# Patient Record
Sex: MALE | Race: WHITE | Employment: FULL TIME | URBAN - METROPOLITAN AREA
[De-identification: names, ages, dates, MRNs, and addresses within clinical notes are randomized per-mention and may not be internally consistent; named-entity substitution may affect disease eponyms.]

---

## 2018-09-19 ENCOUNTER — APPOINTMENT (OUTPATIENT)
Dept: CT IMAGING | Age: 53
End: 2018-09-19
Payer: COMMERCIAL

## 2018-09-19 ENCOUNTER — HOSPITAL ENCOUNTER (EMERGENCY)
Age: 53
Discharge: HOME | End: 2018-09-19
Attending: EMERGENCY MEDICINE
Payer: COMMERCIAL

## 2018-09-19 VITALS
HEIGHT: 73 IN | SYSTOLIC BLOOD PRESSURE: 119 MMHG | HEART RATE: 72 BPM | BODY MASS INDEX: 25.71 KG/M2 | RESPIRATION RATE: 16 BRPM | TEMPERATURE: 98.1 F | OXYGEN SATURATION: 96 % | DIASTOLIC BLOOD PRESSURE: 76 MMHG | WEIGHT: 194 LBS

## 2018-09-19 DIAGNOSIS — Z72.0 TOBACCO USE: ICD-10-CM

## 2018-09-19 DIAGNOSIS — J32.0 CHRONIC MAXILLARY SINUSITIS: ICD-10-CM

## 2018-09-19 DIAGNOSIS — G43.019 INTRACTABLE MIGRAINE WITHOUT AURA AND WITHOUT STATUS MIGRAINOSUS: Primary | ICD-10-CM

## 2018-09-19 DIAGNOSIS — Z86.79 HISTORY OF CAROTID ARTERY DISEASE: ICD-10-CM

## 2018-09-19 LAB
ANION GAP SERPL CALCULATED.3IONS-SCNC: 11 MMOL/L (ref 7–16)
BUN BLDV-MCNC: 11 MG/DL (ref 6–20)
CALCIUM SERPL-MCNC: 9.5 MG/DL (ref 8.6–10.2)
CHLORIDE BLD-SCNC: 103 MMOL/L (ref 98–107)
CO2: 25 MMOL/L (ref 22–29)
CREAT SERPL-MCNC: 0.9 MG/DL (ref 0.7–1.2)
EKG ATRIAL RATE: 76 BPM
EKG P AXIS: 61 DEGREES
EKG P-R INTERVAL: 142 MS
EKG Q-T INTERVAL: 392 MS
EKG QRS DURATION: 92 MS
EKG QTC CALCULATION (BAZETT): 441 MS
EKG R AXIS: 28 DEGREES
EKG T AXIS: 44 DEGREES
EKG VENTRICULAR RATE: 76 BPM
GFR AFRICAN AMERICAN: >60
GFR NON-AFRICAN AMERICAN: >60 ML/MIN/1.73
GLUCOSE BLD-MCNC: 93 MG/DL (ref 74–109)
HCT VFR BLD CALC: 43.6 % (ref 37–54)
HEMOGLOBIN: 15.2 G/DL (ref 12.5–16.5)
MCH RBC QN AUTO: 32.4 PG (ref 26–35)
MCHC RBC AUTO-ENTMCNC: 34.9 % (ref 32–34.5)
MCV RBC AUTO: 93 FL (ref 80–99.9)
PDW BLD-RTO: 13.2 FL (ref 11.5–15)
PLATELET # BLD: 268 E9/L (ref 130–450)
PMV BLD AUTO: 11.6 FL (ref 7–12)
POTASSIUM SERPL-SCNC: 3.9 MMOL/L (ref 3.5–5)
RBC # BLD: 4.69 E12/L (ref 3.8–5.8)
SODIUM BLD-SCNC: 139 MMOL/L (ref 132–146)
TROPONIN: <0.01 NG/ML (ref 0–0.03)
WBC # BLD: 10.3 E9/L (ref 4.5–11.5)

## 2018-09-19 PROCEDURE — 80048 BASIC METABOLIC PNL TOTAL CA: CPT

## 2018-09-19 PROCEDURE — 85027 COMPLETE CBC AUTOMATED: CPT

## 2018-09-19 PROCEDURE — 70450 CT HEAD/BRAIN W/O DYE: CPT

## 2018-09-19 PROCEDURE — 84484 ASSAY OF TROPONIN QUANT: CPT

## 2018-09-19 PROCEDURE — 99284 EMERGENCY DEPT VISIT MOD MDM: CPT

## 2018-09-19 PROCEDURE — 2580000003 HC RX 258: Performed by: EMERGENCY MEDICINE

## 2018-09-19 PROCEDURE — 6360000002 HC RX W HCPCS: Performed by: EMERGENCY MEDICINE

## 2018-09-19 PROCEDURE — 93005 ELECTROCARDIOGRAM TRACING: CPT | Performed by: PHYSICIAN ASSISTANT

## 2018-09-19 PROCEDURE — 96374 THER/PROPH/DIAG INJ IV PUSH: CPT

## 2018-09-19 PROCEDURE — 96375 TX/PRO/DX INJ NEW DRUG ADDON: CPT

## 2018-09-19 RX ORDER — METOCLOPRAMIDE HYDROCHLORIDE 5 MG/ML
10 INJECTION INTRAMUSCULAR; INTRAVENOUS ONCE
Status: COMPLETED | OUTPATIENT
Start: 2018-09-19 | End: 2018-09-19

## 2018-09-19 RX ORDER — AMOXICILLIN AND CLAVULANATE POTASSIUM 875; 125 MG/1; MG/1
1 TABLET, FILM COATED ORAL 2 TIMES DAILY
Qty: 14 TABLET | Refills: 0 | Status: SHIPPED | OUTPATIENT
Start: 2018-09-19 | End: 2018-09-26

## 2018-09-19 RX ORDER — BUTALBITAL, ACETAMINOPHEN AND CAFFEINE 300; 40; 50 MG/1; MG/1; MG/1
1 CAPSULE ORAL EVERY 4 HOURS PRN
Qty: 12 CAPSULE | Refills: 1 | Status: SHIPPED | OUTPATIENT
Start: 2018-09-19 | End: 2019-08-29

## 2018-09-19 RX ORDER — LORATADINE AND PSEUDOEPHEDRINE SULFATE 5; 120 MG/1; MG/1
1 TABLET, EXTENDED RELEASE ORAL 2 TIMES DAILY
Qty: 20 TABLET | Refills: 0 | Status: SHIPPED | OUTPATIENT
Start: 2018-09-19 | End: 2019-08-29

## 2018-09-19 RX ORDER — AMLODIPINE BESYLATE 5 MG/1
5 TABLET ORAL DAILY
COMMUNITY
End: 2019-05-29 | Stop reason: SDUPTHER

## 2018-09-19 RX ORDER — DIPHENHYDRAMINE HYDROCHLORIDE 50 MG/ML
50 INJECTION INTRAMUSCULAR; INTRAVENOUS ONCE
Status: COMPLETED | OUTPATIENT
Start: 2018-09-19 | End: 2018-09-19

## 2018-09-19 RX ORDER — 0.9 % SODIUM CHLORIDE 0.9 %
1000 INTRAVENOUS SOLUTION INTRAVENOUS ONCE
Status: COMPLETED | OUTPATIENT
Start: 2018-09-19 | End: 2018-09-19

## 2018-09-19 RX ADMIN — SODIUM CHLORIDE 1000 ML: 9 INJECTION, SOLUTION INTRAVENOUS at 21:14

## 2018-09-19 RX ADMIN — METOCLOPRAMIDE 10 MG: 5 INJECTION, SOLUTION INTRAMUSCULAR; INTRAVENOUS at 21:15

## 2018-09-19 RX ADMIN — DIPHENHYDRAMINE HYDROCHLORIDE 50 MG: 50 INJECTION INTRAMUSCULAR; INTRAVENOUS at 21:15

## 2018-09-19 ASSESSMENT — PAIN DESCRIPTION - LOCATION: LOCATION: HEAD

## 2018-09-19 ASSESSMENT — PAIN DESCRIPTION - ONSET: ONSET: SUDDEN

## 2018-09-19 ASSESSMENT — PAIN DESCRIPTION - PAIN TYPE: TYPE: ACUTE PAIN

## 2018-09-19 ASSESSMENT — PAIN DESCRIPTION - DESCRIPTORS: DESCRIPTORS: PATIENT UNABLE TO DESCRIBE

## 2018-09-19 ASSESSMENT — PAIN SCALES - GENERAL: PAINLEVEL_OUTOF10: 7

## 2018-09-19 ASSESSMENT — PAIN DESCRIPTION - FREQUENCY: FREQUENCY: INTERMITTENT

## 2018-09-20 NOTE — ED PROVIDER NOTES
migraine without aura and without status migrainosus    2. Chronic maxillary sinusitis    3. Tobacco use    4. History of carotid artery disease        DISPOSITION  Disposition: Discharge to home  Patient condition is stable    9/19/18, 9:02 PM.    This note is prepared by Nathaniel Logan, acting as Scribe for Alysia Callejas MD.    Alysia Callejas MD:  The scribe's documentation has been prepared under my direction and personally reviewed by me in its entirety. I confirm that the note above accurately reflects all work, treatment, procedures, and medical decision making performed by me.              Alysia Callejas MD  09/19/18 4031

## 2018-09-20 NOTE — ED NOTES
Pt in Room 13 w/ significant other; c/o HA and blurred vision in his R eye; states that the HA began at the beginning of the summer and the blurred vision has been for the past week; pt also c/o R FA numbness for several years, CP for the past week, and lightheadedness for the past 2 months     Hillary Lay RN  09/19/18 2013

## 2019-04-15 LAB
CHOLESTEROL, TOTAL: 222 MG/DL
CHOLESTEROL/HDL RATIO: 5
CREATININE: 0.9 MG/DL
HDLC SERPL-MCNC: 44 MG/DL (ref 35–70)
LDL CHOLESTEROL CALCULATED: 153 MG/DL (ref 0–160)
POTASSIUM (K+): 4.4
TRIGL SERPL-MCNC: 123 MG/DL
VLDLC SERPL CALC-MCNC: NORMAL MG/DL

## 2019-04-23 VITALS
HEIGHT: 73 IN | SYSTOLIC BLOOD PRESSURE: 140 MMHG | OXYGEN SATURATION: 97 % | BODY MASS INDEX: 30.62 KG/M2 | WEIGHT: 231 LBS | DIASTOLIC BLOOD PRESSURE: 90 MMHG | TEMPERATURE: 97.9 F | HEART RATE: 85 BPM

## 2019-04-23 RX ORDER — ATORVASTATIN CALCIUM 40 MG/1
40 TABLET, FILM COATED ORAL DAILY
COMMUNITY
End: 2019-05-29 | Stop reason: SDUPTHER

## 2019-04-23 RX ORDER — LISINOPRIL AND HYDROCHLOROTHIAZIDE 20; 12.5 MG/1; MG/1
1 TABLET ORAL DAILY
COMMUNITY
End: 2019-05-29 | Stop reason: SDUPTHER

## 2019-04-23 RX ORDER — OMEPRAZOLE 20 MG/1
20 CAPSULE, DELAYED RELEASE ORAL DAILY
COMMUNITY
End: 2019-05-29 | Stop reason: SDUPTHER

## 2019-04-23 RX ORDER — MIRTAZAPINE 30 MG/1
45 TABLET, FILM COATED ORAL NIGHTLY
COMMUNITY
End: 2019-05-29 | Stop reason: SDUPTHER

## 2019-05-29 ENCOUNTER — HOSPITAL ENCOUNTER (OUTPATIENT)
Age: 54
Discharge: HOME | End: 2019-05-31
Payer: COMMERCIAL

## 2019-05-29 ENCOUNTER — OFFICE VISIT (OUTPATIENT)
Dept: FAMILY MEDICINE CLINIC | Age: 54
End: 2019-05-29
Payer: COMMERCIAL

## 2019-05-29 VITALS
OXYGEN SATURATION: 92 % | HEART RATE: 81 BPM | WEIGHT: 232 LBS | BODY MASS INDEX: 30.61 KG/M2 | RESPIRATION RATE: 18 BRPM | DIASTOLIC BLOOD PRESSURE: 78 MMHG | SYSTOLIC BLOOD PRESSURE: 118 MMHG

## 2019-05-29 DIAGNOSIS — I10 ESSENTIAL HYPERTENSION: ICD-10-CM

## 2019-05-29 DIAGNOSIS — J41.0 SIMPLE CHRONIC BRONCHITIS (HCC): ICD-10-CM

## 2019-05-29 DIAGNOSIS — E78.49 OTHER HYPERLIPIDEMIA: ICD-10-CM

## 2019-05-29 DIAGNOSIS — K21.9 GASTROESOPHAGEAL REFLUX DISEASE WITHOUT ESOPHAGITIS: ICD-10-CM

## 2019-05-29 DIAGNOSIS — I65.23 BILATERAL CAROTID ARTERY STENOSIS: ICD-10-CM

## 2019-05-29 DIAGNOSIS — F32.A DEPRESSION, UNSPECIFIED DEPRESSION TYPE: Primary | ICD-10-CM

## 2019-05-29 LAB
ANION GAP SERPL CALCULATED.3IONS-SCNC: 15 MMOL/L (ref 7–16)
BUN BLDV-MCNC: 10 MG/DL (ref 6–20)
CALCIUM SERPL-MCNC: 10.1 MG/DL (ref 8.6–10.2)
CHLORIDE BLD-SCNC: 106 MMOL/L (ref 98–107)
CHOLESTEROL, TOTAL: 126 MG/DL (ref 0–199)
CO2: 22 MMOL/L (ref 22–29)
CREAT SERPL-MCNC: 0.9 MG/DL (ref 0.7–1.2)
GFR AFRICAN AMERICAN: >60
GFR NON-AFRICAN AMERICAN: >60 ML/MIN/1.73
GLUCOSE BLD-MCNC: 113 MG/DL (ref 74–99)
HDLC SERPL-MCNC: 45 MG/DL
LDL CHOLESTEROL CALCULATED: 66 MG/DL (ref 0–99)
POTASSIUM SERPL-SCNC: 4.7 MMOL/L (ref 3.5–5)
SODIUM BLD-SCNC: 143 MMOL/L (ref 132–146)
TRIGL SERPL-MCNC: 73 MG/DL (ref 0–149)
VLDLC SERPL CALC-MCNC: 15 MG/DL

## 2019-05-29 PROCEDURE — 80061 LIPID PANEL: CPT

## 2019-05-29 PROCEDURE — 99214 OFFICE O/P EST MOD 30 MIN: CPT | Performed by: INTERNAL MEDICINE

## 2019-05-29 PROCEDURE — 80048 BASIC METABOLIC PNL TOTAL CA: CPT

## 2019-05-29 PROCEDURE — 36415 COLL VENOUS BLD VENIPUNCTURE: CPT

## 2019-05-29 RX ORDER — MIRTAZAPINE 30 MG/1
45 TABLET, FILM COATED ORAL NIGHTLY
Qty: 45 TABLET | Refills: 5 | Status: SHIPPED | OUTPATIENT
Start: 2019-05-29 | End: 2019-08-29 | Stop reason: SINTOL

## 2019-05-29 RX ORDER — LISINOPRIL AND HYDROCHLOROTHIAZIDE 20; 12.5 MG/1; MG/1
1 TABLET ORAL DAILY
Qty: 30 TABLET | Refills: 5 | Status: SHIPPED | OUTPATIENT
Start: 2019-05-29 | End: 2019-08-29 | Stop reason: SDUPTHER

## 2019-05-29 RX ORDER — OMEPRAZOLE 20 MG/1
20 CAPSULE, DELAYED RELEASE ORAL DAILY
Qty: 30 CAPSULE | Refills: 5 | Status: SHIPPED | OUTPATIENT
Start: 2019-05-29 | End: 2019-10-09

## 2019-05-29 RX ORDER — LISINOPRIL 20 MG/1
TABLET ORAL
Refills: 1 | COMMUNITY
Start: 2019-03-25 | End: 2019-05-29 | Stop reason: ALTCHOICE

## 2019-05-29 RX ORDER — AMLODIPINE BESYLATE 5 MG/1
5 TABLET ORAL DAILY
Qty: 30 TABLET | Refills: 5 | Status: SHIPPED | OUTPATIENT
Start: 2019-05-29 | End: 2019-08-29

## 2019-05-29 RX ORDER — ATORVASTATIN CALCIUM 40 MG/1
40 TABLET, FILM COATED ORAL DAILY
Qty: 30 TABLET | Refills: 5 | Status: SHIPPED | OUTPATIENT
Start: 2019-05-29 | End: 2019-08-29 | Stop reason: SDUPTHER

## 2019-05-29 ASSESSMENT — PATIENT HEALTH QUESTIONNAIRE - PHQ9
SUM OF ALL RESPONSES TO PHQ QUESTIONS 1-9: 0
1. LITTLE INTEREST OR PLEASURE IN DOING THINGS: 0
SUM OF ALL RESPONSES TO PHQ QUESTIONS 1-9: 0
SUM OF ALL RESPONSES TO PHQ9 QUESTIONS 1 & 2: 0
2. FEELING DOWN, DEPRESSED OR HOPELESS: 0

## 2019-05-29 ASSESSMENT — ENCOUNTER SYMPTOMS
COUGH: 0
ABDOMINAL PAIN: 0
SINUS PAIN: 0
SHORTNESS OF BREATH: 0
WHEEZING: 0
CONSTIPATION: 0
BLOOD IN STOOL: 0
VOMITING: 0
NAUSEA: 0
DIARRHEA: 0
RHINORRHEA: 0
BACK PAIN: 0

## 2019-05-30 ENCOUNTER — TELEPHONE (OUTPATIENT)
Dept: FAMILY MEDICINE CLINIC | Age: 54
End: 2019-05-30

## 2019-05-30 NOTE — TELEPHONE ENCOUNTER
----- Message from Lucio Galindo MD sent at 5/30/2019 12:07 AM EDT -----  Lipids excellent on statin.  sofia henriquez marginal. Will follow

## 2019-05-30 NOTE — PROGRESS NOTES
[facility]     19  Darlynta Prophet : 1965 Sex: male  Age: 53 y.o. Chief Complaint   Patient presents with    Hypertension    Migraine   Multiple medical problem follow-up    HPI: Patient presents today for follow-up visit on multiple medical problems. I reviewed last note. We did change him from lisinopril to lisinopril/hydrochlorothiazide. This did make a difference in his blood pressure. It is better controlled from the numbers he is showing me. We also increased his mirtazapine from 30-45 mg daily he states he has noticed a difference in his mood as well. He is up-to-date with vascular and has had a carotid ultrasound in the interim which looked okay. We also started atorvastatin since last visit. Going to repeat numbers today. He has tolerated the medicine well. Has cut back on his alcohol consumption. Unfortunately went back to smoking again. Review of Systems   Constitutional: Negative for activity change, appetite change, chills, diaphoresis, fatigue, fever and unexpected weight change. HENT: Negative for congestion, ear pain, hearing loss, postnasal drip, rhinorrhea and sinus pain. Respiratory: Negative for cough, shortness of breath and wheezing. Cardiovascular: Negative for chest pain, palpitations and leg swelling. Gastrointestinal: Negative for abdominal pain, blood in stool, constipation, diarrhea, nausea and vomiting. GERD improved   Endocrine: Negative. Genitourinary: Negative for difficulty urinating, dysuria, frequency, hematuria and urgency. Musculoskeletal: Positive for arthralgias. Negative for back pain, gait problem and myalgias. Skin: Negative. Allergic/Immunologic: Negative for environmental allergies and immunocompromised state. Neurological: Positive for dizziness and headaches. Negative for weakness, light-headedness and numbness. Dizziness and headache resolved   Hematological: Negative.     Psychiatric/Behavioral: Positive MG per tablet; Take 1 tablet by mouth daily  -     BASIC METABOLIC PANEL; Future    Bilateral carotid artery stenosis    Simple chronic bronchitis (HCC)    Gastroesophageal reflux disease without esophagitis  -     omeprazole (PRILOSEC) 20 MG delayed release capsule; Take 1 capsule by mouth daily    Plan: See him back in 3 months and when necessary. Continue same medication. Prescription management performed. We'll check lipids and BMP today to monitor disease progression and medication use. Continue vascular follow-up. Smoking cessation counseling given again. Alcohol counseling given again. Notify us with problems in the interim. Return in about 3 months (around 8/29/2019). Seen By:  Louis Slater MD      *Document was created using voice recognition software. Note was reviewed however may contain grammatical errors.

## 2019-08-29 ENCOUNTER — OFFICE VISIT (OUTPATIENT)
Dept: FAMILY MEDICINE CLINIC | Age: 54
End: 2019-08-29
Payer: COMMERCIAL

## 2019-08-29 VITALS
HEART RATE: 76 BPM | OXYGEN SATURATION: 98 % | DIASTOLIC BLOOD PRESSURE: 78 MMHG | BODY MASS INDEX: 28.36 KG/M2 | SYSTOLIC BLOOD PRESSURE: 134 MMHG | WEIGHT: 214 LBS | HEIGHT: 73 IN

## 2019-08-29 DIAGNOSIS — E78.49 OTHER HYPERLIPIDEMIA: ICD-10-CM

## 2019-08-29 DIAGNOSIS — F32.A DEPRESSION, UNSPECIFIED DEPRESSION TYPE: ICD-10-CM

## 2019-08-29 DIAGNOSIS — I65.23 BILATERAL CAROTID ARTERY STENOSIS: Primary | ICD-10-CM

## 2019-08-29 DIAGNOSIS — E53.8 VITAMIN B 12 DEFICIENCY: ICD-10-CM

## 2019-08-29 DIAGNOSIS — I10 ESSENTIAL HYPERTENSION: ICD-10-CM

## 2019-08-29 DIAGNOSIS — K21.9 GASTROESOPHAGEAL REFLUX DISEASE WITHOUT ESOPHAGITIS: ICD-10-CM

## 2019-08-29 DIAGNOSIS — F41.9 ANXIETY: ICD-10-CM

## 2019-08-29 PROCEDURE — 99214 OFFICE O/P EST MOD 30 MIN: CPT | Performed by: INTERNAL MEDICINE

## 2019-08-29 RX ORDER — LISINOPRIL AND HYDROCHLOROTHIAZIDE 20; 12.5 MG/1; MG/1
1 TABLET ORAL DAILY
Qty: 30 TABLET | Refills: 5 | Status: SHIPPED | OUTPATIENT
Start: 2019-08-29 | End: 2019-10-09 | Stop reason: SDUPTHER

## 2019-08-29 RX ORDER — ATORVASTATIN CALCIUM 40 MG/1
40 TABLET, FILM COATED ORAL DAILY
Qty: 30 TABLET | Refills: 5 | Status: SHIPPED | OUTPATIENT
Start: 2019-08-29 | End: 2019-10-09 | Stop reason: SDUPTHER

## 2019-09-01 NOTE — PROGRESS NOTES
[address] [facility] [address] [facility]     19  Anita Gurrola : 1965 Sex: male  Age: 53 y.o. Chief Complaint   Patient presents with    Depression    Hypertension       HPI  Patient presents today for 3-month follow-up visit on multiple medical problems. Pressure she is been checking at home have been good. Compliant with his medication. He did end up stopping mirtazapine felt it made him feel \"funny\". States he thought it worked initially but later started causing too many problems. He does admit to being very anxious to the point of gagging in the mornings. Somewhat depressed. Denies any suicidal ideation. Is having a lot of family issues apparently also. He does continue to smoke despite numerous recommendations. Given his vascular disease I told him it is imperative he does so. He is up-to-date with vascular. Review of Systems   Constitutional: Negative for activity change, appetite change, chills, diaphoresis, fatigue, fever and unexpected weight change. HENT: Negative for congestion, ear pain, hearing loss, postnasal drip, rhinorrhea and sinus pain. Respiratory: Negative for cough, shortness of breath and wheezing. Cardiovascular: Negative for chest pain, palpitations and leg swelling. Gastrointestinal: Negative for abdominal pain, blood in stool, constipation, diarrhea, nausea and vomiting. GERD improved   Endocrine: Negative. Genitourinary: Negative for difficulty urinating, dysuria, frequency, hematuria and urgency. Musculoskeletal: Positive for arthralgias. Negative for back pain, gait problem and myalgias. Skin: Negative. Allergic/Immunologic: Negative for environmental allergies and immunocompromised state. Neurological: Positive for dizziness and headaches. Negative for weakness, light-headedness and numbness. Dizziness and headache resolved   Hematological: Negative. Psychiatric/Behavioral: Positive for sleep disturbance.  The patient is hepatosplenomegaly. Musculo: Walks with a normal gait. Upper Extremities: Full ROM bilaterally. Lower Extremities:  Full ROM bilaterally. Skin: Dry and warm with no rash. Neuro: Alert and oriented x3. Esequiel Fournier Speech is articulate and fluent. Upper Extremities: motor strength is intact. Normal muscle tone bilaterally. Lower Extremities: motor  strength is intact. Normal muscle tone bilaterally. Sensation intact to light touch. Reflexes: DTR's are  symmetric, intact and 2+ bilaterally. Cranial Nerves: Cranial nerves II-XII intact. Psych: Patient's attitude is cooperative. Patient's affect is anxious/depressed. Judgement is realistic. Insight is  Appropriate. Assessment and Plan:  Reuben Yadav was seen today for depression and hypertension. Diagnoses and all orders for this visit:    Bilateral carotid artery stenosis    Essential hypertension  -     lisinopril-hydrochlorothiazide (PRINZIDE;ZESTORETIC) 20-12.5 MG per tablet; Take 1 tablet by mouth daily  -     CBC Auto Differential; Future  -     Comprehensive Metabolic Panel; Future    Other hyperlipidemia  -     atorvastatin (LIPITOR) 40 MG tablet; Take 1 tablet by mouth daily    Depression, unspecified depression type  -     TSH without Reflex; Future  -     sertraline (ZOLOFT) 50 MG tablet; 1/2 pill po daily x 1 week then 1 pill daily thereafter. Anxiety  -     TSH without Reflex; Future  -     sertraline (ZOLOFT) 50 MG tablet; 1/2 pill po daily x 1 week then 1 pill daily thereafter. Gastroesophageal reflux disease without esophagitis    Vitamin B 12 deficiency  -     VITAMIN B12 & FOLATE; Future    Plan: Blood work today to monitor disease progression and medication use. Prescription management performed. Started Zoloft. Warned of potential side effects. Continue to monitor blood pressure closely. Smoking cessation counseling given again. Follow-up with vascular per the request.  We will see back in 6 weeks to reassess on the new medication.   Notify us with problems in the interim. Return in about 6 weeks (around 10/10/2019). Seen By:  France Peters MD      *Document was created using voice recognition software. Note was reviewed however may contain grammatical errors.

## 2019-09-04 ENCOUNTER — HOSPITAL ENCOUNTER (OUTPATIENT)
Age: 54
Discharge: HOME | End: 2019-09-06
Payer: COMMERCIAL

## 2019-09-04 DIAGNOSIS — I10 ESSENTIAL HYPERTENSION: ICD-10-CM

## 2019-09-04 DIAGNOSIS — E53.8 VITAMIN B 12 DEFICIENCY: ICD-10-CM

## 2019-09-04 DIAGNOSIS — F32.A DEPRESSION, UNSPECIFIED DEPRESSION TYPE: ICD-10-CM

## 2019-09-04 DIAGNOSIS — F41.9 ANXIETY: ICD-10-CM

## 2019-09-04 LAB
ALBUMIN SERPL-MCNC: 4.8 G/DL (ref 3.5–5.2)
ALP BLD-CCNC: 74 U/L (ref 40–129)
ALT SERPL-CCNC: 23 U/L (ref 0–40)
ANION GAP SERPL CALCULATED.3IONS-SCNC: 19 MMOL/L (ref 7–16)
AST SERPL-CCNC: 22 U/L (ref 0–39)
BASOPHILS ABSOLUTE: 0.06 E9/L (ref 0–0.2)
BASOPHILS RELATIVE PERCENT: 0.7 % (ref 0–2)
BILIRUB SERPL-MCNC: 0.4 MG/DL (ref 0–1.2)
BUN BLDV-MCNC: 9 MG/DL (ref 6–20)
CALCIUM SERPL-MCNC: 9.8 MG/DL (ref 8.6–10.2)
CHLORIDE BLD-SCNC: 103 MMOL/L (ref 98–107)
CO2: 19 MMOL/L (ref 22–29)
CREAT SERPL-MCNC: 0.8 MG/DL (ref 0.7–1.2)
EOSINOPHILS ABSOLUTE: 0.09 E9/L (ref 0.05–0.5)
EOSINOPHILS RELATIVE PERCENT: 1 % (ref 0–6)
FOLATE: 10 NG/ML (ref 4.8–24.2)
GFR AFRICAN AMERICAN: >60
GFR NON-AFRICAN AMERICAN: >60 ML/MIN/1.73
GLUCOSE BLD-MCNC: 119 MG/DL (ref 74–99)
HCT VFR BLD CALC: 43 % (ref 37–54)
HEMOGLOBIN: 14.3 G/DL (ref 12.5–16.5)
IMMATURE GRANULOCYTES #: 0.02 E9/L
IMMATURE GRANULOCYTES %: 0.2 % (ref 0–5)
LYMPHOCYTES ABSOLUTE: 2.17 E9/L (ref 1.5–4)
LYMPHOCYTES RELATIVE PERCENT: 24.4 % (ref 20–42)
MCH RBC QN AUTO: 31 PG (ref 26–35)
MCHC RBC AUTO-ENTMCNC: 33.3 % (ref 32–34.5)
MCV RBC AUTO: 93.1 FL (ref 80–99.9)
MONOCYTES ABSOLUTE: 0.69 E9/L (ref 0.1–0.95)
MONOCYTES RELATIVE PERCENT: 7.8 % (ref 2–12)
NEUTROPHILS ABSOLUTE: 5.86 E9/L (ref 1.8–7.3)
NEUTROPHILS RELATIVE PERCENT: 65.9 % (ref 43–80)
PDW BLD-RTO: 13.2 FL (ref 11.5–15)
PLATELET # BLD: 250 E9/L (ref 130–450)
PMV BLD AUTO: 13.1 FL (ref 7–12)
POTASSIUM SERPL-SCNC: 4.6 MMOL/L (ref 3.5–5)
RBC # BLD: 4.62 E12/L (ref 3.8–5.8)
SODIUM BLD-SCNC: 141 MMOL/L (ref 132–146)
TOTAL PROTEIN: 7.5 G/DL (ref 6.4–8.3)
TSH SERPL DL<=0.05 MIU/L-ACNC: 1.08 UIU/ML (ref 0.27–4.2)
VITAMIN B-12: 357 PG/ML (ref 211–946)
WBC # BLD: 8.9 E9/L (ref 4.5–11.5)

## 2019-09-04 PROCEDURE — 82607 VITAMIN B-12: CPT

## 2019-09-04 PROCEDURE — 85025 COMPLETE CBC W/AUTO DIFF WBC: CPT

## 2019-09-04 PROCEDURE — 80053 COMPREHEN METABOLIC PANEL: CPT

## 2019-09-04 PROCEDURE — 36415 COLL VENOUS BLD VENIPUNCTURE: CPT

## 2019-09-04 PROCEDURE — 82746 ASSAY OF FOLIC ACID SERUM: CPT

## 2019-09-04 PROCEDURE — 84443 ASSAY THYROID STIM HORMONE: CPT

## 2019-09-05 ENCOUNTER — TELEPHONE (OUTPATIENT)
Dept: FAMILY MEDICINE CLINIC | Age: 54
End: 2019-09-05

## 2019-10-09 ENCOUNTER — OFFICE VISIT (OUTPATIENT)
Dept: FAMILY MEDICINE CLINIC | Age: 54
End: 2019-10-09
Payer: COMMERCIAL

## 2019-10-09 VITALS
HEART RATE: 74 BPM | BODY MASS INDEX: 28.1 KG/M2 | OXYGEN SATURATION: 95 % | WEIGHT: 213 LBS | SYSTOLIC BLOOD PRESSURE: 122 MMHG | DIASTOLIC BLOOD PRESSURE: 74 MMHG

## 2019-10-09 DIAGNOSIS — I65.29 STENOSIS OF CAROTID ARTERY, UNSPECIFIED LATERALITY: ICD-10-CM

## 2019-10-09 DIAGNOSIS — F41.9 ANXIETY: ICD-10-CM

## 2019-10-09 DIAGNOSIS — R73.9 HYPERGLYCEMIA: ICD-10-CM

## 2019-10-09 DIAGNOSIS — K21.9 GASTROESOPHAGEAL REFLUX DISEASE WITHOUT ESOPHAGITIS: ICD-10-CM

## 2019-10-09 DIAGNOSIS — I10 ESSENTIAL HYPERTENSION: Primary | ICD-10-CM

## 2019-10-09 DIAGNOSIS — F32.A DEPRESSION, UNSPECIFIED DEPRESSION TYPE: ICD-10-CM

## 2019-10-09 DIAGNOSIS — E78.49 OTHER HYPERLIPIDEMIA: ICD-10-CM

## 2019-10-09 DIAGNOSIS — Z12.5 SCREENING FOR MALIGNANT NEOPLASM OF PROSTATE: ICD-10-CM

## 2019-10-09 PROCEDURE — 99214 OFFICE O/P EST MOD 30 MIN: CPT | Performed by: INTERNAL MEDICINE

## 2019-10-09 RX ORDER — LISINOPRIL AND HYDROCHLOROTHIAZIDE 20; 12.5 MG/1; MG/1
1 TABLET ORAL DAILY
Qty: 90 TABLET | Refills: 1 | Status: SHIPPED
Start: 2019-10-09 | End: 2020-05-21 | Stop reason: ALTCHOICE

## 2019-10-09 RX ORDER — ATORVASTATIN CALCIUM 40 MG/1
40 TABLET, FILM COATED ORAL DAILY
Qty: 90 TABLET | Refills: 1 | Status: SHIPPED | OUTPATIENT
Start: 2019-10-09 | End: 2020-01-15

## 2019-10-14 ENCOUNTER — TELEPHONE (OUTPATIENT)
Dept: FAMILY MEDICINE CLINIC | Age: 54
End: 2019-10-14

## 2019-10-14 ENCOUNTER — HOSPITAL ENCOUNTER (OUTPATIENT)
Age: 54
Discharge: HOME | End: 2019-10-16
Payer: COMMERCIAL

## 2019-10-14 DIAGNOSIS — I10 ESSENTIAL HYPERTENSION: ICD-10-CM

## 2019-10-14 DIAGNOSIS — E78.49 OTHER HYPERLIPIDEMIA: ICD-10-CM

## 2019-10-14 DIAGNOSIS — R73.9 HYPERGLYCEMIA: ICD-10-CM

## 2019-10-14 DIAGNOSIS — Z12.5 SCREENING FOR MALIGNANT NEOPLASM OF PROSTATE: ICD-10-CM

## 2019-10-14 LAB
ANION GAP SERPL CALCULATED.3IONS-SCNC: 15 MMOL/L (ref 7–16)
BUN BLDV-MCNC: 13 MG/DL (ref 6–20)
CALCIUM SERPL-MCNC: 9.5 MG/DL (ref 8.6–10.2)
CHLORIDE BLD-SCNC: 105 MMOL/L (ref 98–107)
CHOLESTEROL, TOTAL: 141 MG/DL (ref 0–199)
CO2: 20 MMOL/L (ref 22–29)
CREAT SERPL-MCNC: 0.9 MG/DL (ref 0.7–1.2)
GFR AFRICAN AMERICAN: >60
GFR NON-AFRICAN AMERICAN: >60 ML/MIN/1.73
GLUCOSE BLD-MCNC: 116 MG/DL (ref 74–99)
HBA1C MFR BLD: 6.1 % (ref 4–5.6)
HDLC SERPL-MCNC: 51 MG/DL
LDL CHOLESTEROL CALCULATED: 75 MG/DL (ref 0–99)
POTASSIUM SERPL-SCNC: 4.3 MMOL/L (ref 3.5–5)
PROSTATE SPECIFIC ANTIGEN: 0.85 NG/ML (ref 0–4)
SODIUM BLD-SCNC: 140 MMOL/L (ref 132–146)
TRIGL SERPL-MCNC: 74 MG/DL (ref 0–149)
VLDLC SERPL CALC-MCNC: 15 MG/DL

## 2019-10-14 PROCEDURE — 80048 BASIC METABOLIC PNL TOTAL CA: CPT

## 2019-10-14 PROCEDURE — 83036 HEMOGLOBIN GLYCOSYLATED A1C: CPT

## 2019-10-14 PROCEDURE — 80061 LIPID PANEL: CPT

## 2019-10-14 PROCEDURE — 36415 COLL VENOUS BLD VENIPUNCTURE: CPT

## 2019-10-14 PROCEDURE — G0103 PSA SCREENING: HCPCS

## 2020-01-15 ENCOUNTER — OFFICE VISIT (OUTPATIENT)
Dept: FAMILY MEDICINE CLINIC | Age: 55
End: 2020-01-15
Payer: COMMERCIAL

## 2020-01-15 ENCOUNTER — TELEPHONE (OUTPATIENT)
Dept: FAMILY MEDICINE CLINIC | Age: 55
End: 2020-01-15

## 2020-01-15 ENCOUNTER — HOSPITAL ENCOUNTER (OUTPATIENT)
Age: 55
Discharge: HOME | End: 2020-01-17
Payer: COMMERCIAL

## 2020-01-15 VITALS
OXYGEN SATURATION: 97 % | BODY MASS INDEX: 27.17 KG/M2 | TEMPERATURE: 97.7 F | DIASTOLIC BLOOD PRESSURE: 78 MMHG | WEIGHT: 205 LBS | HEART RATE: 77 BPM | HEIGHT: 73 IN | SYSTOLIC BLOOD PRESSURE: 118 MMHG | RESPIRATION RATE: 18 BRPM

## 2020-01-15 LAB
CHOLESTEROL, TOTAL: 199 MG/DL (ref 0–199)
HBA1C MFR BLD: 5.5 %
HDLC SERPL-MCNC: 49 MG/DL
LDL CHOLESTEROL CALCULATED: 132 MG/DL (ref 0–99)
TRIGL SERPL-MCNC: 90 MG/DL (ref 0–149)
VLDLC SERPL CALC-MCNC: 18 MG/DL

## 2020-01-15 PROCEDURE — 36415 COLL VENOUS BLD VENIPUNCTURE: CPT

## 2020-01-15 PROCEDURE — 80061 LIPID PANEL: CPT

## 2020-01-15 PROCEDURE — 99214 OFFICE O/P EST MOD 30 MIN: CPT | Performed by: INTERNAL MEDICINE

## 2020-01-15 PROCEDURE — 83036 HEMOGLOBIN GLYCOSYLATED A1C: CPT | Performed by: INTERNAL MEDICINE

## 2020-01-15 RX ORDER — DOXYCYCLINE HYCLATE 100 MG
100 TABLET ORAL 2 TIMES DAILY
Qty: 20 TABLET | Refills: 0 | Status: SHIPPED | OUTPATIENT
Start: 2020-01-15 | End: 2020-01-25

## 2020-01-15 RX ORDER — OMEPRAZOLE 20 MG/1
CAPSULE, DELAYED RELEASE ORAL
COMMUNITY
Start: 2019-10-22 | End: 2020-05-21 | Stop reason: ALTCHOICE

## 2020-01-15 ASSESSMENT — PATIENT HEALTH QUESTIONNAIRE - PHQ9
1. LITTLE INTEREST OR PLEASURE IN DOING THINGS: 0
2. FEELING DOWN, DEPRESSED OR HOPELESS: 0
SUM OF ALL RESPONSES TO PHQ QUESTIONS 1-9: 0
SUM OF ALL RESPONSES TO PHQ9 QUESTIONS 1 & 2: 0
SUM OF ALL RESPONSES TO PHQ QUESTIONS 1-9: 0

## 2020-01-16 RX ORDER — ROSUVASTATIN CALCIUM 5 MG/1
5 TABLET, COATED ORAL DAILY
Qty: 90 TABLET | Refills: 0 | Status: SHIPPED
Start: 2020-01-16 | End: 2020-05-21 | Stop reason: ALTCHOICE

## 2020-01-16 NOTE — TELEPHONE ENCOUNTER
Patient's LDL is too high for his underlying vascular disease. I discussed with him about trying another statin medication since he could not tolerate atorvastatin. See if he is willing to try another.   If so Que rosuvastatin 5 mg daily    Sugar numbers were okay    Chest x-ray normal

## 2020-01-16 NOTE — TELEPHONE ENCOUNTER
Patient is willing to try Rosuvastatin and would like it sent to [facility] in [place]. Patient also asked for his EKG results. Patient stated he had it done at [facility] yesterday. I called them and requested the results for you to review. I will place in your mailbox once received.   Thanks

## 2020-01-18 NOTE — PROGRESS NOTES
seen today for 3 month follow-up. Diagnoses and all orders for this visit:    Acute sinusitis, recurrence not specified, unspecified location  -     doxycycline hyclate (VIBRA-TABS) 100 MG tablet; Take 1 tablet by mouth 2 times daily for 10 days    Essential hypertension    Other hyperlipidemia  -     Lipid Panel; Future    Stenosis of carotid artery, unspecified laterality    Anxiety    Gastroesophageal reflux disease without esophagitis    Hyperglycemia  -     Cancel: Hemoglobin A1C; Future  -     POCT glycosylated hemoglobin (Hb A1C)    Chest pain, unspecified type  -     XR CHEST STANDARD (2 VW); Future  -     EKG 12 Lead; Future    Plan: See above body report. Will need to get back on statin medication in the near future. Declining any antidepression/antianxiety medications currently. Patient does not appear to be suicidal.  Axis cycling for sinusitis. Warned of potential side effects. Probiotic. Push fluids. Follow-up with vascular. Attempt to get their last notes. Viewed last notes. X-ray today which I visualized independently finding no acute process. ECG will be done at the hospital as our machine is not currently functioning. Notify me if this chest discomfort continues and will have cardiology see him. Monitor blood pressure and write numbers down. Bring his machine in next visit. Notify us with problems in the interim. Return in about 1 month (around 2/15/2020). Seen By:  Dolores Villafana MD      *Document was created using voice recognition software. Note was reviewed however may contain grammatical errors.

## 2020-02-26 ENCOUNTER — OFFICE VISIT (OUTPATIENT)
Dept: FAMILY MEDICINE CLINIC | Age: 55
End: 2020-02-26
Payer: COMMERCIAL

## 2020-02-26 VITALS
WEIGHT: 205 LBS | SYSTOLIC BLOOD PRESSURE: 132 MMHG | HEIGHT: 73 IN | BODY MASS INDEX: 27.17 KG/M2 | HEART RATE: 75 BPM | OXYGEN SATURATION: 95 % | DIASTOLIC BLOOD PRESSURE: 80 MMHG

## 2020-02-26 PROCEDURE — 99214 OFFICE O/P EST MOD 30 MIN: CPT | Performed by: INTERNAL MEDICINE

## 2020-02-26 SDOH — HEALTH STABILITY: MENTAL HEALTH: HOW MANY STANDARD DRINKS CONTAINING ALCOHOL DO YOU HAVE ON A TYPICAL DAY?: 3 OR 4

## 2020-02-26 SDOH — HEALTH STABILITY: MENTAL HEALTH: HOW OFTEN DO YOU HAVE A DRINK CONTAINING ALCOHOL?: 2-3 TIMES A WEEK

## 2020-02-26 NOTE — PROGRESS NOTES
[address] [facility] [address]     20  Sho Ripa : 1965 Sex: male  Age: 63 y.o. No chief complaint on file. HPI           Review of Systems     Constitutional: Negative for activity change, appetite change, chills, diaphoresis, fatigue, fever and unexpected weight change. HENT: Negative for ear pain, hearing loss,  positive for postnasal drip, rhinorrhea and sinus pain.    Respiratory: Negative for  shortness of breath and wheezing.    Positive for cough  Cardiovascular: Negative for, palpitations and leg swelling. Positive for chest pain  Gastrointestinal: Negative for abdominal pain, blood in stool, constipation, diarrhea, nausea and vomiting.        GERD improved   Endocrine: Negative.    Genitourinary: Negative for difficulty urinating, dysuria, frequency, hematuria and urgency. Musculoskeletal: Positive for arthralgias. Negative for back pain, gait problem and myalgias. Skin: Negative.    Allergic/Immunologic: Negative for environmental allergies and immunocompromised state. Neurological: Positive for dizziness and headaches. Negative for weakness, light-headedness and numbness.        Dizziness and headache resolved   Hematological: Negative.    Psychiatric/Behavioral: Positive for sleep disturbance. The patient is nervous/anxious.   Significant stress. See above.   Patient stopped his mirtazapine and is no longer on that.         REST OF PERTINENT ROS GONE OVER AND WAS NEGATIVE. PMH:  Shot Record:  -TUAZVZM Per 10 MG-NDC#[id number] Injection 08  -Wkjj Medrol up to 40mg Injection 06/11/15 12/05/14  -Lincomycin Hci To 300 MG (Lincocin) Injection 08  -Ajkh Medrol 40 MG Injection 10/28/17 06/22/09.   Health Maintenance:  Colonoscopy Screening - (2016)  diverticulosis  EKG - (3/2007) 3/07,,, 10/14, ,   Stress Test - (2009) -negative,-neg  EGD - () GERD  esophageal erosions  Influenza Vaccination - (10/18/2018) Patient was Counseled, but refused. Heart catheterization - -mild CAD  Colonoscopy - -due 25  Carotid Artery Study -   Medical Problems:  Hypertension - renal artery stenosis on the right-stent  Chronic Obstructive Pulmonary Disease (COPD), Gastroesophageal Reflux Disease (GERD)  esophageal erosions - EGD   Hyperlipidemia, Vitamin B12 and folate deficiency, Attention Deficit Hyperactivity Disorder  CAD - Mild per  heart cath  HSV, Fatty Liver, Left fibula fracture, Prostatitis, Erectile Dysfunction, carotid vascular disease-sees vascular  Surgical Hx:  Left Knee Arthroscopy - Dr Jo Ann Sauer  Cardiac Cath with a stent in renal artery - ()  carotid endarterectomy-right - (10/2018)  Reviewed, no changes. FH:  Father:  Alcoholism -  age 79. Mother:  Cerebrovascular Accident (CVA), Non Insulin Dependent Diabetes,  age 76. Reviewed, no changes. SH: Patient is . -[employer] work  Personal Habits: Current cigarette smoker, has smoked 1 pack daily; quit 2015, restarted shortly  after this.   Drinks 2 glasses of wine a day or more, advised to quit  when GERD got worse in 10/2018           Current Outpatient Medications:     rosuvastatin (CRESTOR) 5 MG tablet, Take 1 tablet by mouth daily, Disp: 90 tablet, Rfl: 0    omeprazole (PRILOSEC) 20 MG delayed release capsule, TAKE 1 CAPSULE BY MOUTH ONCE DAILY, Disp: , Rfl:     lisinopril-hydrochlorothiazide (PRINZIDE;ZESTORETIC) 20-12.5 MG per tablet, Take 1 tablet by mouth daily, Disp: 90 tablet, Rfl: 1  No Known Allergies    Past Medical History:   Diagnosis Date    ADHD (attention deficit hyperactivity disorder)     Bilateral carotid artery stenosis 2019    CAD (coronary artery disease)     Carotid artery stenosis     COPD [facility]chronic obstructive pulmonary disease) ([facility])     Erectile dysfunction     Esophageal erosions     Essential hypertension 2019    Folate deficiency     Gastroesophageal

## 2020-03-06 ENCOUNTER — HOSPITAL ENCOUNTER (OUTPATIENT)
Age: 55
Discharge: HOME | End: 2020-03-08
Payer: COMMERCIAL

## 2020-03-06 LAB
ALBUMIN SERPL-MCNC: 4.6 G/DL (ref 3.5–5.2)
ALP BLD-CCNC: 68 U/L (ref 40–129)
ALT SERPL-CCNC: 26 U/L (ref 0–40)
ANION GAP SERPL CALCULATED.3IONS-SCNC: 13 MMOL/L (ref 7–16)
AST SERPL-CCNC: 19 U/L (ref 0–39)
BASOPHILS ABSOLUTE: 0.06 E9/L (ref 0–0.2)
BASOPHILS RELATIVE PERCENT: 0.8 % (ref 0–2)
BILIRUB SERPL-MCNC: 0.4 MG/DL (ref 0–1.2)
BUN BLDV-MCNC: 12 MG/DL (ref 6–20)
CALCIUM SERPL-MCNC: 10 MG/DL (ref 8.6–10.2)
CHLORIDE BLD-SCNC: 104 MMOL/L (ref 98–107)
CHOLESTEROL, TOTAL: 140 MG/DL (ref 0–199)
CO2: 24 MMOL/L (ref 22–29)
CREAT SERPL-MCNC: 0.9 MG/DL (ref 0.7–1.2)
EOSINOPHILS ABSOLUTE: 0.17 E9/L (ref 0.05–0.5)
EOSINOPHILS RELATIVE PERCENT: 2.1 % (ref 0–6)
GFR AFRICAN AMERICAN: >60
GFR NON-AFRICAN AMERICAN: >60 ML/MIN/1.73
GLUCOSE BLD-MCNC: 96 MG/DL (ref 74–99)
HBA1C MFR BLD: 6 % (ref 4–5.6)
HCT VFR BLD CALC: 46 % (ref 37–54)
HDLC SERPL-MCNC: 57 MG/DL
HEMOGLOBIN: 14.7 G/DL (ref 12.5–16.5)
IMMATURE GRANULOCYTES #: 0.03 E9/L
IMMATURE GRANULOCYTES %: 0.4 % (ref 0–5)
LDL CHOLESTEROL CALCULATED: 71 MG/DL (ref 0–99)
LYMPHOCYTES ABSOLUTE: 2.26 E9/L (ref 1.5–4)
LYMPHOCYTES RELATIVE PERCENT: 28.3 % (ref 20–42)
MCH RBC QN AUTO: 30.1 PG (ref 26–35)
MCHC RBC AUTO-ENTMCNC: 32 % (ref 32–34.5)
MCV RBC AUTO: 94.3 FL (ref 80–99.9)
MONOCYTES ABSOLUTE: 0.75 E9/L (ref 0.1–0.95)
MONOCYTES RELATIVE PERCENT: 9.4 % (ref 2–12)
NEUTROPHILS ABSOLUTE: 4.73 E9/L (ref 1.8–7.3)
NEUTROPHILS RELATIVE PERCENT: 59 % (ref 43–80)
PDW BLD-RTO: 12.8 FL (ref 11.5–15)
PLATELET # BLD: 289 E9/L (ref 130–450)
PMV BLD AUTO: 12.6 FL (ref 7–12)
POTASSIUM SERPL-SCNC: 4.6 MMOL/L (ref 3.5–5)
RBC # BLD: 4.88 E12/L (ref 3.8–5.8)
SODIUM BLD-SCNC: 141 MMOL/L (ref 132–146)
TOTAL PROTEIN: 7.4 G/DL (ref 6.4–8.3)
TRIGL SERPL-MCNC: 59 MG/DL (ref 0–149)
VLDLC SERPL CALC-MCNC: 12 MG/DL
WBC # BLD: 8 E9/L (ref 4.5–11.5)

## 2020-03-06 PROCEDURE — 36415 COLL VENOUS BLD VENIPUNCTURE: CPT

## 2020-03-06 PROCEDURE — 83036 HEMOGLOBIN GLYCOSYLATED A1C: CPT

## 2020-03-06 PROCEDURE — 80061 LIPID PANEL: CPT

## 2020-03-06 PROCEDURE — 85025 COMPLETE CBC W/AUTO DIFF WBC: CPT

## 2020-03-06 PROCEDURE — 80053 COMPREHEN METABOLIC PANEL: CPT

## 2020-03-07 ENCOUNTER — TELEPHONE (OUTPATIENT)
Dept: FAMILY MEDICINE CLINIC | Age: 55
End: 2020-03-07

## 2020-05-21 ENCOUNTER — HOSPITAL ENCOUNTER (OUTPATIENT)
Age: 55
Discharge: HOME | End: 2020-05-23
Payer: COMMERCIAL

## 2020-05-21 ENCOUNTER — OFFICE VISIT (OUTPATIENT)
Dept: FAMILY MEDICINE CLINIC | Age: 55
End: 2020-05-21
Payer: COMMERCIAL

## 2020-05-21 ENCOUNTER — TELEPHONE (OUTPATIENT)
Dept: FAMILY MEDICINE CLINIC | Age: 55
End: 2020-05-21

## 2020-05-21 VITALS
DIASTOLIC BLOOD PRESSURE: 84 MMHG | OXYGEN SATURATION: 97 % | HEIGHT: 73 IN | SYSTOLIC BLOOD PRESSURE: 132 MMHG | BODY MASS INDEX: 27.57 KG/M2 | WEIGHT: 208 LBS | TEMPERATURE: 97.4 F | HEART RATE: 66 BPM

## 2020-05-21 LAB
ALBUMIN SERPL-MCNC: 4.7 G/DL (ref 3.5–5.2)
ALP BLD-CCNC: 65 U/L (ref 40–129)
ALT SERPL-CCNC: 18 U/L (ref 0–40)
ANION GAP SERPL CALCULATED.3IONS-SCNC: 19 MMOL/L (ref 7–16)
AST SERPL-CCNC: 22 U/L (ref 0–39)
BASOPHILS ABSOLUTE: 0.05 E9/L (ref 0–0.2)
BASOPHILS RELATIVE PERCENT: 0.7 % (ref 0–2)
BILIRUB SERPL-MCNC: 0.3 MG/DL (ref 0–1.2)
BUN BLDV-MCNC: 11 MG/DL (ref 6–20)
CALCIUM SERPL-MCNC: 9.7 MG/DL (ref 8.6–10.2)
CHLORIDE BLD-SCNC: 103 MMOL/L (ref 98–107)
CHOLESTEROL, TOTAL: 218 MG/DL (ref 0–199)
CO2: 18 MMOL/L (ref 22–29)
CREAT SERPL-MCNC: 0.9 MG/DL (ref 0.7–1.2)
EOSINOPHILS ABSOLUTE: 0.15 E9/L (ref 0.05–0.5)
EOSINOPHILS RELATIVE PERCENT: 2 % (ref 0–6)
GFR AFRICAN AMERICAN: >60
GFR NON-AFRICAN AMERICAN: >60 ML/MIN/1.73
GLUCOSE BLD-MCNC: 118 MG/DL (ref 74–99)
HCT VFR BLD CALC: 44.4 % (ref 37–54)
HDLC SERPL-MCNC: 56 MG/DL
HEMOGLOBIN: 14.5 G/DL (ref 12.5–16.5)
IMMATURE GRANULOCYTES #: 0.02 E9/L
IMMATURE GRANULOCYTES %: 0.3 % (ref 0–5)
LDL CHOLESTEROL CALCULATED: 149 MG/DL (ref 0–99)
LYMPHOCYTES ABSOLUTE: 2.52 E9/L (ref 1.5–4)
LYMPHOCYTES RELATIVE PERCENT: 33.6 % (ref 20–42)
MCH RBC QN AUTO: 30.9 PG (ref 26–35)
MCHC RBC AUTO-ENTMCNC: 32.7 % (ref 32–34.5)
MCV RBC AUTO: 94.5 FL (ref 80–99.9)
MONOCYTES ABSOLUTE: 0.67 E9/L (ref 0.1–0.95)
MONOCYTES RELATIVE PERCENT: 8.9 % (ref 2–12)
NEUTROPHILS ABSOLUTE: 4.08 E9/L (ref 1.8–7.3)
NEUTROPHILS RELATIVE PERCENT: 54.5 % (ref 43–80)
PDW BLD-RTO: 13.2 FL (ref 11.5–15)
PLATELET # BLD: 274 E9/L (ref 130–450)
PMV BLD AUTO: 12.3 FL (ref 7–12)
POTASSIUM SERPL-SCNC: 4.5 MMOL/L (ref 3.5–5)
RBC # BLD: 4.7 E12/L (ref 3.8–5.8)
SODIUM BLD-SCNC: 140 MMOL/L (ref 132–146)
TOTAL PROTEIN: 7.8 G/DL (ref 6.4–8.3)
TRIGL SERPL-MCNC: 63 MG/DL (ref 0–149)
VLDLC SERPL CALC-MCNC: 13 MG/DL
WBC # BLD: 7.5 E9/L (ref 4.5–11.5)

## 2020-05-21 PROCEDURE — 80061 LIPID PANEL: CPT

## 2020-05-21 PROCEDURE — 85025 COMPLETE CBC W/AUTO DIFF WBC: CPT

## 2020-05-21 PROCEDURE — 93000 ELECTROCARDIOGRAM COMPLETE: CPT | Performed by: INTERNAL MEDICINE

## 2020-05-21 PROCEDURE — 99214 OFFICE O/P EST MOD 30 MIN: CPT | Performed by: INTERNAL MEDICINE

## 2020-05-21 PROCEDURE — 36415 COLL VENOUS BLD VENIPUNCTURE: CPT

## 2020-05-21 PROCEDURE — 80053 COMPREHEN METABOLIC PANEL: CPT

## 2020-05-21 RX ORDER — PREDNISONE 10 MG/1
TABLET ORAL
Qty: 18 TABLET | Refills: 0 | Status: SHIPPED
Start: 2020-05-21 | End: 2020-05-26

## 2020-05-21 NOTE — PROGRESS NOTES
-   Medical Problems:  Hypertension - renal artery stenosis on the right-stent  Chronic Obstructive Pulmonary Disease (COPD), Gastroesophageal Reflux Disease (GERD)  esophageal erosions - EGD   Hyperlipidemia, Vitamin B12 and folate deficiency, Attention Deficit Hyperactivity Disorder  CAD - Mild per  heart cath  HSV, Fatty Liver, Left fibula fracture, Prostatitis, Erectile Dysfunction, carotid vascular disease-sees vascular  Surgical Hx:  Left Knee Arthroscopy - Dr Regan Tran with a stent in renal artery - ()  carotid endarterectomy-right - (10/2018)  Reviewed, no changes. FH:  Father:  Alcoholism -  age 79. Mother:  Cerebrovascular Accident (CVA), Non Insulin Dependent Diabetes,  age 76. Reviewed, no changes. SH: Patient is . -[employer] work  Personal Habits: Current cigarette smoker, has smoked 1 pack daily; quit 2015, restarted shortly  after this. Drinks 2 glasses of wine a day or more, advised to quit  when GERD got worse in 10/2018           Current Outpatient Medications:     predniSONE (DELTASONE) 10 MG tablet, Take 3 tablets by mouth daily for 3 days, THEN 2 tablets daily for 3 days, THEN 1 tablet daily for 3 days. , Disp: 18 tablet, Rfl: 0  No Known Allergies    Past Medical History:   Diagnosis Date    ADHD (attention deficit hyperactivity disorder)     Bilateral carotid artery stenosis 2019    CAD (coronary artery disease)     Carotid artery stenosis     COPD (chronic obstructive pulmonary disease) (HCC)     Erectile dysfunction     Esophageal erosions     Essential hypertension 2019    Folate deficiency     Gastroesophageal reflux disease without esophagitis 2019    HSV infection     Hyperlipidemia     Hypertension     Liver disease     fatty liver    Other hyperlipidemia 2019    Prostatitis     BRYCE (renal artery stenosis) ([address]     right-stent    Simple chronic bronchitis ([address] 2019    Vitamin B 12 deficiency      Past Surgical History:   Procedure Laterality Date    BACK SURGERY      ruptured disc    CARDIAC CATHETERIZATION      CAROTID ENDARTERECTOMY  10/2018    KNEE ARTHROPLASTY      KNEE ARTHROSCOPY Left      Family History   Problem Relation Age of Onset    Diabetes Mother     Stroke Mother     Alcohol Abuse Father      Social History     Socioeconomic History    Marital status:      Spouse name: Not on file    Number of children: Not on file    Years of education: Not on file    Highest education level: Not on file   Occupational History    Not on file   Social Needs    Financial resource strain: Not on file    Food insecurity     Worry: Not on file     Inability: Not on file    Transportation needs     Medical: Not on file     Non-medical: Not on file   Tobacco Use    Smoking status: Former Smoker     Packs/day: 1.50     Years: 37.00     Pack years: 55.50     Types: Cigars, Cigarettes     Last attempt to quit: 2020     Years since quittin.2    Smokeless tobacco: Never Used   Substance and Sexual Activity    Alcohol use:  Yes     Alcohol/week: 3.0 standard drinks     Types: 3 Glasses of wine per week     Frequency: 2-3 times a week     Drinks per session: 3 or 4     Binge frequency: Less than monthly    Drug use: No    Sexual activity: Not on file   Lifestyle    Physical activity     Days per week: Not on file     Minutes per session: Not on file    Stress: Not on file   Relationships    Social connections     Talks on phone: Not on file     Gets together: Not on file     Attends Buddhist service: Not on file     Active member of club or organization: Not on file     Attends meetings of clubs or organizations: Not on file     Relationship status: Not on file    Intimate partner violence     Fear of current or ex partner: Not on file     Emotionally abused: Not on file     Physically abused: Not on file     Forced sexual activity: Not on file   Other Topics Concern    Not on file   Social History Narrative    Not on file       Vitals:    05/21/20 0856   BP: 132/84   Pulse: 66   Temp: 97.4 °F (36.3 °C)   TempSrc: Temporal   SpO2: 97%   Weight: 208 lb (94.3 kg)   Height: 6' 1\" (1.854 m)       Physical Exam    Const: Appears well developed and well nourished. No signs of acute distress present. Eyes: EOMI in both eyes. PERRL. ENMT: . (External Ears) Tympanic membranes are intact. External nose WNL. Moistness and  normal color of the nasal mucosae. Nasal septum. (Septum) . (Teeth) Gums appear healthy. Posterior pharynx shows no exudate, positive for posterior pharyngeal drainage that is colored and irritation noted. Neck: Supple and symmetric. Palpation reveals no adenopathy. No masses appreciated. Thyroid  exhibits no nodule or thyromegaly. No JVD. Carotids: 2+ and equal bilaterally, without bruits/right  carotid endarterectomy scar noted  Resp: No rales, rhonchi or wheezes appreciated over the lungs bilaterally/slightly prolonged expiratory  phase. CV: Rate is regular. Rhythm is regular. S1 is normal. S2 is normal. No gallop or rubs. No heart. murmur appreciated. No reproducible tenderness to chest wall to palpation extremities: No clubbing, cyanosis or edema. Abdomen: Bowel sounds are normoactive. Palpation reveals softness, with no distension,  organomegaly or tenderness. No abdominal masses palpable. No palpable hepatosplenomegaly. Musculo: Walks with a normal gait. Upper Extremities: Full ROM bilaterally. He does have reproducible tenderness along the lateral epicondyle region to palpation. No redness warmth or swelling. He does have some reproducible tenderness to right mid metacarpal region and third finger. No redness warmth or swelling. He does have difficulty with  on that side. Lower Extremities:  Full ROM bilaterally. Skin: Dry and warm with no rash. Neuro: Alert and oriented x3.  Mood is normal. Affect is normal. Speech is with problems in the interim. Return in about 1 month (around 6/21/2020). Seen By:  Michelle Newman MD      *Document was created using voice recognition software. Note was reviewed however may contain grammatical errors.

## 2020-05-22 RX ORDER — SIMVASTATIN 20 MG
20 TABLET ORAL NIGHTLY
Qty: 90 TABLET | Refills: 1 | Status: SHIPPED
Start: 2020-05-22 | End: 2020-09-28 | Stop reason: SDUPTHER

## 2020-05-22 RX ORDER — DIMENHYDRINATE 50 MG
TABLET ORAL
COMMUNITY
End: 2020-05-22 | Stop reason: SDUPTHER

## 2020-05-22 RX ORDER — SIMVASTATIN 20 MG
20 TABLET ORAL NIGHTLY
COMMUNITY
End: 2020-05-22 | Stop reason: SDUPTHER

## 2020-05-22 RX ORDER — DIMENHYDRINATE 50 MG
1 TABLET ORAL DAILY
Qty: 90 CAPSULE | Refills: 1 | Status: SHIPPED
Start: 2020-05-22 | End: 2020-09-28 | Stop reason: SDUPTHER

## 2020-05-22 NOTE — TELEPHONE ENCOUNTER
Blood sugar marginal at 118. Will follow. Lipids are such that with his history I do wish to try him back on statin medication/simvastatin which he had been on a few years ago. Start 20 mg every evening and watch for any muscle achiness. Take co-Q10 with this. Will check lipid panel at next month's visit.   He was put in note field

## 2020-05-26 ENCOUNTER — OFFICE VISIT (OUTPATIENT)
Dept: ORTHOPEDIC SURGERY | Age: 55
End: 2020-05-26
Payer: COMMERCIAL

## 2020-05-26 VITALS — TEMPERATURE: 97.7 F | HEIGHT: 73 IN | BODY MASS INDEX: 27.17 KG/M2 | WEIGHT: 205 LBS

## 2020-05-26 PROCEDURE — 99203 OFFICE O/P NEW LOW 30 MIN: CPT | Performed by: NURSE PRACTITIONER

## 2020-05-26 NOTE — PROGRESS NOTES
reviewed and are negative           PHYSICAL EXAM     Vitals:    05/26/20 1104   Temp: 97.7 °F (36.5 °C)   TempSrc: Oral   Weight: 205 lb (93 kg)   Height: 6' 1\" (1.854 m)         Height: 6' 1\" (1.854 m)  Weight: [unfilled]  BMI:  Body mass index is 27.05 kg/m². General: The patient is alert and oriented x 3, appears to be stated age and in no distress. HEENT: head is normocephalic, atraumatic. EOMI. Neck: supple, trachea midline, no thyromegaly   Cardiovascular: peripheral pulses palpable. Normal Capillary refill   Respiratory: breathing unlabored, chest expansion symmetric   Skin: no rash, no open wounds, no erythema  Psych: normal affect; mood stable  Neurologic: gait normal, sensation grossly intact in extremities  MSK:    Shoulder:  Ipsilateral shoulder has normal range of motion, with no deformity. Normal musculature without atrophy    Elbow Exam:   Right elbow exam shows range of motion is intact with pain present on extension. Posterior tenderness present. No swelling, redness, or warmth present. Right hand and wrist exam range of motion intact. Negative Tinel's negative Phalen sign. Hard beady nodule felt in palm no redness or swelling present. Finger grasp 4/5. IMAGING:     No new imaging obtained previous imaging reviewed    Radiographic findings reviewed with patient    ASSESSMENT  Right elbow and hand pain       PLAN  Today we discussed his right hand and elbow. He has had ongoing pain for the last 8 years in his elbow pain is mostly with extension. Discussed his x-ray findings that show arthritic changes present. We discussed is hand pain with possible early dupuytren's contracture. Plan moving forward will be referral to Dr. Ruel Boxer who specializes in upper extremity. He is in agreement and verbalized understanding. Referral order was placed today.     Chip Brown [facility]  Orthopedic Surgery   05/26/20  11:46 AM    Staff Addendum    I have seen and evaluated the patient and

## 2020-06-26 ENCOUNTER — HOSPITAL ENCOUNTER (OUTPATIENT)
Age: 55
Discharge: HOME | End: 2020-06-28
Payer: COMMERCIAL

## 2020-06-26 ENCOUNTER — TELEPHONE (OUTPATIENT)
Dept: FAMILY MEDICINE CLINIC | Age: 55
End: 2020-06-26

## 2020-06-26 ENCOUNTER — OFFICE VISIT (OUTPATIENT)
Dept: FAMILY MEDICINE CLINIC | Age: 55
End: 2020-06-26
Payer: COMMERCIAL

## 2020-06-26 VITALS
WEIGHT: 208 LBS | BODY MASS INDEX: 27.57 KG/M2 | TEMPERATURE: 98 F | HEIGHT: 73 IN | SYSTOLIC BLOOD PRESSURE: 130 MMHG | DIASTOLIC BLOOD PRESSURE: 78 MMHG | OXYGEN SATURATION: 95 % | HEART RATE: 82 BPM

## 2020-06-26 LAB
ALT SERPL-CCNC: 20 U/L (ref 0–40)
AST SERPL-CCNC: 21 U/L (ref 0–39)
BACTERIA: NORMAL /HPF
BILIRUBIN URINE: NEGATIVE
BLOOD, URINE: NORMAL
CHOLESTEROL, TOTAL: 173 MG/DL (ref 0–199)
CLARITY: CLEAR
COLOR: YELLOW
GLUCOSE URINE: NEGATIVE MG/DL
HDLC SERPL-MCNC: 52 MG/DL
KETONES, URINE: NEGATIVE MG/DL
LDL CHOLESTEROL CALCULATED: 108 MG/DL (ref 0–99)
LEUKOCYTE ESTERASE, URINE: NEGATIVE
NITRITE, URINE: NEGATIVE
PH UA: 7.5 (ref 5–9)
PROTEIN UA: NEGATIVE MG/DL
RBC UA: NORMAL /HPF (ref 0–2)
SPECIFIC GRAVITY UA: 1.01 (ref 1–1.03)
TRIGL SERPL-MCNC: 64 MG/DL (ref 0–149)
UROBILINOGEN, URINE: 0.2 E.U./DL
VLDLC SERPL CALC-MCNC: 13 MG/DL
WBC UA: NORMAL /HPF (ref 0–5)

## 2020-06-26 PROCEDURE — 81001 URINALYSIS AUTO W/SCOPE: CPT

## 2020-06-26 PROCEDURE — 84460 ALANINE AMINO (ALT) (SGPT): CPT

## 2020-06-26 PROCEDURE — 36415 COLL VENOUS BLD VENIPUNCTURE: CPT

## 2020-06-26 PROCEDURE — 99214 OFFICE O/P EST MOD 30 MIN: CPT | Performed by: INTERNAL MEDICINE

## 2020-06-26 PROCEDURE — 80061 LIPID PANEL: CPT

## 2020-06-26 PROCEDURE — 81003 URINALYSIS AUTO W/O SCOPE: CPT | Performed by: INTERNAL MEDICINE

## 2020-06-26 PROCEDURE — 84450 TRANSFERASE (AST) (SGOT): CPT

## 2020-06-26 RX ORDER — PREDNISONE 10 MG/1
TABLET ORAL
Qty: 12 TABLET | Refills: 0 | Status: SHIPPED | OUTPATIENT
Start: 2020-06-26 | End: 2020-07-02

## 2020-06-28 NOTE — PROGRESS NOTES
[address] [facility]     20  Pily Venancio : 1965 Sex: male  Age: 63 y.o. Chief Complaint   Patient presents with    Hypertension       HPI      Last month for several issues. I did set him up with orthopedics because of complaint of right arm hand pain. Dr. Heron Hernandes saw him and referred him to Dr. Bean Taylor who has yet to see him. Currently can see him in the next week or so. He was complaining of some atypical chest discomfort for which we did an EKG that was unremarkable. I subsequently set him up for stress test which he never followed through with. States the pain is gone and is not going to do testing at this time. He has followed with vascular and has had carotid ultrasound to follow his carotid artery disease. He is stable and will not  need to be see him for a year. Blood pressure has been okay off of medications at this time. We did restart statin medication in the form of simvastatin last visit. He seems to be tolerating this well. I told him we would  recheck numbers today. Continues to complain of the arm pain and was asking for another course of prednisone which I will give him until he can get into orthopedics. Still complaining of urinary frequency no dysuria I told him we would check his urine today and treat if positive  Does remain smoke-free. He continues to follow with vascular for his carotid vascular disease      Review of Systems     Constitutional: Negative for activity change, appetite change, chills, diaphoresis, fatigue, fever and unexpected weight change.    HENT: Negative for ear pain, hearing loss  Respiratory: Negative for cough shortness of breath and wheezing.     Cardiovascular: Negative for, palpitations and leg swelling.  Positive for chest pain-see above  Gastrointestinal: Negative for abdominal pain, blood in stool, constipation, diarrhea, nausea and vomiting.        GERD improved   Endocrine: Negative.    Genitourinary: Negative for difficulty nodule or thyromegaly. No JVD. Carotids: 2+ and equal bilaterally, without bruits/right  carotid endarterectomy scar noted  Resp: No rales, rhonchi or wheezes appreciated over the lungs bilaterally/slightly prolonged expiratory  phase. CV: Rate is regular. Rhythm is regular. S1 is normal. S2 is normal. No gallop or rubs. No heart.   murmur appreciated.  No reproducible tenderness to chest wall to palpation extremities: No clubbing, cyanosis or edema. Abdomen: Bowel sounds are normoactive. Palpation reveals softness, with no distension,  organomegaly or tenderness. No abdominal masses palpable. No palpable hepatosplenomegaly. Musculo: Walks with a normal gait. Upper Extremities: Full ROM bilaterally. He does have reproducible tenderness along the lateral epicondyle region to palpation. No redness warmth or swelling. He does have some reproducible tenderness to right mid metacarpal region and third finger. No redness warmth or swelling. He does have difficulty with  on that side. Lower Extremities:  Full ROM bilaterally. Skin: Dry and warm with no rash. Neuro: Alert and oriented x3. Mood is normal. Affect is normal. Speech is articulate and fluent. Upper Extremities: motor strength is intact. Normal muscle tone bilaterally. Lower Extremities: motor  strength is intact. Normal muscle tone bilaterally. Sensation intact to light touch. Reflexes: DTR's are  symmetric, intact and 2+ bilaterally. Cranial Nerves: Cranial nerves II-XII intact. Psych: Patient's attitude is cooperative. Patient's affect is normal judgement is realistic. Insight is  appropriate. Assessment and Plan:  Jennifer Barroso was seen today for hypertension. Diagnoses and all orders for this visit:    Hand pain, right  Set up with Dr. Montoya of orthopedics    Right elbow pain    Essential hypertension  -     Lipid Panel; Future  -     ALT; Future  -     AST;  Future  Stable currently off of medication    Frequency of micturition  - Urinalysis; Future    Other hyperlipidemia  Proved: Currently on statin he is tolerating    Hyperglycemia  -     Lipid Panel; Future  -     ALT; Future  -     AST; Future    Other orders  -     predniSONE (DELTASONE) 10 MG tablet; Take 3 tablets by mouth daily for 2 days, THEN 2 tablets daily for 2 days, THEN 1 tablet daily for 2 days. End: I will see him back in 3 months and as needed. Continue smoking cessation. Will check urine regarding his frequency and treat appropriately. Check lipids today on his new statin medication. Continue follow-up with vascular and now orthopedics. Continue to monitor blood pressure closely. Short course of prednisone. Notify us with problems in the interim. No follow-ups on file. Seen By:  Shazia Neely MD      *Document was created using voice recognition software. Note was reviewed however may contain grammatical errors.

## 2020-07-06 ENCOUNTER — OFFICE VISIT (OUTPATIENT)
Dept: ORTHOPEDIC SURGERY | Age: 55
End: 2020-07-06
Payer: COMMERCIAL

## 2020-07-06 VITALS — HEIGHT: 73 IN | TEMPERATURE: 96.6 F | BODY MASS INDEX: 27.17 KG/M2 | RESPIRATION RATE: 18 BRPM | WEIGHT: 205 LBS

## 2020-07-06 PROCEDURE — 99203 OFFICE O/P NEW LOW 30 MIN: CPT | Performed by: ORTHOPAEDIC SURGERY

## 2020-07-06 PROCEDURE — 20551 NJX 1 TENDON ORIGIN/INSJ: CPT | Performed by: ORTHOPAEDIC SURGERY

## 2020-07-06 RX ORDER — BETAMETHASONE SODIUM PHOSPHATE AND BETAMETHASONE ACETATE 3; 3 MG/ML; MG/ML
6 INJECTION, SUSPENSION INTRA-ARTICULAR; INTRALESIONAL; INTRAMUSCULAR; SOFT TISSUE ONCE
Status: COMPLETED | OUTPATIENT
Start: 2020-07-06 | End: 2020-07-06

## 2020-07-06 RX ADMIN — BETAMETHASONE SODIUM PHOSPHATE AND BETAMETHASONE ACETATE 6 MG: 3; 3 INJECTION, SUSPENSION INTRA-ARTICULAR; INTRALESIONAL; INTRAMUSCULAR; SOFT TISSUE at 14:07

## 2020-07-06 NOTE — PROGRESS NOTES
DEPARTMENT OF HAND SURGERY   CONSULT NOTE    Chief Complaint   Patient presents with    Elbow Pain     right elbow pain    Wrist Pain     right wrist pain       Angelita Cordoba is a 63y.o. year old  male who presents for evaluation of right hand/wrist and elbow pain. he reports the elbow pain started 6 years ago and was completely relieved with injection. Patient does not remember where the injection was placed. Patient states the hand/wrist pain started a few months ago and was relieved with prednisone. he does not remember a specific injury that started the pain. The pain is worse with activity and better with rest.  The patient has tried OTC analgesics. The treatment has not been effective. The patient is Right hand dominant. The patients occupation is  but was a  previously.     Past Medical History:   Diagnosis Date    ADHD (attention deficit hyperactivity disorder)     Bilateral carotid artery stenosis 5/29/2019    CAD (coronary artery disease)     Carotid artery stenosis     COPD (chronic obstructive pulmonary disease) (HCC)     Erectile dysfunction     Esophageal erosions     Essential hypertension 5/29/2019    Folate deficiency     Gastroesophageal reflux disease without esophagitis 5/29/2019    HSV infection     Hyperlipidemia     Hypertension     Liver disease     fatty liver    Other hyperlipidemia 5/29/2019    Prostatitis     BRYCE (renal artery stenosis) (HCC)     right-stent    Simple chronic bronchitis ([address] 5/29/2019    Vitamin B 12 deficiency      Past Surgical History:   Procedure Laterality Date    BACK SURGERY      ruptured disc    CARDIAC CATHETERIZATION      CAROTID ENDARTERECTOMY  10/2018    KNEE ARTHROPLASTY      KNEE ARTHROSCOPY Left        Current Outpatient Medications:     Coenzyme Q10 (CO Q-10) 100 MG CAPS, Take 1 capsule by mouth daily, Disp: 90 capsule, Rfl: 1    simvastatin (ZOCOR) 20 MG tablet, Take 1 tablet by mouth nightly, rash,(-) psoriasis,(-) eczema, (-)skin cancer. Musculoskeletal: (-) fractures,  (-) dislocations,(-) collagen vascular disease, (-) fibromyalgia, (-) multiple sclerosis, (-) muscular dystrophy, (-) RSD,(-) joint pain (-)swelling, (-) joint pain,swelling. Neurologic: (-) epilepsy, (-)seizures,(-) brain tumor,(-) TIA, (-)stroke, (-)headaches, (-)Parkinson disease,(-) memory loss, (-) LOC. Cardiovascular: (-) Chest pain, (-) swelling in legs/feet, (-) SOB, (-) cramping in legs/feet with walking. Respiratory: (-) SOB, (-) Coughing, (-) night sweats. GI: (-) nausea, (-) vomiting, (-) diarrhea, (-) blood in stool, (-) gastric ulcer. Psychiatric: (-) Depression, (-) Anxiety, (-) bipolar disease, (-) Alzheimer's Disease  Allergic/Immunologic: (-) allergies latex, (-) allergies metal, (-) skin sensitivity. Hematlogic: (-) anemia, (-) blood transfusion, (-) DVT/PE, (-) Clotting disorders      SUBJECTIVE:        Constitution:    Alert and oriented x 3    Psycihatric:    The patient is alert and oriented x 3, appears to be stated age and in no distress. Respiratory:    Respiratory effort is not labored. Patient is not gasping. Palpation of the chest reveals no tactile fremitus. Skin:    Upon inspection: the skin appears warm, dry and intact. There is not a previous scar over the affected area. There is not any cellulitis, lymphedema or cutaneous lesions noted in the lower extremities. Upon palpation there is no induration noted. Neurologic:    Gait: normal;  Motor exam of the upper extremities show: The reflexes in biceps/triceps/brachioradialis are equal and symmetric. Sensory exam C5-T1 are normal bilaterally. Cardiovascular: The vascular exam is normal and is well perfused to distal extremities. There are 2+ radial pulses bilaterally, and motor and sensation is intact to median, ulnar, and radial, musclocutaneus, and axillary nerve distribution and grossly symmetric bilaterally. There is cap refill noted less than two seconds in all digits. There is not edema of the bilateral lower extremities. There is not varicosities noted in the distal extremities. Lymph:    Upon palpation,  there is no lymphadenopathy noted in bilateral lower extremities. Musculoskeletal:  Gait: normal; examination of the nails and digits reveal no cyanosis or clubbing. Elbow exam:    Evaluation of the elbow, reveals no signs of swelling or deformity. ROM is normal except for patient is not able to reach full extension, 5 degree extension lag. Patient is tender over mobile extensor wad. Mild pain with resisted extension at wrist/fingers. Pain with deep palpation into ulnohumeral joint. There is pain with palpation over PIN nerve at elbow. There is not instability with varus/valgus stresses. Motor strength is 5/5 with flexion/extension. Wrist exam:       Inspection of the bilateral upper extremities, there is no evidence of deformity of the wrist.  Full AROM/PROM at wrist. Motor strength is 5/5 with Dorsiflexion/Volarflexion/Supination/Pronation. Motor and sensation is intact and symmetric throughout the bilateral upper extremities in the median, ulnar and radial , musclcutaneous, and axillary nerve distributions. There is pain with sensitivity over dorsal aspect of thumb at wrist    Hand exam:    The skin overlying the hand is  intact. There is not evidence of scar, lesion, laceration, or abrasion. The motion in the small joints of the hand are intact with no stiffness or deformity. Full ROM at DIP and PIP joints. There is not rotational deformity. There is no masses or adenopathy in bilateral upper extremities. Radial pulses are 2+ and symmetric bilaterally. Capillary refill is intact and < 2 seconds. Motor strength is 5/5 with flexion and extension of the small finger joints.       Right:  Phallens sign negative, Tinnells sign negative, Median nerve compression test

## 2020-07-31 ENCOUNTER — HOSPITAL ENCOUNTER (OUTPATIENT)
Age: 55
Discharge: HOME | End: 2020-08-02
Payer: COMMERCIAL

## 2020-07-31 PROCEDURE — 88112 CYTOPATH CELL ENHANCE TECH: CPT

## 2020-07-31 PROCEDURE — G0103 PSA SCREENING: HCPCS

## 2020-08-01 LAB — PROSTATE SPECIFIC ANTIGEN: 1.01 NG/ML (ref 0–4)

## 2020-09-28 ENCOUNTER — TELEPHONE (OUTPATIENT)
Dept: FAMILY MEDICINE CLINIC | Age: 55
End: 2020-09-28

## 2020-09-28 ENCOUNTER — OFFICE VISIT (OUTPATIENT)
Dept: FAMILY MEDICINE CLINIC | Age: 55
End: 2020-09-28
Payer: COMMERCIAL

## 2020-09-28 VITALS
DIASTOLIC BLOOD PRESSURE: 86 MMHG | TEMPERATURE: 97.9 F | SYSTOLIC BLOOD PRESSURE: 136 MMHG | OXYGEN SATURATION: 95 % | HEART RATE: 63 BPM | HEIGHT: 72 IN | BODY MASS INDEX: 27.5 KG/M2 | WEIGHT: 203 LBS

## 2020-09-28 PROCEDURE — 99214 OFFICE O/P EST MOD 30 MIN: CPT | Performed by: INTERNAL MEDICINE

## 2020-09-28 RX ORDER — DIMENHYDRINATE 50 MG
1 TABLET ORAL DAILY
Qty: 90 CAPSULE | Refills: 1 | Status: SHIPPED
Start: 2020-09-28 | End: 2021-01-15 | Stop reason: SDUPTHER

## 2020-09-28 RX ORDER — SIMVASTATIN 20 MG
20 TABLET ORAL NIGHTLY
Qty: 90 TABLET | Refills: 1 | Status: SHIPPED
Start: 2020-09-28 | End: 2021-01-15 | Stop reason: SDUPTHER

## 2020-09-28 NOTE — PROGRESS NOTES
myalgias. Skin: Negative.    Allergic/Immunologic: Negative for environmental allergies and immunocompromised state. Neurological:Negative for dizziness, headache weakness, light-headedness and numbness.        Dizziness and headache resolved   Hematological: Negative.    Psychiatric/Behavioral: Positive for sleep disturbance. The patient is nervous/anxious.   Patient stopped his mirtazapine and is no longer on that         REST OF PERTINENT ROS GONE OVER AND WAS NEGATIVE. PMH:  Shot Record:  -YMSETSU Per 10 MG-NDC#[id number] Injection 08  -Pkhc Medrol up to 40mg Injection 06/11/15 12/05/14  -Lincomycin Hci To 300 MG (Lincocin) Injection 08  -Ztmw Medrol 40 MG Injection 10/28/17 06/22/09. Health Maintenance:  Colonoscopy Screening - (2016)  diverticulosis  EKG - (3/2007) 3/07,,, 10/14, ,   Stress Test - (2009) -negative,-neg  EGD - () GERD  esophageal erosions  Influenza Vaccination - (10/18/2018) Patient was Counseled, but refused. Heart catheterization - -mild CAD  Colonoscopy - -due 25  Carotid Artery Study - ,   Medical Problems:  Hypertension - renal artery stenosis on the right-stent  Chronic Obstructive Pulmonary Disease (COPD), Gastroesophageal Reflux Disease (GERD)  esophageal erosions - EGD   Hyperlipidemia, Vitamin B12 and folate deficiency, Attention Deficit Hyperactivity Disorder  CAD - Mild per  heart cath  HSV, Fatty Liver, Left fibula fracture, Prostatitis, Erectile Dysfunction, carotid vascular disease-sees vascular  Surgical Hx:  Left Knee Arthroscopy - Dr Lowry Drivers  Cardiac Cath with a stent in renal artery - ()  carotid endarterectomy-right - (10/2018)  Reviewed, no changes. FH:  Father:  Alcoholism -  age 79. Mother:  Cerebrovascular Accident (CVA), Non Insulin Dependent Diabetes,  age 76. Reviewed, no changes. 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   Quit again 2020.  Drinks 2 glasses of wine a day or more, advised to quit  when GERD got worse in 10/2018             Current Outpatient Medications:     Coenzyme Q10 (CO Q-10) 100 MG CAPS, Take 1 capsule by mouth daily, Disp: 90 capsule, Rfl: 1    simvastatin (ZOCOR) 20 MG tablet, Take 1 tablet by mouth nightly, Disp: 90 tablet, Rfl: 1  No Known Allergies    Past Medical History:   Diagnosis Date    ADHD (attention deficit hyperactivity disorder)     Bilateral carotid artery stenosis 5/29/2019    CAD (coronary artery disease)     Carotid artery stenosis     COPD (chronic obstructive pulmonary disease) (HCC)     Erectile dysfunction     Esophageal erosions     Essential hypertension 5/29/2019    Folate deficiency     Gastroesophageal reflux disease without esophagitis 5/29/2019    HSV infection     Hyperlipidemia     Hypertension     Liver disease     fatty liver    Other hyperlipidemia 5/29/2019    Prostatitis     BRYCE (renal artery stenosis) (HCC)     right-stent    Simple chronic bronchitis ([address] 5/29/2019    Vitamin B 12 deficiency      Past Surgical History:   Procedure Laterality Date    BACK SURGERY      ruptured disc    CARDIAC CATHETERIZATION      CAROTID ENDARTERECTOMY  10/2018    KNEE ARTHROPLASTY      KNEE ARTHROSCOPY Left      Family History   Problem Relation Age of Onset    Diabetes Mother     Stroke Mother     Alcohol Abuse Father      Social History     Socioeconomic History    Marital status:      Spouse name: Not on file    Number of children: Not on file    Years of education: Not on file    Highest education level: Not on file   Occupational History    Not on file   Social Needs    Financial resource strain: Not on file    Food insecurity     Worry: Not on file     Inability: Not on file    Transportation needs     Medical: Not on file     Non-medical: Not on file   Tobacco Use    Smoking status: Former Smoker     Packs/day: 1.50     Years: 37.00     Pack years: 55.50     Types: Cigars, Cigarettes     Last attempt to quit: 2020     Years since quittin.6    Smokeless tobacco: Never Used   Substance and Sexual Activity    Alcohol use: Yes     Alcohol/week: 3.0 standard drinks     Types: 3 Glasses of wine per week     Frequency: 2-3 times a week     Drinks per session: 3 or 4     Binge frequency: Less than monthly    Drug use: No    Sexual activity: Not on file   Lifestyle    Physical activity     Days per week: Not on file     Minutes per session: Not on file    Stress: Not on file   Relationships    Social connections     Talks on phone: Not on file     Gets together: Not on file     Attends Dru[employer] service: Not on file     Active member of club or organization: Not on file     Attends meetings of clubs or organizations: Not on file     Relationship status: Not on file    Intimate partner violence     Fear of current or ex partner: Not on file     Emotionally abused: Not on file     Physically abused: Not on file     Forced sexual activity: Not on file   Other Topics Concern    Not on file   Social History Narrative    Not on file       Vitals:    20 0905 20 0940   BP: (!) 146/86 136/86   Pulse: 63    Temp: 97.9 °F (36.6 °C)    TempSrc: Temporal    SpO2: 95%    Weight: 203 lb (92.1 kg)    Height: 6' (1.829 m)        Physical Exam    Const: Appears well developed and well nourished. No signs of acute distress present.     Neck: Supple and symmetric. Palpation reveals no adenopathy. No masses appreciated. Thyroid  exhibits no nodule or thyromegaly. No JVD. Carotids: 2+ and equal bilaterally, without bruits/right  carotid endarterectomy scar noted  Resp: No rales, rhonchi or wheezes appreciated over the lungs bilaterally/slightly prolonged expiratory  phase. CV: Rate is regular. Rhythm is regular.  S1 is normal. S2 is normal. No gallop or rubs. No heart.   murmur appreciated.  extremities: No clubbing, cyanosis or edema. Abdomen: Bowel sounds are normoactive. Palpation reveals softness, with no distension,  organomegaly or tenderness. No abdominal masses palpable. No palpable hepatosplenomegaly. Musculo: Walks with a normal gait. Upper Extremities: Full ROM bilaterally.    Lower Extremities:  Full ROM bilaterally. Patient does have some swelling and scabbed area to his medial right ankle with point tenderness to touch. Good pulses. Skin: Dry and warm with no rash. Neuro: Alert and oriented x3. Mood is normal. Affect is normal. Speech is articulate and fluent. Upper Extremities: motor strength is intact. Normal muscle tone bilaterally. Lower Extremities: motor  strength is intact. Normal muscle tone bilaterally. Sensation intact to light touch. Reflexes: DTR's are  symmetric, intact and 2+ bilaterally. Cranial Nerves: Cranial nerves II-XII intact. Psych: Patient's attitude is cooperative. Patient's affect is normal judgement is realistic. Insight is  appropriate. Assessment and Plan:  She Chaney was seen today for hypertension. Diagnoses and all orders for this visit:    Other hyperlipidemia  Improved on statin medication    Essential hypertension  Currently off of antihypertensive medication and numbers are stable    Other microscopic hematuria  Following with urology stable    Injury of right ankle, initial encounter  -     XR ANKLE RIGHT (MIN 3 VIEWS); Future    Other orders  -     Coenzyme Q10 (CO Q-10) 100 MG CAPS; Take 1 capsule by mouth daily  -     simvastatin (ZOCOR) 20 MG tablet; Take 1 tablet by mouth nightly  -     Apply ace wrap    Plan: I will see him back in the next 3 months and as needed. No blood work today. Reviewed everything from last visit. We will get x-ray of his ankle which I will visualize. We will Ace wrap the ankle. Continue to ice. Prescription management performed.   Will  have

## 2021-01-15 ENCOUNTER — OFFICE VISIT (OUTPATIENT)
Dept: FAMILY MEDICINE CLINIC | Age: 56
End: 2021-01-15
Payer: COMMERCIAL

## 2021-01-15 VITALS
HEIGHT: 72 IN | SYSTOLIC BLOOD PRESSURE: 128 MMHG | HEART RATE: 76 BPM | TEMPERATURE: 97.3 F | WEIGHT: 207.8 LBS | DIASTOLIC BLOOD PRESSURE: 82 MMHG | OXYGEN SATURATION: 96 % | BODY MASS INDEX: 28.15 KG/M2

## 2021-01-15 DIAGNOSIS — I65.29 STENOSIS OF CAROTID ARTERY, UNSPECIFIED LATERALITY: ICD-10-CM

## 2021-01-15 DIAGNOSIS — E78.49 OTHER HYPERLIPIDEMIA: ICD-10-CM

## 2021-01-15 DIAGNOSIS — R07.9 CHEST PAIN, UNSPECIFIED TYPE: ICD-10-CM

## 2021-01-15 DIAGNOSIS — I10 ESSENTIAL HYPERTENSION: ICD-10-CM

## 2021-01-15 DIAGNOSIS — Z92.29 HISTORY OF REGULAR MEDICATION USE: ICD-10-CM

## 2021-01-15 PROCEDURE — 99214 OFFICE O/P EST MOD 30 MIN: CPT | Performed by: INTERNAL MEDICINE

## 2021-01-15 RX ORDER — SIMVASTATIN 20 MG
20 TABLET ORAL NIGHTLY
Qty: 90 TABLET | Refills: 1 | Status: SHIPPED
Start: 2021-01-15 | End: 2021-05-21 | Stop reason: SDUPTHER

## 2021-01-15 RX ORDER — DIMENHYDRINATE 50 MG
1 TABLET ORAL DAILY
Qty: 90 CAPSULE | Refills: 1 | Status: SHIPPED
Start: 2021-01-15 | End: 2021-05-21 | Stop reason: SDUPTHER

## 2021-01-15 ASSESSMENT — PATIENT HEALTH QUESTIONNAIRE - PHQ9
SUM OF ALL RESPONSES TO PHQ QUESTIONS 1-9: 0
SUM OF ALL RESPONSES TO PHQ QUESTIONS 1-9: 0
2. FEELING DOWN, DEPRESSED OR HOPELESS: 0

## 2021-01-16 NOTE — PROGRESS NOTES
[address] [facility] [employer]     21  Nicci Garcia : 1965 Sex: male  Age: 63 y.o. Chief Complaint   Patient presents with    Hyperlipidemia       HPI  Patient presents today for 3-month follow-up visit on his medical problems. Patient's lipids have been stable on a statin medication which she is tolerating. This was started in the past several months. He did have statin intolerances in the past.  His blood pressure is stable from when he shows me off of his medication. Had been on lisinopril in the past we will continue to monitor. He does state that he had an episode of right-sided sharp chest pain. It was short-lived and sounding atypical without other cardiopulmonary symptoms associated with it. He did have a normal EKG and negative stress test within the past 6 months. I told him I doubted to be cardiac and would observe for now. If symptoms return to let me know. Does remain smoke-free. He continues to follow with vascular for his carotid vascular disease. He did see urology for microscopic hematuria and he states everything was okay and no longer follows with      Review of Systems     Constitutional: Negative for activity change, appetite change, chills, diaphoresis, fatigue, fever and unexpected weight change. HENT: Negative for ear pain, hearing loss  Respiratory: Negative for cough shortness of breath and wheezing.     Cardiovascular: Negative for, chest pain, palpitations and leg swelling. Gastrointestinal: Negative for abdominal pain, blood in stool, constipation, diarrhea, nausea and vomiting.        GERD improved   Endocrine: Negative.    Genitourinary: Negative for difficulty urinating, dysuria,, frequency hematuria and urgency.    Musculoskeletal: Positive for arthralgias and occasional right arm pain . Negative for back pain, gait problem and myalgias. Skin: Negative.    Allergic/Immunologic: Negative for environmental allergies and immunocompromised state. Neurological:Negative for dizziness, headache weakness, light-headedness and numbness.        Dizziness and headache resolved   Hematological: Negative.    Psychiatric/Behavioral: Positive for sleep disturbance. The patient is nervous/anxious. Declines medication for this.   Patient stopped his mirtazapine and is no longer on that         REST OF PERTINENT ROS GONE OVER AND WAS NEGATIVE. PMH:  Shot Record:  -XLGPVRF Per 10 MG-NDC#[id number] Injection 08  -Ktfb Medrol up to 40mg Injection 06/11/15 12/05/14  -Lincomycin Hci To 300 MG (Lincocin) Injection 08  -Pqyk Medrol 40 MG Injection 10/28/17 06/22/09. Health Maintenance:  Colonoscopy Screening - (2016)  diverticulosis  EKG - (3/2007) 3/07,,, 10/14, ,   Stress Test - (2009) -negative,-neg  EGD - () GERD  esophageal erosions  Influenza Vaccination - (10/18/2018) Patient was Counseled, but refused. Heart catheterization - -mild CAD  Colonoscopy - -due 26  Carotid Artery Study - ,   Medical Problems:  Hypertension - renal artery stenosis on the right-stent  Chronic Obstructive Pulmonary Disease (COPD), Gastroesophageal Reflux Disease (GERD)  esophageal erosions - EGD   Hyperlipidemia, Vitamin B12 and folate deficiency, Attention Deficit Hyperactivity Disorder  CAD - Mild per  heart cath  HSV, Fatty Liver, Left fibula fracture, Prostatitis, Erectile Dysfunction, carotid vascular disease-sees vascular  Surgical Hx:  Left Knee Arthroscopy - Dr Lata Hays  Cardiac Cath with a stent in renal artery - ()  carotid endarterectomy-right - (10/2018)  Reviewed, no changes. FH:  Father:  Alcoholism -  age 79. Mother:  Cerebrovascular Accident (CVA), Non Insulin Dependent Diabetes,  age 76. Reviewed, no changes. 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   Quit again 2020.  Drinks 2 glasses of wine a day or more, advised to quit  when GERD got worse in 10/2018                Current Outpatient Medications:     Coenzyme Q10 (CO Q-10) 100 MG CAPS, Take 1 capsule by mouth daily, Disp: 90 capsule, Rfl: 1    simvastatin (ZOCOR) 20 MG tablet, Take 1 tablet by mouth nightly, Disp: 90 tablet, Rfl: 1  No Known Allergies    Past Medical History:   Diagnosis Date    ADHD (attention deficit hyperactivity disorder)     Bilateral carotid artery stenosis 5/29/2019    CAD (coronary artery disease)     Carotid artery stenosis     COPD (chronic obstructive pulmonary disease) (HCC)     Erectile dysfunction     Esophageal erosions     Essential hypertension 5/29/2019    Folate deficiency     Gastroesophageal reflux disease without esophagitis 5/29/2019    HSV infection     Hyperlipidemia     Hypertension     Liver disease     fatty liver    Other hyperlipidemia 5/29/2019    Prostatitis     BRYCE (renal artery stenosis) (HCC)     right-stent    Simple chronic bronchitis ([address] 5/29/2019    Vitamin B 12 deficiency      Past Surgical History:   Procedure Laterality Date    BACK SURGERY      ruptured disc    CARDIAC CATHETERIZATION      CAROTID ENDARTERECTOMY  10/2018    KNEE ARTHROPLASTY      KNEE ARTHROSCOPY Left      Family History   Problem Relation Age of Onset    Diabetes Mother     Stroke Mother     Alcohol Abuse Father      Social History     Socioeconomic History    Marital status:      Spouse name: Not on file    Number of children: Not on file    Years of education: Not on file    Highest education level: Not on file   Occupational History    Not on file   Social Needs    Financial resource strain: Not on file    Food insecurity     Worry: Not on file     Inability: Not on file   [employer] needs Medical: Not on file     Non-medical: Not on file   Tobacco Use    Smoking status: Former Smoker     Packs/day: 1.50     Years: 37.00     Pack years: 55.50     Types: Cigars, Cigarettes     Quit date: 2020     Years since quittin.9    Smokeless tobacco: Never Used   Substance and Sexual Activity    Alcohol use: Yes     Alcohol/week: 3.0 standard drinks     Types: 3 Glasses of wine per week     Frequency: 2-3 times a week     Drinks per session: 3 or 4     Binge frequency: Less than monthly    Drug use: No    Sexual activity: Not on file   Lifestyle    Physical activity     Days per week: Not on file     Minutes per session: Not on file    Stress: Not on file   Relationships    Social connections     Talks on phone: Not on file     Gets together: Not on file     Attends Scientologist service: Not on file     Active member of club or organization: Not on file     Attends meetings of clubs or organizations: Not on file     Relationship status: Not on file    Intimate partner violence     Fear of current or ex partner: Not on file     Emotionally abused: Not on file     Physically abused: Not on file     Forced sexual activity: Not on file   Other Topics Concern    Not on file   Social History Narrative    Not on file       Vitals:    01/15/21 0858   BP: 128/82   Pulse: 76   Temp: 97.3 °F (36.3 °C)   TempSrc: Temporal   SpO2: 96%   Weight: 207 lb 12.8 oz (94.3 kg)   Height: 6' (1.829 m)       Physical Exam    Const: Appears well developed and well nourished. No signs of acute distress present.     Neck: Supple and symmetric. Palpation reveals no adenopathy. No masses appreciated. Thyroid  exhibits no nodule or thyromegaly. No JVD. Carotids: 2+ and equal bilaterally, without bruits/right  carotid endarterectomy scar noted  Resp: No rales, rhonchi or wheezes appreciated over the lungs bilaterally/slightly prolonged expiratory  phase. CV: Rate is regular. Rhythm is regular. S1 is normal. S2 is normal. No gallop or rubs. No heart.   murmur appreciated.    extremities: No clubbing, cyanosis or edema. Abdomen: Bowel sounds are normoactive. Palpation reveals softness, with no distension,  organomegaly or tenderness. No abdominal masses palpable. No palpable hepatosplenomegaly. Musculo: Walks with a normal gait. Upper Extremities: Full ROM bilaterally.    Lower Extremities:  Full ROM bilaterally. Good pulses. Skin: Dry and warm with no rash. Neuro: Alert and oriented x3. Mood is normal. Affect is normal. Speech is articulate and fluent. Upper Extremities: motor strength is intact. Normal muscle tone bilaterally. Lower Extremities: motor  strength is intact. Normal muscle tone bilaterally. Sensation intact to light touch. Reflexes: DTR's are  symmetric, intact and 2+ bilaterally. Cranial Nerves: Cranial nerves II-XII intact. Psych: Patient's attitude is cooperative. Patient's affect is normal judgement is realistic. Insight is  appropriate. Assessment and Plan:  Gabriella Rios was seen today for hyperlipidemia. Diagnoses and all orders for this visit:    Other hyperlipidemia  -     Lipid Panel; Future  Stable on statin medication    Essential hypertension  -     Comprehensive Metabolic Panel; Future  -     CBC Auto Differential; Future  Formerly on antihypertensive medication, currently off and blood pressure stable. Chest pain, unspecified type  Atypical    COPD  Stable on no medication currently    Stenosis of carotid artery, unspecified laterality  Stable and following with vascular    Other orders  -     Coenzyme Q10 (CO Q-10) 100 MG CAPS; Take 1 capsule by mouth daily  -     simvastatin (ZOCOR) 20 MG tablet;  Take 1 tablet by mouth nightly Plan: Blood work to monitor disease progression and medication use. Prescription management performed. Continue to monitor blood pressure. Patient declines both flu vaccine and Pneumovax. I did offer low-dose CAT scan of the chest which he declines at this point and states he wants to wait. See back in 4 months and as needed. Notify us of problems in the interim. Return in about 4 months (around 5/15/2021). Seen By:  Dominic Guajardo MD      *Document was created using voice recognition software. Note was reviewed however may contain grammatical errors.

## 2021-01-29 DIAGNOSIS — E78.49 OTHER HYPERLIPIDEMIA: ICD-10-CM

## 2021-01-29 DIAGNOSIS — I10 ESSENTIAL HYPERTENSION: ICD-10-CM

## 2021-01-29 LAB
ALBUMIN SERPL-MCNC: 4.5 G/DL (ref 3.5–5.2)
ALP BLD-CCNC: 67 U/L (ref 40–129)
ALT SERPL-CCNC: 18 U/L (ref 0–40)
ANION GAP SERPL CALCULATED.3IONS-SCNC: 16 MMOL/L (ref 7–16)
AST SERPL-CCNC: 17 U/L (ref 0–39)
BASOPHILS ABSOLUTE: 0.08 E9/L (ref 0–0.2)
BASOPHILS RELATIVE PERCENT: 0.9 % (ref 0–2)
BILIRUB SERPL-MCNC: 0.4 MG/DL (ref 0–1.2)
BUN BLDV-MCNC: 10 MG/DL (ref 6–20)
CALCIUM SERPL-MCNC: 9.8 MG/DL (ref 8.6–10.2)
CHLORIDE BLD-SCNC: 105 MMOL/L (ref 98–107)
CHOLESTEROL, TOTAL: 232 MG/DL (ref 0–199)
CO2: 21 MMOL/L (ref 22–29)
CREAT SERPL-MCNC: 0.8 MG/DL (ref 0.7–1.2)
EOSINOPHILS ABSOLUTE: 0.17 E9/L (ref 0.05–0.5)
EOSINOPHILS RELATIVE PERCENT: 1.8 % (ref 0–6)
GFR AFRICAN AMERICAN: >60
GFR NON-AFRICAN AMERICAN: >60 ML/MIN/1.73
GLUCOSE BLD-MCNC: 110 MG/DL (ref 74–99)
HCT VFR BLD CALC: 45.9 % (ref 37–54)
HDLC SERPL-MCNC: 51 MG/DL
HEMOGLOBIN: 14.7 G/DL (ref 12.5–16.5)
IMMATURE GRANULOCYTES #: 0.03 E9/L
IMMATURE GRANULOCYTES %: 0.3 % (ref 0–5)
LDL CHOLESTEROL CALCULATED: 167 MG/DL (ref 0–99)
LYMPHOCYTES ABSOLUTE: 2.24 E9/L (ref 1.5–4)
LYMPHOCYTES RELATIVE PERCENT: 24.2 % (ref 20–42)
MCH RBC QN AUTO: 30 PG (ref 26–35)
MCHC RBC AUTO-ENTMCNC: 32 % (ref 32–34.5)
MCV RBC AUTO: 93.7 FL (ref 80–99.9)
MONOCYTES ABSOLUTE: 0.71 E9/L (ref 0.1–0.95)
MONOCYTES RELATIVE PERCENT: 7.7 % (ref 2–12)
NEUTROPHILS ABSOLUTE: 6.03 E9/L (ref 1.8–7.3)
NEUTROPHILS RELATIVE PERCENT: 65.1 % (ref 43–80)
PDW BLD-RTO: 13.3 FL (ref 11.5–15)
PLATELET # BLD: 295 E9/L (ref 130–450)
PMV BLD AUTO: 12 FL (ref 7–12)
POTASSIUM SERPL-SCNC: 4.5 MMOL/L (ref 3.5–5)
RBC # BLD: 4.9 E12/L (ref 3.8–5.8)
SODIUM BLD-SCNC: 142 MMOL/L (ref 132–146)
TOTAL PROTEIN: 7.3 G/DL (ref 6.4–8.3)
TRIGL SERPL-MCNC: 69 MG/DL (ref 0–149)
VLDLC SERPL CALC-MCNC: 14 MG/DL
WBC # BLD: 9.3 E9/L (ref 4.5–11.5)

## 2021-01-30 ENCOUNTER — TELEPHONE (OUTPATIENT)
Dept: FAMILY MEDICINE CLINIC | Age: 56
End: 2021-01-30

## 2021-01-30 DIAGNOSIS — E78.49 OTHER HYPERLIPIDEMIA: Primary | ICD-10-CM

## 2021-01-30 DIAGNOSIS — I10 ESSENTIAL HYPERTENSION: ICD-10-CM

## 2021-01-30 NOTE — TELEPHONE ENCOUNTER
Lipids have jumped significantly. See if he is taking his simvastatin daily. If he is taking it daily he needs to double the dose to 40 mg daily. Fasting lipids in 6 weeks. Also blood sugar is elevated. He needs to decrease concentrated sweets and carbohydrate intake.   Will follow

## 2021-02-01 NOTE — TELEPHONE ENCOUNTER
Patient does not take rx regularly. States he does not remember too. He will take it every night and recheck labs in 6 weeks. Is that ok? Does not wish to increase dose at this time.

## 2021-03-19 ENCOUNTER — TELEPHONE (OUTPATIENT)
Dept: FAMILY MEDICINE CLINIC | Age: 56
End: 2021-03-19

## 2021-03-19 DIAGNOSIS — L91.8 SKIN TAGS, MULTIPLE ACQUIRED: Primary | ICD-10-CM

## 2021-03-19 NOTE — TELEPHONE ENCOUNTER
Kim Espitia calling he would like a referral to a dermatologist for skin tags under his right arm. They are becoming very bothersome. He is ok with shaista. I pended for dr devries.

## 2021-05-11 ENCOUNTER — TELEPHONE (OUTPATIENT)
Dept: ORTHOPEDIC SURGERY | Age: 56
End: 2021-05-11

## 2021-05-11 DIAGNOSIS — M25.522 LEFT ELBOW PAIN: Primary | ICD-10-CM

## 2021-05-13 ENCOUNTER — OFFICE VISIT (OUTPATIENT)
Dept: ORTHOPEDIC SURGERY | Age: 56
End: 2021-05-13
Payer: COMMERCIAL

## 2021-05-13 VITALS — RESPIRATION RATE: 18 BRPM | WEIGHT: 205 LBS | HEIGHT: 73 IN | BODY MASS INDEX: 27.17 KG/M2

## 2021-05-13 DIAGNOSIS — G56.01 RIGHT CARPAL TUNNEL SYNDROME: ICD-10-CM

## 2021-05-13 DIAGNOSIS — R20.0 NUMBNESS AND TINGLING IN RIGHT HAND: Primary | ICD-10-CM

## 2021-05-13 DIAGNOSIS — R20.2 NUMBNESS AND TINGLING IN RIGHT HAND: Primary | ICD-10-CM

## 2021-05-13 DIAGNOSIS — M77.8 LEFT ELBOW TENDONITIS: ICD-10-CM

## 2021-05-13 PROCEDURE — 99213 OFFICE O/P EST LOW 20 MIN: CPT | Performed by: ORTHOPAEDIC SURGERY

## 2021-05-13 PROCEDURE — 20526 THER INJECTION CARP TUNNEL: CPT | Performed by: ORTHOPAEDIC SURGERY

## 2021-05-13 RX ORDER — BETAMETHASONE SODIUM PHOSPHATE AND BETAMETHASONE ACETATE 3; 3 MG/ML; MG/ML
6 INJECTION, SUSPENSION INTRA-ARTICULAR; INTRALESIONAL; INTRAMUSCULAR; SOFT TISSUE ONCE
Status: COMPLETED | OUTPATIENT
Start: 2021-05-13 | End: 2021-05-13

## 2021-05-13 RX ADMIN — BETAMETHASONE SODIUM PHOSPHATE AND BETAMETHASONE ACETATE 6 MG: 3; 3 INJECTION, SUSPENSION INTRA-ARTICULAR; INTRALESIONAL; INTRAMUSCULAR; SOFT TISSUE at 13:15

## 2021-05-13 NOTE — PROGRESS NOTES
Chief Complaint   Patient presents with    Elbow Pain     L elbow pain     Arm Pain     R elbow and wrist pain          Christo Sadler is a 61y.o. year old  who presents for follow up of right upper extremity complaints. He has previously been treated for lateral epicondylitis. He reports good results with injections in the past.  He is now experiencing diffuse upper extremity pain radiating from his elbow into his shoulder as well as distally into his hand. Does complain of some numbness and tingling about the hand. Positive nocturnal symptoms. In regards to his left upper extremity he also is complaining of increasing pain over the lateral epicondyle. He reports he is doing some increased work around the house. He reports exacerbation of symptoms in his left elbow which have improved since scheduling his appointment for evaluation of his left elbow.       Past Medical History:   Diagnosis Date    ADHD (attention deficit hyperactivity disorder)     Bilateral carotid artery stenosis 5/29/2019    CAD (coronary artery disease)     Carotid artery stenosis     COPD [facility]) [facility]     Erectile dysfunction     Esophageal erosions     Essential hypertension 5/29/2019    Folate deficiency     Gastroesophageal reflux disease without esophagitis 5/29/2019    HSV infection     Hyperlipidemia     Hypertension     Liver disease     fatty liver    Other hyperlipidemia 5/29/2019    Prostatitis     BRYCE (renal artery stenosis) ([facility])     right-stent    Simple chronic bronchitis ([address] 5/29/2019    Vitamin B 12 deficiency      Past Surgical History:   Procedure Laterality Date    BACK SURGERY      ruptured disc    CARDIAC CATHETERIZATION      CAROTID ENDARTERECTOMY  10/2018    KNEE ARTHROPLASTY      KNEE ARTHROSCOPY Left        Current Outpatient Medications:     Coenzyme Q10 (CO Q-10) 100 MG CAPS, Take 1 capsule by mouth daily, Disp: 90 capsule, Rfl: 1    simvastatin (ZOCOR) 20 MG tablet, Take 1 tablet by mouth nightly, Disp: 90 tablet, Rfl: 1  No Known Allergies  Social History     Socioeconomic History    Marital status:      Spouse name: Not on file    Number of children: Not on file    Years of education: Not on file    Highest education level: Not on file   Occupational History    Not on file   Social Needs    Financial resource strain: Not on file    Food insecurity     Worry: Not on file     Inability: Not on file    Transportation needs     Medical: Not on file     Non-medical: Not on file   Tobacco Use    Smoking status: Former Smoker     Packs/day: 1.50     Years: 37.00     Pack years: 55.50     Types: Cigars, Cigarettes     Quit date: 2020     Years since quittin.2    Smokeless tobacco: Never Used   Substance and Sexual Activity    Alcohol use: Yes     Alcohol/week: 3.0 standard drinks     Types: 3 Glasses of wine per week     Frequency: 2-3 times a week     Drinks per session: 3 or 4     Binge frequency: Less than monthly    Drug use: No    Sexual activity: Not on file   Lifestyle    Physical activity     Days per week: Not on file     Minutes per session: Not on file    Stress: Not on file   Relationships    Social connections     Talks on phone: Not on file     Gets together: Not on file     Attends Muslim service: Not on file     Active member of club or organization: Not on file     Attends meetings of clubs or organizations: Not on file     Relationship status: Not on file    Intimate partner violence     Fear of current or ex partner: Not on file     Emotionally abused: Not on file     Physically abused: Not on file     Forced sexual activity: Not on file   Other Topics Concern    Not on file   Social History Narrative    Not on file     Family History   Problem Relation Age of Onset    Diabetes Mother     Stroke Mother     Alcohol Abuse Father        Skin: (-) rash,(-) psoriasis,(-) eczema, (-)skin cancer. Musculoskeletal: Chronic right upper extremity pain, new onset left elbow pain  Neurologic: (-) epilepsy, (-)seizures,(-) brain tumor,(-) TIA, (-)stroke, (-)headaches, (-)Parkinson disease,(-) memory loss, (-) LOC. Cardiovascular: (-) Chest pain, (-) swelling in legs/feet, (-) SOB, (-) cramping in legs/feet with walking. SUBJECTIVE:      Constitutional:    The patient is alert and oriented x 3, appears to be stated age and in no distress. Right upper extremity: Positive tenderness along head of the biceps. Negative impingement maneuver. Forward flexion 170 degrees. External rotation 20 degrees. Negative drop arm test.  Nontender over the lateral epicondyle of the elbow. Hypersensitivity over the ulnar nerve positive Tinel's. Mildly tender of the anterior elbow. Positive Tinel's and positive Mercy's maneuver at the wrist with numbness and tingling radiating into the thumb index and middle fingers. Full wrist flexion extension. Negative pain with resisted wrist extension. Radial, ulnar, median nerves grossly intact light touch. 2+ radial pulse. Left elbow: Nontender of the lateral epicondyle. Nontender over the medial condyle. Full shoulder and elbow and wrist range of motion. Negative provocative findings. Xrays: X-rays of his left elbow AP and lateral views were obtained today in the office. Small posterior osteophyte of the olecranon. Negative for acute fracture dislocations. Impression office x-rays: Left elbow negative for acute fracture dislocations  Radiographic findings reviewed with patient      Impression:   Encounter Diagnoses   Name Primary?  Numbness and tingling in right hand Yes    Left elbow tendonitis     Right carpal tunnel syndrome    Chronic intermittent right upper extremity pain over 6 years duration. Clinical findings suggestive of ulnar neuritis at elbow and carpal tunnel syndrome at today's visit.   Tenderness over the long head of the biceps at the shoulder. Probable left elbow lateral epicondylitis which has since improved  ADHD, CAD, COPD  GERD  Peripheral vascular disease    Plan: Today's findings were explained the patient. Recommended EMG nerve conduction of the right upper extremity. Patient was explained and offered and accepted a cortisone injection for carpal tunnel syndrome. Is also provided a wrist brace. Follow-up after nerve test is completed. Procedure Note Carpal Tunnel Injection    The right carpal tunnel was identified as the injection site. The risk and benefits of a cortisone injection were explained and the patient consented to the injection. Under sterile conditions, the carpal canal was injected with a mixture of 1 mL of 1% Lidocaine and 1 mL of 6 mg/mL Betamethasone without complication. A sterile bandage was applied.

## 2021-05-21 ENCOUNTER — TELEPHONE (OUTPATIENT)
Dept: FAMILY MEDICINE CLINIC | Age: 56
End: 2021-05-21

## 2021-05-21 ENCOUNTER — OFFICE VISIT (OUTPATIENT)
Dept: FAMILY MEDICINE CLINIC | Age: 56
End: 2021-05-21
Payer: COMMERCIAL

## 2021-05-21 VITALS
HEIGHT: 73 IN | OXYGEN SATURATION: 96 % | DIASTOLIC BLOOD PRESSURE: 82 MMHG | TEMPERATURE: 97.5 F | SYSTOLIC BLOOD PRESSURE: 136 MMHG | BODY MASS INDEX: 27.54 KG/M2 | HEART RATE: 76 BPM | WEIGHT: 207.8 LBS

## 2021-05-21 DIAGNOSIS — I10 ESSENTIAL HYPERTENSION: ICD-10-CM

## 2021-05-21 DIAGNOSIS — Z92.29 HISTORY OF REGULAR MEDICATION USE: ICD-10-CM

## 2021-05-21 DIAGNOSIS — F41.9 ANXIETY: ICD-10-CM

## 2021-05-21 DIAGNOSIS — E78.49 OTHER HYPERLIPIDEMIA: ICD-10-CM

## 2021-05-21 DIAGNOSIS — R73.9 HYPERGLYCEMIA: ICD-10-CM

## 2021-05-21 DIAGNOSIS — I65.29 STENOSIS OF CAROTID ARTERY, UNSPECIFIED LATERALITY: ICD-10-CM

## 2021-05-21 LAB
ALBUMIN SERPL-MCNC: 4.3 G/DL (ref 3.5–5.2)
ALP BLD-CCNC: 70 U/L (ref 40–129)
ALT SERPL-CCNC: 17 U/L (ref 0–40)
ANION GAP SERPL CALCULATED.3IONS-SCNC: 12 MMOL/L (ref 7–16)
AST SERPL-CCNC: 15 U/L (ref 0–39)
BASOPHILS ABSOLUTE: 0.05 E9/L (ref 0–0.2)
BASOPHILS RELATIVE PERCENT: 0.5 % (ref 0–2)
BILIRUB SERPL-MCNC: 0.2 MG/DL (ref 0–1.2)
BUN BLDV-MCNC: 10 MG/DL (ref 6–20)
CALCIUM SERPL-MCNC: 9.5 MG/DL (ref 8.6–10.2)
CHLORIDE BLD-SCNC: 102 MMOL/L (ref 98–107)
CHOLESTEROL, TOTAL: 165 MG/DL (ref 0–199)
CO2: 22 MMOL/L (ref 22–29)
CREAT SERPL-MCNC: 0.7 MG/DL (ref 0.7–1.2)
EOSINOPHILS ABSOLUTE: 0.1 E9/L (ref 0.05–0.5)
EOSINOPHILS RELATIVE PERCENT: 1 % (ref 0–6)
GFR AFRICAN AMERICAN: >60
GFR NON-AFRICAN AMERICAN: >60 ML/MIN/1.73
GLUCOSE BLD-MCNC: 108 MG/DL (ref 74–99)
HCT VFR BLD CALC: 44.5 % (ref 37–54)
HDLC SERPL-MCNC: 57 MG/DL
HEMOGLOBIN: 14.3 G/DL (ref 12.5–16.5)
IMMATURE GRANULOCYTES #: 0.04 E9/L
IMMATURE GRANULOCYTES %: 0.4 % (ref 0–5)
LDL CHOLESTEROL CALCULATED: 96 MG/DL (ref 0–99)
LYMPHOCYTES ABSOLUTE: 2.32 E9/L (ref 1.5–4)
LYMPHOCYTES RELATIVE PERCENT: 24 % (ref 20–42)
MCH RBC QN AUTO: 30.4 PG (ref 26–35)
MCHC RBC AUTO-ENTMCNC: 32.1 % (ref 32–34.5)
MCV RBC AUTO: 94.7 FL (ref 80–99.9)
MONOCYTES ABSOLUTE: 0.84 E9/L (ref 0.1–0.95)
MONOCYTES RELATIVE PERCENT: 8.7 % (ref 2–12)
NEUTROPHILS ABSOLUTE: 6.33 E9/L (ref 1.8–7.3)
NEUTROPHILS RELATIVE PERCENT: 65.4 % (ref 43–80)
PDW BLD-RTO: 13.2 FL (ref 11.5–15)
PLATELET # BLD: 303 E9/L (ref 130–450)
PMV BLD AUTO: 12 FL (ref 7–12)
POTASSIUM SERPL-SCNC: 4.1 MMOL/L (ref 3.5–5)
RBC # BLD: 4.7 E12/L (ref 3.8–5.8)
SODIUM BLD-SCNC: 136 MMOL/L (ref 132–146)
TOTAL PROTEIN: 7.3 G/DL (ref 6.4–8.3)
TRIGL SERPL-MCNC: 61 MG/DL (ref 0–149)
VLDLC SERPL CALC-MCNC: 12 MG/DL
WBC # BLD: 9.7 E9/L (ref 4.5–11.5)

## 2021-05-21 PROCEDURE — 99214 OFFICE O/P EST MOD 30 MIN: CPT | Performed by: INTERNAL MEDICINE

## 2021-05-21 RX ORDER — CLONAZEPAM 1 MG/1
1 TABLET ORAL DAILY PRN
Qty: 30 TABLET | Refills: 1 | Status: SHIPPED | OUTPATIENT
Start: 2021-05-21 | End: 2022-06-27

## 2021-05-21 RX ORDER — DIMENHYDRINATE 50 MG
1 TABLET ORAL DAILY
Qty: 90 CAPSULE | Refills: 1 | Status: SHIPPED | OUTPATIENT
Start: 2021-05-21

## 2021-05-21 RX ORDER — SIMVASTATIN 20 MG
20 TABLET ORAL NIGHTLY
Qty: 90 TABLET | Refills: 1 | Status: SHIPPED
Start: 2021-05-21 | End: 2021-10-27 | Stop reason: SDUPTHER

## 2021-05-21 SDOH — ECONOMIC STABILITY: FOOD INSECURITY: WITHIN THE PAST 12 MONTHS, YOU WORRIED THAT YOUR FOOD WOULD RUN OUT BEFORE YOU GOT MONEY TO BUY MORE.: NEVER TRUE

## 2021-05-21 SDOH — ECONOMIC STABILITY: FOOD INSECURITY: WITHIN THE PAST 12 MONTHS, THE FOOD YOU BOUGHT JUST DIDN'T LAST AND YOU DIDN'T HAVE MONEY TO GET MORE.: NEVER TRUE

## 2021-05-22 ENCOUNTER — TELEPHONE (OUTPATIENT)
Dept: FAMILY MEDICINE CLINIC | Age: 56
End: 2021-05-22

## 2021-05-24 RX ORDER — ASPIRIN 81 MG/1
81 TABLET ORAL DAILY
COMMUNITY

## 2021-05-27 ENCOUNTER — OFFICE VISIT (OUTPATIENT)
Dept: NEUROLOGY | Age: 56
End: 2021-05-27
Payer: COMMERCIAL

## 2021-05-27 VITALS
SYSTOLIC BLOOD PRESSURE: 130 MMHG | DIASTOLIC BLOOD PRESSURE: 80 MMHG | BODY MASS INDEX: 27.17 KG/M2 | WEIGHT: 205 LBS | HEIGHT: 73 IN

## 2021-05-27 DIAGNOSIS — M79.601 RIGHT ARM PAIN: ICD-10-CM

## 2021-05-27 DIAGNOSIS — M54.12 RIGHT CERVICAL RADICULOPATHY: ICD-10-CM

## 2021-05-27 DIAGNOSIS — M77.11 RIGHT LATERAL EPICONDYLITIS: ICD-10-CM

## 2021-05-27 DIAGNOSIS — R20.2 NUMBNESS AND TINGLING IN RIGHT HAND: ICD-10-CM

## 2021-05-27 DIAGNOSIS — G56.01 RIGHT CARPAL TUNNEL SYNDROME: Primary | ICD-10-CM

## 2021-05-27 DIAGNOSIS — R20.0 NUMBNESS AND TINGLING IN RIGHT HAND: ICD-10-CM

## 2021-05-27 PROCEDURE — 95886 MUSC TEST DONE W/N TEST COMP: CPT | Performed by: PSYCHIATRY & NEUROLOGY

## 2021-05-27 PROCEDURE — 95909 NRV CNDJ TST 5-6 STUDIES: CPT | Performed by: PSYCHIATRY & NEUROLOGY

## 2021-05-27 NOTE — PROGRESS NOTES
[address] [facility]  Electrodiagnostic Laboratory  *Accredited by the [facility] with exemplary status  [address] [facility], [facility]  Phone: [phone]  Fax: [phone]    Referring Provider: Umer Sawant MD  Primary Care Physician: Alannah Richardson MD  Patient Name: Sarah Schwarz  Patient YOB: 1965  Gender: male  BMI: Body mass index is 27.05 kg/m². Blood pressure 130/80, height 6' 1\" (1.854 m), weight 205 lb (93 kg). 5/27/2021    Description of clinical problem: Right upper extremity pain, paresthesias described over forearm and above level of elbow. Right lateral epicondylitis, carpal tunnel syndrome. Chief Complaint   Patient presents with    Procedure     EMG     Pain Yes   ; Numbness/tingling  Yes; Weakness  No       Brief physical exam:   Sensory deficit No; Weakness No; Atrophy  No; Reflex abnormality No  Limited neurologic exam performed of the right upper extremity shows grossly intact 5/5 power of hand  strength, finger abduction/adduction, thumb opposition, wrist flexion/extension, biceps/triceps, deltoids, shoulder shrug and normal motor tone. DTRs: 1+. Negative finger flexion and Claire's reflexes. Negative Tinel's test to percussion of her right wrist.  Sensory exam to light touch and sharp stick testing is reported is grossly intact subjectively. No fasciculations or focal muscular atrophy noted. Study Limitations: None. [facility]  Electrodiagnostic Laboratory  [facility]          Full Name: Sarah Schwarz Gender: Male  MRN: [id number] YOB: 1965      Visit Date: 5/27/2021 08:04  Age: 61 Years 1 Months Old  Examining Physician: Dr. Francisco Burch   Referring Physician: Dr. Kathy Donohue   Technician: Anni Jackson   Height: 6 feet 1 inch  Weight: 205 lbs  Notes: NumbnessTingling right hand      Motor NCS      Nerve / Sites Latency Amplitude Amp. 1-2 Distance Lat Diff Velocity Temp.    ms mV % cm ms m/s °C   R Median - APB Summary of Findings:   Nerve conduction studies:   · The following nerve conduction studies were abnormal:   · The right median combined sensory index is prolonged in latency. · The right median motor nerve conductions (recording from the abductor pollicis brevis muscle) is prolonged in distal latency with markedly reduced amplitudes and slowed motor nerve conduction velocity. · The right median F-wave is prolonged in latency. · All other nerve conduction studies listed in the table above were normal in latency, amplitude and conduction velocity. Needle EMG:   · Needle EMG was performed using a concentric needle. · The following abnormalities were seen on needle EMG: Enlarged motor unit potentials in duration and amplitude and decreased recruitment (loss of motor units) in primarily the right C5/6 and C7 myotomal distributions of mostly a moderately severe degree. · The right midcervical paraspinal muscle group shows increased insertional activity and 1+ positive sharp waves.  All other muscles tested, as listed in the table above demonstrated normal amplitude, duration, phases and recruitment and no active denervation signs were seen. Diagnostic Interpretation: This study was abnormal.   Electrodiagnosis: NCS/EMG examination performed of the right upper extremity shows electrodiagnostic evidence for the followin. A chronic right median mononeuropathy at the wrist (I.e., carpal tunnel syndrome) mild in degree electrically. 2.  A chronic right cervical radiculopathy (I.e., primarily C5-6, C7 myotomal distributions) with mild ongoing and chronic denervation of mostly a moderately severe degree. Clinical correlation is recommended.     Previous Study: None reported    Technologist:   Physician: Bashir Weldon MD    Nerve conduction studies and electromyography were performed according to our laboratory policies and procedures which can be provided upon request. All abnormal values are

## 2021-06-14 ENCOUNTER — OFFICE VISIT (OUTPATIENT)
Dept: ORTHOPEDIC SURGERY | Age: 56
End: 2021-06-14
Payer: COMMERCIAL

## 2021-06-14 VITALS — BODY MASS INDEX: 27.43 KG/M2 | RESPIRATION RATE: 18 BRPM | HEIGHT: 73 IN | WEIGHT: 207 LBS

## 2021-06-14 DIAGNOSIS — R20.2 NUMBNESS AND TINGLING IN RIGHT HAND: Primary | ICD-10-CM

## 2021-06-14 DIAGNOSIS — M77.8 LEFT ELBOW TENDONITIS: ICD-10-CM

## 2021-06-14 DIAGNOSIS — R20.0 NUMBNESS AND TINGLING IN RIGHT HAND: Primary | ICD-10-CM

## 2021-06-14 PROCEDURE — 99213 OFFICE O/P EST LOW 20 MIN: CPT | Performed by: ORTHOPAEDIC SURGERY

## 2021-06-14 PROCEDURE — 20551 NJX 1 TENDON ORIGIN/INSJ: CPT | Performed by: ORTHOPAEDIC SURGERY

## 2021-06-14 RX ORDER — BETAMETHASONE SODIUM PHOSPHATE AND BETAMETHASONE ACETATE 3; 3 MG/ML; MG/ML
6 INJECTION, SUSPENSION INTRA-ARTICULAR; INTRALESIONAL; INTRAMUSCULAR; SOFT TISSUE ONCE
Status: COMPLETED | OUTPATIENT
Start: 2021-06-14 | End: 2021-06-14

## 2021-06-14 RX ADMIN — BETAMETHASONE SODIUM PHOSPHATE AND BETAMETHASONE ACETATE 6 MG: 3; 3 INJECTION, SUSPENSION INTRA-ARTICULAR; INTRALESIONAL; INTRAMUSCULAR; SOFT TISSUE at 16:15

## 2021-06-14 NOTE — PATIENT INSTRUCTIONS
Patient Education        Tennis Elbow: Care Instructions  Overview     Tennis elbow is soreness or pain on the outer part of the elbow. The pain occurs when the tendon is stretched and becomes irritated by repeated twisting of the hand, wrist, and forearm. A tendon is a tough tissue that connects muscle to bone. This injury is common in tennis players. But you also can get it from many activities that work the same muscles. Examples include gardening, painting, and using tools. Tennis elbow usually heals with rest and treatment at home. Follow-up care is a key part of your treatment and safety. Be sure to make and go to all appointments, and call your doctor if you are having problems. It's also a good idea to know your test results and keep a list of the medicines you take. How can you care for yourself at home?    · Rest your fingers, wrist, and forearm. Try to stop or reduce any activity that causes elbow pain. You may have to rest your arm for weeks to months. Follow your doctor's directions for how long to rest.     · Put ice or a cold pack on your elbow for 10 to 20 minutes at a time. Try to do this every 1 to 2 hours for the next 3 days (when you are awake) or until the swelling goes down. Put a thin cloth between the ice and your skin. You can try heat, or alternating heat and ice, after the first 3 days.     · If your doctor gave you a brace or splint, use it as directed. A \"counterforce\" brace is a strap around your forearm, just below your elbow. It may ease the pressure on the tendon and spread force throughout your arm.     · Prop up your elbow on pillows to help reduce swelling.     · Follow your doctor's or physical therapist's directions for exercise.     · Return to your usual activities slowly.     · Try to prevent the problem. Learn the best techniques for your sport. For example, make sure the  on your tennis racquet is not too big for your hand.  Try not to hit a tennis ball late in your exercises  Wrist flexor stretch   1. Extend your arm in front of you with your palm up. 2. Bend your wrist, pointing your hand toward the floor. 3. With your other hand, gently bend your wrist farther until you feel a mild to moderate stretch in your forearm. 4. Hold for at least 15 to 30 seconds. Repeat 2 to 4 times. Wrist extensor stretch   1. Repeat steps 1 to 4 of the stretch above but begin with your extended hand palm down. Ball or sock squeeze   1. Hold a tennis ball (or a rolled-up sock) in your hand. 2. Make a fist around the ball (or sock) and squeeze. 3. Hold for about 6 seconds, and then relax for up to 10 seconds. 4. Repeat 8 to 12 times. 5. Switch the ball (or sock) to your other hand and do 8 to 12 times. Wrist deviation   1. Sit so that your arm is supported but your hand hangs off the edge of a flat surface, such as a table. 2. Hold your hand out like you are shaking hands with someone. 3. Move your hand up and down. 4. Repeat this motion 8 to 12 times. 5. Switch arms. 6. Try to do this exercise twice with each hand. Wrist curls   1. Place your forearm on a table with your hand hanging over the edge of the table, palm up. 2. Place a 1- to 2-pound weight in your hand. This may be a dumbbell, a can of food, or a filled water bottle. 3. Slowly raise and lower the weight while keeping your forearm on the table and your palm facing up. 4. Repeat this motion 8 to 12 times. 5. Switch arms, and do steps 1 through 4.  6. Repeat with your hand facing down toward the floor. Switch arms. Biceps curls   1. Sit leaning forward with your legs slightly spread and your left hand on your left thigh. 2. Place your right elbow on your right thigh, and hold the weight with your forearm horizontal.  3. Slowly curl the weight up and toward your chest.  4. Repeat this motion 8 to 12 times. 5. Switch arms, and do steps 1 through 4.     Follow-up care is a key part of your treatment and keep a list of the medicines you take. How can you care for yourself at home? · If possible, stop or reduce the activity that causes your symptoms. If you cannot stop the activity, take frequent breaks to rest and stretch or change hand positions to do a task. Try switching hands, such as when using a computer mouse. · Try to avoid bending or twisting your wrists. · Ask your doctor if you can take an over-the-counter pain medicine, such as acetaminophen (Tylenol), ibuprofen (Advil, Motrin), or naproxen (Aleve). Be safe with medicines. Read and follow all instructions on the label. · If your doctor prescribes corticosteroid medicine to help reduce pain and swelling, take it exactly as prescribed. Call your doctor if you think you are having a problem with your medicine. · Put ice or a cold pack on your wrist for 10 to 20 minutes at a time to ease pain. Put a thin cloth between the ice and your skin. · If your doctor or your physical or occupational therapist tells you to wear a wrist splint, wear it as directed to keep your wrist in a neutral position. This also eases pressure on your median nerve. · Ask your doctor whether you should have physical or occupational therapy to learn how to do tasks differently. · Try a yoga class to stretch your muscles and build strength in your hands and wrists. Yoga has been shown to ease carpal tunnel symptoms. To prevent carpal tunnel  · When working at a computer, keep your hands and wrists in line with your forearms. Hold your elbows close to your sides. Take a break every 10 to 15 minutes. · Try these exercises:  ? Warm up: Rotate your wrist up, down, and from side to side. Repeat this 4 times. Stretch your fingers far apart, relax them, then stretch them again. Repeat 4 times. Stretch your thumb by pulling it back gently, holding it, and then releasing it. Repeat 4 times. ? Prayer stretch: Start with your palms together in front of your chest just below your chin. Slowly lower your hands toward your waistline while keeping your hands close to your stomach and your palms together until you feel a mild to moderate stretch under your forearms. Hold for 10 to 20 seconds. Repeat 4 times. ? Wrist flexor stretch: Hold your arm in front of you with your palm up. Bend your wrist, pointing your hand toward the floor. With your other hand, gently bend your wrist further until you feel a mild to moderate stretch in your forearm. Hold for 10 to 20 seconds. Repeat 4 times. ? Wrist extensor stretch: Repeat the steps for the wrist flexor stretch, but begin with your extended hand palm down. · Squeeze a rubber exercise ball several times a day to keep your hands and fingers strong. · Avoid holding objects (such as a book) in one position for a long time. When possible, use your whole hand to grasp an object. Using just the thumb and index finger can put stress on the wrist.  · Do not smoke. It can make this condition worse by reducing blood flow to the median nerve. If you need help quitting, talk to your doctor about stop-smoking programs and medicines. These can increase your chances of quitting for good. When should you call for help? Watch closely for changes in your health, and be sure to contact your doctor if:    · Your pain or other problems do not get better with home care.     · You want more information about physical or occupational therapy.     · You have side effects of your corticosteroid medicine, such as:  ? Weight gain. ? Mood changes. ? Trouble sleeping. ? Bruising easily.     · You have any other problems with your medicine. Where can you learn more? Go to [email or web address] org and sign in to your [employer] account. Enter R432 in the [employer] box to learn more about \"Carpal Tunnel Syndrome: Care Instructions. \"     If you do not have an account, please click on the \"Sign Up Now\" link.   Current as of: November 16, 2020               Content Version: 12.8  © 2006-2021 Healthwise, [employer]. Care instructions adapted under license by [facility] ([facility]). If you have questions about a medical condition or this instruction, always ask your healthcare professional. [address] any warranty or liability for your use of this information. Patient Education        Carpal Tunnel Syndrome: Exercises  Introduction  Here are some examples of exercises for you to try. The exercises may be suggested for a condition or for rehabilitation. Start each exercise slowly. Ease off the exercises if you start to have pain. You will be told when to start these exercises and which ones will work best for you. Warm-up stretches  When you no longer have pain or numbness, you can do exercises to help prevent carpal tunnel syndrome from coming back. Do not do any stretch or movement that is uncomfortable or painful. 1. Rotate your wrist up, down, and from side to side. Repeat 4 times. 2. Stretch your fingers far apart. Relax them, and then stretch them again. Repeat 4 times. 3. Stretch your thumb by pulling it back gently, holding it, and then releasing it. Repeat 4 times. How to do the exercises  Prayer stretch   1. Start with your palms together in front of your chest just below your chin. 2. Slowly lower your hands toward your waistline, keeping your hands close to your stomach and your palms together until you feel a mild to moderate stretch under your forearms. 3. Hold for at least 15 to 30 seconds. Repeat 2 to 4 times. Wrist flexor stretch   1. Extend your arm in front of you with your palm up. 2. Bend your wrist, pointing your hand toward the floor. 3. With your other hand, gently bend your wrist farther until you feel a mild to moderate stretch in your forearm. 4. Hold for at least 15 to 30 seconds. Repeat 2 to 4 times. Wrist extensor stretch   1.  Repeat steps 1 through 4 of the stretch above, but begin with your extended hand palm down. Follow-up care is a key part of your treatment and safety. Be sure to make and go to all appointments, and call your doctor if you are having problems. It's also a good idea to know your test results and keep a list of the medicines you take. Where can you learn more? Go to https://chpepiceweb.[employer]. org and sign in to your [employer] account. Enter X032 in the [employer] box to learn more about \"Carpal Tunnel Syndrome: Exercises. \"     If you do not have an account, please click on the \"Sign Up Now\" link. Current as of: November 16, 2020               Content Version: 12.8  © 2006-2021 Healthwise, [employer]. Care instructions adapted under license by [employer]. If you have questions about a medical condition or this instruction, always ask your healthcare professional. [address] any warranty or liability for your use of this information.

## 2021-06-14 NOTE — PROGRESS NOTES
Chief Complaint   Patient presents with    Wrist Pain     F/U EMG TRAV Schwarz is a 61y.o. year old  who presents for follow up of bilateral upper extremity complaints. He has previously been treated for lateral epicondylitis on the left. He reports good results with injections in the past.  He states he was doing well with his left elbow pain. He states he recently put in a fence and has recurrence of his lateral elbow pain. No specific injury. He is now experiencing diffuse upper extremity pain radiating from his elbow into his shoulder as well as distally into his hand. Does complain of some numbness and tingling about the hand. Positive nocturnal symptoms. Patient had an EMG/NCS dated 5/27/21    Right median nerve motor latency: 5.10  Right ulnar nerve velocity: above 50  Right median nerve sensory latency: 3.54       EMG portion of the exam demonstrates changes to the right flexor pollicis longus, right biceps brachii, right triceps brachii, right deltoid. This is consistent with chronic right carpal tunnel syndrome mild in degree and chronic right cervical radiculopathy which is moderate to severe in degree.         Past Medical History:   Diagnosis Date    ADHD (attention deficit hyperactivity disorder)     Bilateral carotid artery stenosis 5/29/2019    CAD (coronary artery disease)     Carotid artery stenosis     COPD [facility]chronic obstructive pulmonary disease) [facility])     Erectile dysfunction     Esophageal erosions     Essential hypertension 5/29/2019    Folate deficiency     Gastroesophageal reflux disease without esophagitis 5/29/2019    HSV infection     Hyperlipidemia     Hypertension     Liver disease     fatty liver    Other hyperlipidemia 5/29/2019    Prostatitis     BRYCE (renal artery stenosis) ([facility])     right-stent    Right lateral epicondylitis 5/27/2021    Simple chronic bronchitis ([address] 5/29/2019    Vitamin B 12 deficiency      Past Surgical History: Procedure Laterality Date    BACK SURGERY      ruptured disc    CARDIAC CATHETERIZATION      CAROTID ENDARTERECTOMY  10/2018    KNEE ARTHROPLASTY      KNEE ARTHROSCOPY Left        Current Outpatient Medications:     aspirin 81 MG EC tablet, Take 81 mg by mouth daily, Disp: , Rfl:     Coenzyme Q10 (CO Q-10) 100 MG CAPS, Take 1 capsule by mouth daily, Disp: 90 capsule, Rfl: 1    simvastatin (ZOCOR) 20 MG tablet, Take 1 tablet by mouth nightly, Disp: 90 tablet, Rfl: 1    clonazePAM (KLONOPIN) 1 MG tablet, Take 1 tablet by mouth daily as needed for Anxiety for up to 30 days. , Disp: 30 tablet, Rfl: 1  No Known Allergies  Social History     Socioeconomic History    Marital status:      Spouse name: Not on file    Number of children: Not on file    Years of education: Not on file    Highest education level: Not on file   Occupational History    Not on file   Tobacco Use    Smoking status: Former Smoker     Packs/day: 1.50     Years: 37.00     Pack years: 55.50     Types: Cigars, Cigarettes     Quit date: 2020     Years since quittin.3    Smokeless tobacco: Never Used   Substance and Sexual Activity    Alcohol use: Yes     Alcohol/week: 3.0 standard drinks     Types: 3 Glasses of wine per week    Drug use: No    Sexual activity: Not on file   Other Topics Concern    Not on file   Social History Narrative    Not on file     Social Determinants of Health     Financial Resource Strain: Low Risk     Difficulty of Paying Living Expenses: Not hard at all   Food Insecurity: No Food Insecurity    Worried About Running Out of Food in the Last Year: Never true    [address] in the Last Year: Never true   Transportation Needs:     Lack of Transportation (Medical):      Lack of Transportation (Non-Medical):    Physical Activity:     Days of Exercise per Week:     Minutes of Exercise per Session:    Stress:     Feeling of Stress :    Social Connections:     Frequency of Communication epicondylitis  Right double crush syndrome with mild carpal tunnel and cervical radiculopathy C5-C6, C7  ADHD, CAD, COPD  GERD  Peripheral vascular disease    Plan:     Discussed findings with patient. Discussed conservative and surgical management with patient. Patient is happy with his response to his right carpal tunnel cortisone injection. He would like to repeat this as necessary. He is requesting cortisone injection to his left elbow at today's visit. He had a good response with his last injection a year ago. Patient consented and this was tolerated well. He is using his counterforce brace. Home exercises were provided to the patient. Patient may repeat injections every 3 months as needed. All questions answered. Procedure Note Cortisone Injection for Elbow Epicondylitis    The left lateral epicondyle was identified as the injection site. The risk and benefits of a cortisone injection were explained and the patient consented to the injection. Under sterile conditions, the epicondyle was injected with a mixture of 1 mL of 1% Lidocaine and 1 mL of 6 mg/mL Betamethasone without complication. A sterile bandage was applied. I have seen and evaluated the patient and agree with the above assessment and plan on today's visit. I have performed the key components of the history and physical examination with significant findings of lateral epicondylitis. Mild right carpal tunnel syndrome improved with cortisone injection. EMG and nerve conduction is reviewed with patient in detail. All questions answered. . I concur with the findings and plan as documented.     Suhail Arellano MD  6/14/2021

## 2021-06-23 ENCOUNTER — OFFICE VISIT (OUTPATIENT)
Dept: FAMILY MEDICINE CLINIC | Age: 56
End: 2021-06-23
Payer: COMMERCIAL

## 2021-06-23 VITALS
SYSTOLIC BLOOD PRESSURE: 128 MMHG | OXYGEN SATURATION: 97 % | HEART RATE: 68 BPM | DIASTOLIC BLOOD PRESSURE: 74 MMHG | BODY MASS INDEX: 26.93 KG/M2 | HEIGHT: 73 IN | TEMPERATURE: 97 F | WEIGHT: 203.2 LBS

## 2021-06-23 DIAGNOSIS — H61.21 IMPACTED CERUMEN OF RIGHT EAR: ICD-10-CM

## 2021-06-23 DIAGNOSIS — I10 ESSENTIAL HYPERTENSION: ICD-10-CM

## 2021-06-23 DIAGNOSIS — F41.9 ANXIETY: ICD-10-CM

## 2021-06-23 DIAGNOSIS — Z92.29 HISTORY OF REGULAR MEDICATION USE: ICD-10-CM

## 2021-06-23 DIAGNOSIS — E78.49 OTHER HYPERLIPIDEMIA: Primary | ICD-10-CM

## 2021-06-23 DIAGNOSIS — R73.9 HYPERGLYCEMIA: ICD-10-CM

## 2021-06-23 DIAGNOSIS — Z87.891 HX OF SMOKING: ICD-10-CM

## 2021-06-23 DIAGNOSIS — H69.83 DYSFUNCTION OF BOTH EUSTACHIAN TUBES: ICD-10-CM

## 2021-06-23 PROCEDURE — 69210 REMOVE IMPACTED EAR WAX UNI: CPT | Performed by: INTERNAL MEDICINE

## 2021-06-23 PROCEDURE — 99214 OFFICE O/P EST MOD 30 MIN: CPT | Performed by: INTERNAL MEDICINE

## 2021-06-23 RX ORDER — PREDNISONE 10 MG/1
TABLET ORAL
Qty: 12 TABLET | Refills: 0 | Status: SHIPPED | OUTPATIENT
Start: 2021-06-23 | End: 2021-06-29

## 2021-06-23 RX ORDER — FLUTICASONE PROPIONATE 50 MCG
2 SPRAY, SUSPENSION (ML) NASAL DAILY
Qty: 1 BOTTLE | Refills: 0 | Status: SHIPPED | OUTPATIENT
Start: 2021-06-23

## 2021-06-24 ENCOUNTER — TELEPHONE (OUTPATIENT)
Dept: FAMILY MEDICINE CLINIC | Age: 56
End: 2021-06-24

## 2021-06-24 NOTE — TELEPHONE ENCOUNTER
[facility] No answer and VM full. Needs to where he would like to go.

## 2021-06-24 NOTE — PROGRESS NOTES
[address] [facility]      21  Jose Anderson : 1965 Sex: male  Age: 61 y.o. Chief Complaint   Patient presents with    Hyperlipidemia    Anxiety       HPI  Patient presents today for short-term follow-up visit on his medical problems. Last month I saw him he was complaining of anxiety and stress which was causing him some nausea and gagging. We did start him on Klonopin and this is helped him significantly he states. He is feeling better, less anxious no further nausea his blood pressures been okay on no medication. His lipids have been good on statin medication. He is complaining today of your symptoms. States it feels like fluid is in them. He does get some popping and crackling sensation. There is no pain. We also discussed his former smoking history and the availability of the low-dose CT screening for lung cancer. He states he was interested in this and I told him we will set him up. He does continue to remain smoke-free for over a year now  He continues to follow with vascular Dr. Juan David Olivier for his carotid vascular disease Dr. Gege Barrett for his carpal tunnel syndrome and elbow tendinitis. .  No longer follows with urology for history of microscopic hematuria        Review of Systems     Constitutional: Negative for activity change, appetite change, chills, diaphoresis, fatigue, fever and unexpected weight change. HENT: Negative for ear pain. He does complain of eustachian tube dysfunction type symptoms however. Respiratory: Negative for cough shortness of breath and wheezing.     Cardiovascular: Negative for, chest pain, palpitations and leg swelling. Gastrointestinal: Negative for abdominal pain, blood in stool, constipation, diarrhea, nausea and vomiting.        GERD improved   Endocrine: Negative.    Genitourinary: Negative for difficulty urinating, dysuria,, frequency hematuria and urgency.    Musculoskeletal: Positive for arthralgias and  right arm pain .   Follows with orthopedics. Negative for back pain, gait problem and myalgias. Skin: Negative.    Allergic/Immunologic: Negative for environmental allergies and immunocompromised state. Neurological:Negative for dizziness, headache weakness, light-headedness and numbness.        Dizziness and headache resolved   Hematological: Negative.    Psychiatric/Behavioral: Positive for sleep disturbance. The patient is nervous/anxious.    We have tried SSRIs and he has failed.           REST OF PERTINENT ROS GONE OVER AND WAS NEGATIVE. Current Outpatient Medications:     predniSONE (DELTASONE) 10 MG tablet, Take 3 tablets by mouth daily for 2 days, THEN 2 tablets daily for 2 days, THEN 1 tablet daily for 2 days. , Disp: 12 tablet, Rfl: 0    fluticasone (FLONASE) 50 MCG/ACT nasal spray, 2 sprays by Each Nostril route daily, Disp: 1 Bottle, Rfl: 0    aspirin 81 MG EC tablet, Take 81 mg by mouth daily, Disp: , Rfl:     Coenzyme Q10 (CO Q-10) 100 MG CAPS, Take 1 capsule by mouth daily, Disp: 90 capsule, Rfl: 1    simvastatin (ZOCOR) 20 MG tablet, Take 1 tablet by mouth nightly, Disp: 90 tablet, Rfl: 1    clonazePAM (KLONOPIN) 1 MG tablet, Take 1 tablet by mouth daily as needed for Anxiety for up to 30 days. , Disp: 30 tablet, Rfl: 1  No Known Allergies    Past Medical History:   Diagnosis Date    ADHD (attention deficit hyperactivity disorder)     Bilateral carotid artery stenosis 5/29/2019    CAD (coronary artery disease)     Carotid artery stenosis     COPD (chronic obstructive pulmonary disease) (HCC)     Erectile dysfunction     Esophageal erosions     Essential hypertension 5/29/2019    Folate deficiency     Gastroesophageal reflux disease without esophagitis 5/29/2019    HSV infection     Hyperlipidemia     Hypertension     Liver disease     fatty liver    Other hyperlipidemia 5/29/2019    Prostatitis     BRYCE (renal artery stenosis) ([facility] [address]     right-stent    Right lateral epicondylitis 5/27/2021  Simple chronic bronchitis ([facility] 2019    Vitamin B 12 deficiency      Past Surgical History:   Procedure Laterality Date    BACK SURGERY      ruptured disc    CARDIAC CATHETERIZATION      CAROTID ENDARTERECTOMY  10/2018    KNEE ARTHROPLASTY      KNEE ARTHROSCOPY Left      Family History   Problem Relation Age of Onset    Diabetes Mother     Stroke Mother     Alcohol Abuse Father      Social History     Socioeconomic History    Marital status:      Spouse name: Not on file    Number of children: Not on file    Years of education: Not on file    Highest education level: Not on file   Occupational History    Not on file   Tobacco Use    Smoking status: Former Smoker     Packs/day: 1.50     Years: 37.00     Pack years: 55.50     Types: Cigars, Cigarettes     Quit date: 2020     Years since quittin.3    Smokeless tobacco: Never Used   Substance and Sexual Activity    Alcohol use: Yes     Alcohol/week: 3.0 standard drinks     Types: 3 Glasses of wine per week    Drug use: No    Sexual activity: Not on file   Other Topics Concern    Not on file   Social History Narrative    Not on file     Social Determinants of Health     Financial Resource Strain: Low Risk     Difficulty of Paying Living Expenses: Not hard at all   Food Insecurity: No Food Insecurity    Worried About Running Out of Food in the Last Year: Never true    [address] in the Last Year: Never true   Transportation Needs:     Lack of Transportation (Medical):      Lack of Transportation (Non-Medical):    Physical Activity:     Days of Exercise per Week:     Minutes of Exercise per Session:    Stress:     Feeling of Stress :    Social Connections:     Frequency of Communication with Friends and Family:     Frequency of Social Gatherings with Friends and Family:     Attends Orthodoxy Services:     Active Member of Clubs or Organizations:     Attends Club or Organization Meetings:     Marital Status: Intimate Partner Violence:     Fear of Current or Ex-Partner:     Emotionally Abused:     Physically Abused:     Sexually Abused:        Vitals:    06/23/21 1055   BP: 128/74   Pulse: 68   Temp: 97 °F (36.1 °C)   TempSrc: Temporal   SpO2: 97%   Weight: 203 lb 3.2 oz (92.2 kg)   Height: 6' 1\" (1.854 m)       Physical Exam    Const: Appears well developed and well nourished. No signs of acute distress present. HEENT: Could not visualize right TM secondary to cerumen left side looked okay. Mouth no lesions throat not injected     Neck: Supple and symmetric. Palpation reveals no adenopathy. No masses appreciated. Thyroid  exhibits no nodule or thyromegaly. No JVD. Carotids: 2+ and equal bilaterally, without bruits/right  carotid endarterectomy scar noted  Resp: No rales, rhonchi or wheezes appreciated over the lungs bilaterally/slightly prolonged expiratory  phase. CV: Rate is regular. Rhythm is regular. S1 is normal. S2 is normal. No gallop or rubs. No heart.   murmur appreciated.    extremities: No clubbing, cyanosis or edema. Abdomen: Bowel sounds are normoactive. Palpation reveals softness, with no distension,  organomegaly or tenderness. No abdominal masses palpable. No palpable hepatosplenomegaly. Musculo: Walks with a normal gait. Upper Extremities: Full ROM bilaterally.   lower Extremities:  Full ROM bilaterally.  Good pulses. Skin: Dry and warm with no rash. Neuro: Alert and oriented x3. Mood is normal. Affect is normal. Speech is articulate and fluent. Upper Extremities: motor strength is intact. Normal muscle tone bilaterally. Lower Extremities: motor  strength is intact. Normal muscle tone bilaterally. Sensation intact to light touch. Reflexes: DTR's are  symmetric, intact and 2+ bilaterally. Cranial Nerves: Cranial nerves II-XII intact. Psych: Patient's attitude is cooperative. Patient's affect is less anxious . judgement is realistic.  Insight is  appropriate.            Assessment and Plan: Jane Watts was seen today for hyperlipidemia and anxiety. Diagnoses and all orders for this visit:    Other hyperlipidemia  Stable on statin medication    Essential hypertension  Stable on no medication currently    Anxiety  Improved on Klonopin that was recently started. History of regular medication use    Hyperglycemia  Stable with prediabetic range sugars.-Continue to monitor    Impacted cerumen of right ear  -     43058 - SD REMOVE IMPACTED EAR WAX    Dysfunction of both eustachian tubes  Prednisone fluticasone nasal spray    Other orders  -     predniSONE (DELTASONE) 10 MG tablet; Take 3 tablets by mouth daily for 2 days, THEN 2 tablets daily for 2 days, THEN 1 tablet daily for 2 days. -     fluticasone (FLONASE) 50 MCG/ACT nasal spray; 2 sprays by Each Nostril route daily    Plan: Right ear flush prednisone taper dose fluticasone nasal spray. He is to call in 2 weeks if not improved will refer to ear nose and throat. Low-dose CAT scan of the chest with a smoking history. Reviewed Dr. Munir Hicks note. I reviewed EMG nerve conduction testing done. Follow with above consultants. Patient states he is going to try to wean his Klonopin in the near future does not wish to stay on it for fear of addiction. Monitor blood pressure. Notify us of problems in the interim. Return in about 4 months (around 10/23/2021). Seen By:  Arminda Watson MD      *Document was created using voice recognition software. Note was reviewed however may contain grammatical errors.

## 2021-06-28 NOTE — TELEPHONE ENCOUNTER
Called and spoke with the patient. He is going to have it done at [facility]. Order will be faxed over once I get doctors signature.

## 2021-07-16 ENCOUNTER — TELEPHONE (OUTPATIENT)
Dept: FAMILY MEDICINE CLINIC | Age: 56
End: 2021-07-16

## 2021-07-19 NOTE — TELEPHONE ENCOUNTER
Letter sent to patient letting him know his CT came back okay. Attached a copy of the results for his records.

## 2021-08-11 ENCOUNTER — OFFICE VISIT (OUTPATIENT)
Dept: FAMILY MEDICINE CLINIC | Age: 56
End: 2021-08-11
Payer: COMMERCIAL

## 2021-08-11 VITALS
DIASTOLIC BLOOD PRESSURE: 74 MMHG | TEMPERATURE: 98.6 F | BODY MASS INDEX: 26.39 KG/M2 | OXYGEN SATURATION: 98 % | SYSTOLIC BLOOD PRESSURE: 136 MMHG | WEIGHT: 200 LBS | HEART RATE: 77 BPM

## 2021-08-11 DIAGNOSIS — H61.23 BILATERAL IMPACTED CERUMEN: Primary | ICD-10-CM

## 2021-08-11 DIAGNOSIS — H92.03 OTALGIA, BILATERAL: ICD-10-CM

## 2021-08-11 PROCEDURE — 69210 REMOVE IMPACTED EAR WAX UNI: CPT | Performed by: FAMILY MEDICINE

## 2021-08-11 PROCEDURE — 99213 OFFICE O/P EST LOW 20 MIN: CPT | Performed by: FAMILY MEDICINE

## 2021-08-11 RX ORDER — PSEUDOEPHEDRINE HCL 120 MG/1
120 TABLET, FILM COATED, EXTENDED RELEASE ORAL EVERY 12 HOURS
Qty: 30 TABLET | Refills: 0 | Status: SHIPPED | OUTPATIENT
Start: 2021-08-11 | End: 2021-08-26

## 2021-08-11 NOTE — PROGRESS NOTES
Socioeconomic History    Marital status:      Spouse name: Not on file    Number of children: Not on file    Years of education: Not on file    Highest education level: Not on file   Occupational History    Not on file   Tobacco Use    Smoking status: Former Smoker     Packs/day: 1.50     Years: 37.00     Pack years: 55.50     Types: Cigars, Cigarettes     Quit date: 2020     Years since quittin.4    Smokeless tobacco: Never Used   Substance and Sexual Activity    Alcohol use: Yes     Alcohol/week: 3.0 standard drinks     Types: 3 Glasses of wine per week    Drug use: No    Sexual activity: Not on file   Other Topics Concern    Not on file   Social History Narrative    Not on file     Social Determinants of Health     Financial Resource Strain: Low Risk     Difficulty of Paying Living Expenses: Not hard at all   Food Insecurity: No Food Insecurity    Worried About Running Out of Food in the Last Year: Never true    [address] in the Last Year: Never true   Transportation Needs:     Lack of Transportation (Medical):  Lack of Transportation (Non-Medical):    Physical Activity:     Days of Exercise per Week:     Minutes of Exercise per Session:    Stress:     Feeling of Stress :    Social Connections:     Frequency of Communication with Friends and Family:     Frequency of Social Gatherings with Friends and Family:     Attends Latter-day Services:     Active Member of Clubs or Organizations:     Attends Club or Organization Meetings:     Marital Status:    Intimate Partner Violence:     Fear of Current or Ex-Partner:     Emotionally Abused:     Physically Abused:     Sexually Abused:      Family History   Problem Relation Age of Onset    Diabetes Mother     Stroke Mother     Alcohol Abuse Father       There are no preventive care reminders to display for this patient. There are no preventive care reminders to display for this patient.    Diabetes Management Topic Date Due    A1C test (Diabetic or Prediabetic)  03/06/2021      Health Maintenance Due   Topic    DTaP/Tdap/Td vaccine (1 - Tdap)    Shingles Vaccine (1 of 2)      Health Maintenance   Topic Date Due    Hepatitis C screen  Never done    Pneumococcal 0-64 years Vaccine (1 of 2 - PPSV23) Never done    COVID-19 Vaccine (1) Never done    HIV screen  Never done    DTaP/Tdap/Td vaccine (1 - Tdap) Never done    Shingles Vaccine (1 of 2) Never done    A1C test (Diabetic or Prediabetic)  03/06/2021    Flu vaccine (1) 09/01/2021    Lipid screen  05/21/2022    Potassium monitoring  05/21/2022    Creatinine monitoring  05/21/2022    Low dose CT lung screening  07/16/2022    Colon cancer screen colonoscopy  12/22/2026    Hepatitis A vaccine  Aged Out    Hepatitis B vaccine  Aged Out    Hib vaccine  Aged Out    Meningococcal (ACWY) vaccine  Aged Out      There are no preventive care reminders to display for this patient. There are no preventive care reminders to display for this patient. /74   Pulse 77   Temp 98.6 °F (37 °C)   Wt 200 lb (90.7 kg)   SpO2 98%   BMI 26.39 kg/m²     Objective   Physical Exam  Vitals reviewed. Constitutional:       Appearance: Normal appearance. HENT:      Head: Normocephalic and atraumatic. Right Ear: There is impacted cerumen. Left Ear: There is impacted cerumen. Ears:      Comments: Impacted cerumen removed bilaterally by nursing staff without difficulty. Mouth/Throat:      Mouth: Mucous membranes are moist.   Eyes:      Extraocular Movements: Extraocular movements intact. Pupils: Pupils are equal, round, and reactive to light. Cardiovascular:      Rate and Rhythm: Normal rate. Pulmonary:      Effort: Pulmonary effort is normal.   Musculoskeletal:         General: Normal range of motion. Lymphadenopathy:      Cervical: No cervical adenopathy. Skin:     General: Skin is warm and dry.    Neurological:      Mental Status:

## 2021-08-13 ASSESSMENT — ENCOUNTER SYMPTOMS
RHINORRHEA: 1
SORE THROAT: 0
SINUS PAIN: 1
SINUS PRESSURE: 1

## 2021-08-26 ENCOUNTER — TELEPHONE (OUTPATIENT)
Dept: FAMILY MEDICINE CLINIC | Age: 56
End: 2021-08-26

## 2021-08-26 NOTE — TELEPHONE ENCOUNTER
----- Message from Pelon Panda sent at 8/26/2021  1:22 PM EDT -----  Subject: Message to Provider    QUESTIONS  Information for Provider? Pt needs to locate another ENT since his ear is   hurting him very bad. He has an appt scheduled for Sept 23rd & he wants to   get in sooner. ---------------------------------------------------------------------------  --------------  [employer] INFO  What is the best way for the office to contact you? OK to leave message on   voicemail  Preferred Call Back Phone Number?  [phone]  ---------------------------------------------------------------------------  --------------  SCRIPT ANSWERS  undefined

## 2021-08-27 ENCOUNTER — OFFICE VISIT (OUTPATIENT)
Dept: FAMILY MEDICINE CLINIC | Age: 56
End: 2021-08-27
Payer: COMMERCIAL

## 2021-08-27 VITALS
DIASTOLIC BLOOD PRESSURE: 78 MMHG | HEIGHT: 73 IN | WEIGHT: 200 LBS | TEMPERATURE: 98.1 F | SYSTOLIC BLOOD PRESSURE: 134 MMHG | OXYGEN SATURATION: 95 % | HEART RATE: 78 BPM | BODY MASS INDEX: 26.51 KG/M2

## 2021-08-27 DIAGNOSIS — H92.01 RIGHT EAR PAIN: Primary | ICD-10-CM

## 2021-08-27 DIAGNOSIS — I10 ESSENTIAL HYPERTENSION: ICD-10-CM

## 2021-08-27 PROCEDURE — 99213 OFFICE O/P EST LOW 20 MIN: CPT | Performed by: INTERNAL MEDICINE

## 2021-08-27 RX ORDER — PREDNISONE 10 MG/1
TABLET ORAL
Qty: 12 TABLET | Refills: 0 | Status: SHIPPED | OUTPATIENT
Start: 2021-08-27 | End: 2021-09-02

## 2021-08-27 RX ORDER — AMOXICILLIN AND CLAVULANATE POTASSIUM 875; 125 MG/1; MG/1
1 TABLET, FILM COATED ORAL 2 TIMES DAILY
Qty: 20 TABLET | Refills: 0 | Status: SHIPPED | OUTPATIENT
Start: 2021-08-27 | End: 2021-09-06

## 2021-08-27 NOTE — TELEPHONE ENCOUNTER
Contacted Dr Artie Musa office, they are scheduling out to Sept 21st. Left voicemail for Dr Brit Castorena office to return my call

## 2021-08-27 NOTE — TELEPHONE ENCOUNTER
Called Dr Mady Dumont office, they are scheduling into October. Is there another office you would like me to try?

## 2021-08-28 ASSESSMENT — ENCOUNTER SYMPTOMS
ABDOMINAL PAIN: 0
SINUS PRESSURE: 0
CHEST TIGHTNESS: 0
WHEEZING: 0
COUGH: 0
EYES NEGATIVE: 1
SINUS PAIN: 0
SORE THROAT: 0
SHORTNESS OF BREATH: 0

## 2021-08-29 NOTE — PROGRESS NOTES
[address] [facility] [address] [facility]     21  Cyndi Malin : 1965 Sex: male  Age: 61 y.o. Chief Complaint   Patient presents with   Parviniah Oralta     right ear pain; started back at the end of  and hasnt gotten any better; still really itchy and the pain is going down into his neck and making his neck go numb       HPI  Presents for additional visit today complaining of right ear pain. Looked back to my last note a couple months ago he was having severe symptoms at that time popping and crackling without pain I treated him and ask him to call me if symptoms persisted. He never did call back but showed up in [facility] recently where he was seen and ears flushed. He was placed on Sudafed and antibiotic drops. He has been using this and still complaining of pain he was referred to ear nose and throat but they cannot see him for about a month. He states his right neck swelling gets intermittent numbness to his right neck. Is not complaining of hearing loss at this time. Still gets occasional popping. Denies sore throat. Denies fever or chills. Denies cough. Does get some sinus drainage. Review of Systems   Constitutional: Negative for chills and fever. HENT: Positive for ear pain and postnasal drip. Negative for congestion, sinus pressure, sinus pain and sore throat. Eyes: Negative. Respiratory: Negative for cough, chest tightness, shortness of breath and wheezing. Cardiovascular: Negative for chest pain. Gastrointestinal: Negative for abdominal pain. Neurological: Positive for numbness. Intermittent right neck              REST OF PERTINENT ROS GONE OVER AND WAS NEGATIVE.                Current Outpatient Medications:     amoxicillin-clavulanate (AUGMENTIN) 875-125 MG per tablet, Take 1 tablet by mouth 2 times daily for 10 days, Disp: 20 tablet, Rfl: 0    predniSONE (DELTASONE) 10 MG tablet, Take 3 tablets by mouth daily for 2 days, THEN 2 tablets daily for 2 days, THEN 1 tablet daily for 2 days. , Disp: 12 tablet, Rfl: 0    neomycin-polymyxin-hydrocortisone (CORTISPORIN) 3.5-85292-4 otic solution, Place 4 drops into both ears 3 times daily, Disp: 1 Bottle, Rfl: 0    fluticasone (FLONASE) 50 MCG/ACT nasal spray, 2 sprays by Each Nostril route daily, Disp: 1 Bottle, Rfl: 0    aspirin 81 MG EC tablet, Take 81 mg by mouth daily, Disp: , Rfl:     Coenzyme Q10 (CO Q-10) 100 MG CAPS, Take 1 capsule by mouth daily, Disp: 90 capsule, Rfl: 1    simvastatin (ZOCOR) 20 MG tablet, Take 1 tablet by mouth nightly, Disp: 90 tablet, Rfl: 1    clonazePAM (KLONOPIN) 1 MG tablet, Take 1 tablet by mouth daily as needed for Anxiety for up to 30 days. , Disp: 30 tablet, Rfl: 1  No Known Allergies    Past Medical History:   Diagnosis Date    ADHD (attention deficit hyperactivity disorder)     Bilateral carotid artery stenosis 5/29/2019    CAD (coronary artery disease)     Carotid artery stenosis     COPD (chronic obstructive pulmonary disease) (HCC)     Erectile dysfunction     Esophageal erosions     Essential hypertension 5/29/2019    Folate deficiency     Gastroesophageal reflux disease without esophagitis 5/29/2019    HSV infection     Hyperlipidemia     Hypertension     Liver disease     fatty liver    Other hyperlipidemia 5/29/2019    Prostatitis     BRYCE (renal artery stenosis) ([address]     right-stent    Right lateral epicondylitis 5/27/2021    Simple chronic bronchitis ([facility] [address] 5/29/2019    Vitamin B 12 deficiency      Past Surgical History:   Procedure Laterality Date    BACK SURGERY      ruptured disc    CARDIAC CATHETERIZATION      CAROTID ENDARTERECTOMY  10/2018    KNEE ARTHROPLASTY      KNEE ARTHROSCOPY Left      Family History   Problem Relation Age of Onset    Diabetes Mother     Stroke Mother     Alcohol Abuse Father      Social History     Socioeconomic History    Marital status:      Spouse name: Not on file    Number of children: Not on file    Years of education: Not on file    Highest education level: Not on file   Occupational History    Not on file   Tobacco Use    Smoking status: Former Smoker     Packs/day: 1.50     Years: 37.00     Pack years: 55.50     Types: Cigars, Cigarettes     Quit date: 2020     Years since quittin.5    Smokeless tobacco: Never Used   Substance and Sexual Activity    Alcohol use: Yes     Alcohol/week: 3.0 standard drinks     Types: 3 Glasses of wine per week    Drug use: No    Sexual activity: Not on file   Other Topics Concern    Not on file   Social History Narrative    Not on file     Social Determinants of Health     Financial Resource Strain: Low Risk     Difficulty of Paying Living Expenses: Not hard at all   Food Insecurity: No Food Insecurity    Worried About Running Out of Food in the Last Year: Never true    [address] in the Last Year: Never true   Transportation Needs:     Lack of Transportation (Medical):  Lack of Transportation (Non-Medical):    Physical Activity:     Days of Exercise per Week:     Minutes of Exercise per Session:    Stress:     Feeling of Stress :    Social Connections:     Frequency of Communication with Friends and Family:     Frequency of Social Gatherings with Friends and Family:     Attends Adventist Services:     Active Member of Clubs or Organizations:     Attends Club or Organization Meetings:     Marital Status:    Intimate Partner Violence:     Fear of Current or Ex-Partner:     Emotionally Abused:     Physically Abused:     Sexually Abused:        Vitals:    21 1142   BP: 134/78   Pulse: 78   Temp: 98.1 °F (36.7 °C)   TempSrc: Temporal   SpO2: 95%   Weight: 200 lb (90.7 kg)   Height: 6' 1\" (1.854 m)       Physical Exam  Vitals and nursing note reviewed. Constitutional:       General: He is not in acute distress. Appearance: He is well-developed. HENT:      Head: Normocephalic and atraumatic.       Right Ear: Ear canal and external ear normal.      Left Ear: Tympanic membrane, ear canal and external ear normal.      Ears:      Comments: Right TM slightly erythematous     Nose: Nose normal.      Mouth/Throat:      Mouth: Mucous membranes are moist.      Pharynx: Oropharynx is clear. No oropharyngeal exudate or posterior oropharyngeal erythema. Neck:      Vascular: No carotid bruit. Comments: I cannot elicit tenderness to palpation to the right neck nor could I feel any fullness that he was describing. He was not having paresthesia at this time. Cardiovascular:      Rate and Rhythm: Normal rate and regular rhythm. Heart sounds: Normal heart sounds. No murmur heard. Pulmonary:      Effort: Pulmonary effort is normal. No respiratory distress. Breath sounds: Normal breath sounds. No wheezing, rhonchi or rales. Musculoskeletal:      Cervical back: Normal range of motion and neck supple. No tenderness. Lymphadenopathy:      Cervical: No cervical adenopathy. Skin:     General: Skin is warm and dry. Findings: No rash. Neurological:      General: No focal deficit present. Mental Status: He is alert and oriented to person, place, and time. Psychiatric:         Mood and Affect: Mood normal.         Behavior: Behavior normal.         Thought Content: Thought content normal.         Judgment: Judgment normal.                 Assessment and Plan:  Ursula Huerta was seen today for otalgia. Diagnoses and all orders for this visit:    Right ear pain    Essential hypertension    Other orders  -     amoxicillin-clavulanate (AUGMENTIN) 875-125 MG per tablet; Take 1 tablet by mouth 2 times daily for 10 days  -     predniSONE (DELTASONE) 10 MG tablet; Take 3 tablets by mouth daily for 2 days, THEN 2 tablets daily for 2 days, THEN 1 tablet daily for 2 days. Plan: I will place is put patient on prednisone and Augmentin as a trial for his symptoms. Follow-up with ear nose and throat. Continue on his eardrops.   He is to keep his next regular appointment next month. Call me in the next week if not improving. Look over 6/21 note when I see patient next    Return for keep appt. Seen By:  Qing Toth MD      *Document was created using voice recognition software. Note was reviewed however may contain grammatical errors.

## 2021-09-15 ENCOUNTER — TELEPHONE (OUTPATIENT)
Dept: FAMILY MEDICINE CLINIC | Age: 56
End: 2021-09-15

## 2021-09-15 NOTE — TELEPHONE ENCOUNTER
Pt Suzan Santana called this morning stated that he came in the office not to long ago about an ear infection, and pt stated that he is still having ear pain and he is experiencing numbness from the ear down to his neck. Pt also stated that he is have dizziness. Pt was wondering what he should do? Pt wanted to know if Dr. Rowe Habermann wanted to see him in the office or send more medication? Please advise.

## 2021-09-17 ENCOUNTER — OFFICE VISIT (OUTPATIENT)
Dept: ORTHOPEDIC SURGERY | Age: 56
End: 2021-09-17
Payer: COMMERCIAL

## 2021-09-17 VITALS — HEIGHT: 73 IN | RESPIRATION RATE: 18 BRPM | BODY MASS INDEX: 26.51 KG/M2 | WEIGHT: 200 LBS

## 2021-09-17 DIAGNOSIS — M25.521 RIGHT ELBOW PAIN: ICD-10-CM

## 2021-09-17 DIAGNOSIS — G56.01 RIGHT CARPAL TUNNEL SYNDROME: ICD-10-CM

## 2021-09-17 DIAGNOSIS — M77.11 RIGHT LATERAL EPICONDYLITIS: Primary | ICD-10-CM

## 2021-09-17 PROCEDURE — 20526 THER INJECTION CARP TUNNEL: CPT | Performed by: PHYSICIAN ASSISTANT

## 2021-09-17 PROCEDURE — 99214 OFFICE O/P EST MOD 30 MIN: CPT | Performed by: PHYSICIAN ASSISTANT

## 2021-09-17 PROCEDURE — 20550 NJX 1 TENDON SHEATH/LIGAMENT: CPT | Performed by: PHYSICIAN ASSISTANT

## 2021-09-17 RX ORDER — BETAMETHASONE SODIUM PHOSPHATE AND BETAMETHASONE ACETATE 3; 3 MG/ML; MG/ML
12 INJECTION, SUSPENSION INTRA-ARTICULAR; INTRALESIONAL; INTRAMUSCULAR; SOFT TISSUE ONCE
Status: DISCONTINUED | OUTPATIENT
Start: 2021-09-17 | End: 2021-12-29 | Stop reason: ALTCHOICE

## 2021-09-17 NOTE — PATIENT INSTRUCTIONS
Patient Education        Tennis Elbow: Care Instructions  Overview     Tennis elbow is soreness or pain on the outer part of the elbow. The pain occurs when the tendon is stretched and becomes irritated by repeated twisting of the hand, wrist, and forearm. A tendon is a tough tissue that connects muscle to bone. This injury is common in tennis players. But you also can get it from many activities that work the same muscles. Examples include gardening, painting, and using tools. Tennis elbow usually heals with rest and treatment at home. Follow-up care is a key part of your treatment and safety. Be sure to make and go to all appointments, and call your doctor if you are having problems. It's also a good idea to know your test results and keep a list of the medicines you take. How can you care for yourself at home?    · Rest your fingers, wrist, and forearm. Try to stop or reduce any activity that causes elbow pain. You may have to rest your arm for weeks to months. Follow your doctor's directions for how long to rest.     · Put ice or a cold pack on your elbow for 10 to 20 minutes at a time. Try to do this every 1 to 2 hours for the next 3 days (when you are awake) or until the swelling goes down. Put a thin cloth between the ice and your skin. You can try heat, or alternating heat and ice, after the first 3 days.     · If your doctor gave you a brace or splint, use it as directed. A \"counterforce\" brace is a strap around your forearm, just below your elbow. It may ease the pressure on the tendon and spread force throughout your arm.     · Prop up your elbow on pillows to help reduce swelling.     · Follow your doctor's or physical therapist's directions for exercise.     · Return to your usual activities slowly.     · Try to prevent the problem. Learn the best techniques for your sport. For example, make sure the  on your tennis racquet is not too big for your hand.  Try not to hit a tennis ball late in your swing.     · If you work, consider asking your employer about new ways of doing your job if your elbow pain is caused by something you do at work. Medicines    · Be safe with medicines. Read and follow all instructions on the label. ? If the doctor gave you a prescription medicine for pain, take it as prescribed. ? If you are not taking a prescription pain medicine, ask your doctor if you can take an over-the-counter medicine. When should you call for help? Call your doctor now or seek immediate medical care if:    · Your pain is worse.     · You cannot bend your elbow normally.     · Your arm or hand is cool or pale or changes color.     · You have tingling, weakness, or numbness in your hand and fingers. Watch closely for changes in your health, and be sure to contact your doctor if:    · You have work problems caused by your elbow pain.     · Your pain is not better after 2 weeks. Where can you learn more? Go to https://[employer]. org and sign in to your [employer] account. Enter 0699 [id number] in the Kidlandia box to learn more about \"Tennis Elbow: Care Instructions. \"     If you do not have an account, please click on the \"Sign Up Now\" link. Current as of: November 16, 2020               Content Version: 12.9  © 2006-2021 Healthwise, Incorporated. Care instructions adapted under license by [facility] ([facility]). If you have questions about a medical condition or this instruction, always ask your healthcare professional. [place] [address] any warranty or liability for your use of this information. Patient Education        Tennis Elbow: Exercises  Introduction  Here are some examples of exercises for you to try. The exercises may be suggested for a condition or for rehabilitation. Start each exercise slowly. Ease off the exercises if you start to have pain. You will be told when to start these exercises and which ones will work best for you.   How to do the exercises  Wrist flexor stretch   1. Extend your arm in front of you with your palm up. 2. Bend your wrist, pointing your hand toward the floor. 3. With your other hand, gently bend your wrist farther until you feel a mild to moderate stretch in your forearm. 4. Hold for at least 15 to 30 seconds. Repeat 2 to 4 times. Wrist extensor stretch   1. Repeat steps 1 to 4 of the stretch above but begin with your extended hand palm down. Ball or sock squeeze   1. Hold a tennis ball (or a rolled-up sock) in your hand. 2. Make a fist around the ball (or sock) and squeeze. 3. Hold for about 6 seconds, and then relax for up to 10 seconds. 4. Repeat 8 to 12 times. 5. Switch the ball (or sock) to your other hand and do 8 to 12 times. Wrist deviation   1. Sit so that your arm is supported but your hand hangs off the edge of a flat surface, such as a table. 2. Hold your hand out like you are shaking hands with someone. 3. Move your hand up and down. 4. Repeat this motion 8 to 12 times. 5. Switch arms. 6. Try to do this exercise twice with each hand. Wrist curls   1. Place your forearm on a table with your hand hanging over the edge of the table, palm up. 2. Place a 1- to 2-pound weight in your hand. This may be a dumbbell, a can of food, or a filled water bottle. 3. Slowly raise and lower the weight while keeping your forearm on the table and your palm facing up. 4. Repeat this motion 8 to 12 times. 5. Switch arms, and do steps 1 through 4.  6. Repeat with your hand facing down toward the floor. Switch arms. Biceps curls   1. Sit leaning forward with your legs slightly spread and your left hand on your left thigh. 2. Place your right elbow on your right thigh, and hold the weight with your forearm horizontal.  3. Slowly curl the weight up and toward your chest.  4. Repeat this motion 8 to 12 times. 5. Switch arms, and do steps 1 through 4.     Follow-up care is a key part of your treatment and safety. Be sure to make and go to all appointments, and call your doctor if you are having problems. It's also a good idea to know your test results and keep a list of the medicines you take. Where can you learn more? Go to https://chpemateuszeb.[employer]. org and sign in to your [employer] account. Enter E614 in the nGAP box to learn more about \"Tennis Elbow: Exercises. \"     If you do not have an account, please click on the \"Sign Up Now\" link. Current as of: November 16, 2020               Content Version: 12.9  © 2006-2021 Healthwise, Incorporated. Care instructions adapted under license by [facility] ([facility]). If you have questions about a medical condition or this instruction, always ask your healthcare professional. [address] any warranty or liability for your use of this information.

## 2021-09-17 NOTE — PROGRESS NOTES
Chief Complaint   Patient presents with    Elbow Pain     right elbow pain last injection 7/2020    Carpal Tunnel     Rt CTS, last injection 5/13/21         Shakeel Hernandez is a 61y.o. year old  who presents for follow up of right upper extremity pain. He has previously been treated for lateral epicondylitis bilaterally. He reports good results with injections in the past.    He is now experiencing diffuse upper extremity pain radiating from his elbow into his shoulder as well as distally into his hand. Does complain of some numbness and tingling about the hand. Positive nocturnal symptoms. Patient denies any injury. He does report previously he had a right carpal tunnel cortisone injection. He states this did provide him some relief. He reports his pain to his elbow is on the lateral aspect. This is worse with any use or activity. Pain is better with rest.  Patient is requesting cortisone injections to the right elbow and wrist at today's visit. Patient had an EMG/NCS dated 5/27/21    Right median nerve motor latency: 5.10  Right ulnar nerve velocity: above 50  Right median nerve sensory latency: 3.54       EMG portion of the exam demonstrates changes to the right flexor pollicis longus, right biceps brachii, right triceps brachii, right deltoid. This is consistent with chronic right carpal tunnel syndrome mild in degree and chronic right cervical radiculopathy which is moderate to severe in degree.         Past Medical History:   Diagnosis Date    ADHD (attention deficit hyperactivity disorder)     Bilateral carotid artery stenosis 5/29/2019    CAD (coronary artery disease)     Carotid artery stenosis     COPD (chronic obstructive pulmonary disease) (HCC)     Erectile dysfunction     Esophageal erosions     Essential hypertension 5/29/2019    Folate deficiency     Gastroesophageal reflux disease without esophagitis 5/29/2019    HSV infection     Hyperlipidemia     Hypertension     Liver disease     fatty liver    Other hyperlipidemia 2019    Prostatitis     BRYCE (renal artery stenosis) (HCC)     right-stent    Right lateral epicondylitis 2021    Simple chronic bronchitis ([address] 2019    Vitamin B 12 deficiency      Past Surgical History:   Procedure Laterality Date    BACK SURGERY      ruptured disc    CARDIAC CATHETERIZATION      CAROTID ENDARTERECTOMY  10/2018    KNEE ARTHROPLASTY      KNEE ARTHROSCOPY Left        Current Outpatient Medications:     neomycin-polymyxin-hydrocortisone (CORTISPORIN) 3.5-74562-7 otic solution, Place 4 drops into both ears 3 times daily, Disp: 1 Bottle, Rfl: 0    fluticasone (FLONASE) 50 MCG/ACT nasal spray, 2 sprays by Each Nostril route daily, Disp: 1 Bottle, Rfl: 0    aspirin 81 MG EC tablet, Take 81 mg by mouth daily, Disp: , Rfl:     Coenzyme Q10 (CO Q-10) 100 MG CAPS, Take 1 capsule by mouth daily, Disp: 90 capsule, Rfl: 1    simvastatin (ZOCOR) 20 MG tablet, Take 1 tablet by mouth nightly, Disp: 90 tablet, Rfl: 1    clonazePAM (KLONOPIN) 1 MG tablet, Take 1 tablet by mouth daily as needed for Anxiety for up to 30 days. , Disp: 30 tablet, Rfl: 1  No Known Allergies  Social History     Socioeconomic History    Marital status:      Spouse name: Not on file    Number of children: Not on file    Years of education: Not on file    Highest education level: Not on file   Occupational History    Not on file   Tobacco Use    Smoking status: Former Smoker     Packs/day: 1.50     Years: 37.00     Pack years: 55.50     Types: Cigars, Cigarettes     Quit date: 2020     Years since quittin.5    Smokeless tobacco: Never Used   Substance and Sexual Activity    Alcohol use:  Yes     Alcohol/week: 3.0 standard drinks     Types: 3 Glasses of wine per week    Drug use: No    Sexual activity: Not on file   Other Topics Concern    Not on file   Social History Narrative    Not on file     Social Determinants of Health Financial Resource Strain: Low Risk     Difficulty of Paying Living Expenses: Not hard at all   Food Insecurity: No Food Insecurity    Worried About Running Out of Food in the Last Year: Never true    Guillermo of Food in the Last Year: Never true   Transportation Needs:     Lack of Transportation (Medical):  Lack of Transportation (Non-Medical):    Physical Activity:     Days of Exercise per Week:     Minutes of Exercise per Session:    Stress:     Feeling of Stress :    Social Connections:     Frequency of Communication with Friends and Family:     Frequency of Social Gatherings with Friends and Family:     Attends [employer] Services:     Active Member of Clubs or Organizations:     Attends Club or Organization Meetings:     Marital Status:    Intimate Partner Violence:     Fear of Current or Ex-Partner:     Emotionally Abused:     Physically Abused:     Sexually Abused:      Family History   Problem Relation Age of Onset    Diabetes Mother     Stroke Mother     Alcohol Abuse Father        Skin: (-) rash,(-) psoriasis,(-) eczema, (-)skin cancer. Musculoskeletal: Chronic right upper extremity pain  Neurologic: numbness and tingling  Cardiovascular: (-) Chest pain, (-) swelling in legs/feet, (-) SOB, (-) cramping in legs/feet with walking. SUBJECTIVE:      Constitutional:    The patient is alert and oriented x 3, appears to be stated age and in no distress. Right upper extremity: Positive tenderness over the lateral epicondyle. Negative tenderness over the medial epicondyle. Mild tenderness with resisted wrist extension. Hypersensitivity over the ulnar nerve positive Tinel's. Positive Tinel's and positive Mercy's maneuver at the wrist with numbness and tingling radiating into the thumb index and middle fingers. Full wrist flexion extension. Negative pain with resisted wrist extension. Radial, ulnar, median nerves grossly intact light touch. 2+ radial pulse.     Negative tenderness over the C-spine. Negative Spurling's bilaterally. C-spine flexion to his sternoclavicular joint with no pain. Xrays: Xray: x-rays of the right elbow were obtained today in the office and reviewed with the patient. 2 views: AP/lateral: demonstrate no acute fracture dislocations. Impression: No acute fractures or dislocations. Impression:   Right lateral epicondylitis  Right double crush syndrome with mild carpal tunnel and cervical radiculopathy C5-C6, C7  ADHD, CAD, COPD  GERD  Peripheral vascular disease    Plan:     Discussed findings with patient. Discussed conservative and surgical management with patient. Patient is happy with his response to his right carpal tunnel cortisone injection previously. Did discuss that he does have a double crush injury on the right. If no relief with his injection we may consider referring the patient for his neck to be evaluated. He would like this repeated at today's visit. He also would like a cortisone injection to the right elbow at today's visit. This previously was injected in July 2020. Patient consented injections to the right carpal tunnel right lateral elbow were provided today. He was also provided information on a counterforce brace. Home exercise program was provided to the patient. He will contact the office if he would like to attend therapy. Patient may repeat injections every 3 months as needed. He will contact the office if no relief with his injections. All questions answered. Procedure Note Cortisone Injection for Elbow Epicondylitis    The right lateral epicondyle was identified as the injection site. The risk and benefits of a cortisone injection were explained and the patient consented to the injection. Under sterile conditions, the epicondyle was injected with a mixture of 1 mL of 1% Lidocaine and 1 mL of 6 mg/mL Betamethasone without complication. A sterile bandage was applied.     Procedure Note Carpal Tunnel Injection    The right carpal tunnel was identified as the injection site. The risk and benefits of a cortisone injection were explained and the patient consented to the injection. Under sterile conditions, the carpal canal was injected with a mixture of 1 mL of 1% Lidocaine and 1 mL of 6 mg/mL Betamethasone without complication. A sterile bandage was applied.

## 2021-09-23 ENCOUNTER — OFFICE VISIT (OUTPATIENT)
Dept: ENT CLINIC | Age: 56
End: 2021-09-23
Payer: COMMERCIAL

## 2021-09-23 VITALS
WEIGHT: 200 LBS | BODY MASS INDEX: 26.51 KG/M2 | DIASTOLIC BLOOD PRESSURE: 78 MMHG | SYSTOLIC BLOOD PRESSURE: 134 MMHG | HEIGHT: 73 IN

## 2021-09-23 DIAGNOSIS — M26.623 BILATERAL TEMPOROMANDIBULAR JOINT PAIN: Primary | ICD-10-CM

## 2021-09-23 DIAGNOSIS — J34.89 RHINORRHEA: ICD-10-CM

## 2021-09-23 PROCEDURE — 99203 OFFICE O/P NEW LOW 30 MIN: CPT | Performed by: NURSE PRACTITIONER

## 2021-09-23 RX ORDER — AZELASTINE 1 MG/ML
1-2 SPRAY, METERED NASAL 2 TIMES DAILY PRN
Qty: 30 ML | Refills: 1 | Status: SHIPPED
Start: 2021-09-23 | End: 2022-08-22

## 2021-09-23 NOTE — PROGRESS NOTES
[facility] Otolaryngology  Dr. Lalo Link D.O. Ms.Ed. New Consult       Patient Name:  Ruben Gonzalez  :  1965     CHIEF C/O:    Chief Complaint   Patient presents with    New Patient     pos cerumen and chronic infections       HISTORY OBTAINED FROM:  patient    HISTORY OF PRESENT ILLNESS:       Marichuy Ireland is a 61y.o. year old male, here today for possible cerumen impactions with chronic ear infections. States itching in ears for the past 3-5 year  1 year ago had cerumen removed by PCP after having pain  Continued to have itching in ears with pain and numbness into neck  Placed on drops, antibiotics and steroids with short relief of symptoms  No hx of recurrent ear infections or previous ear surgeries  No current drainage from either ear  Right ear tender in canal when scratching ear  Also complains of persistent rhinorrhea in the am and when eating  Denies congestion, sinus pain or pressure, or post nasal drainage  Currently taking sudafed for symptoms  No current allergy medications.   Denies clenching or grinding his teeth  Wears dentures        Past Medical History:   Diagnosis Date    ADHD (attention deficit hyperactivity disorder)     Bilateral carotid artery stenosis 2019    CAD (coronary artery disease)     Carotid artery stenosis     COPD (chronic obstructive pulmonary disease) (HCC)     Erectile dysfunction     Esophageal erosions     Essential hypertension 2019    Folate deficiency     Gastroesophageal reflux disease without esophagitis 2019    HSV infection     Hyperlipidemia     Hypertension     Liver disease     fatty liver    Other hyperlipidemia 2019    Prostatitis     BRYCE (renal artery stenosis) ([address]     right-stent    Right lateral epicondylitis 2021    Simple chronic bronchitis ([address] 2019    Vitamin B 12 deficiency      Past Surgical History:   Procedure Laterality Date    BACK SURGERY      ruptured disc    CARDIAC CATHETERIZATION      CAROTID ENDARTERECTOMY  10/2018    KNEE ARTHROPLASTY      KNEE ARTHROSCOPY Left        Current Outpatient Medications:     neomycin-polymyxin-hydrocortisone (CORTISPORIN) 3.5-43256-5 otic solution, Place 4 drops into both ears 3 times daily, Disp: 1 Bottle, Rfl: 0    fluticasone (FLONASE) 50 MCG/ACT nasal spray, 2 sprays by Each Nostril route daily, Disp: 1 Bottle, Rfl: 0    aspirin 81 MG EC tablet, Take 81 mg by mouth daily, Disp: , Rfl:     Coenzyme Q10 (CO Q-10) 100 MG CAPS, Take 1 capsule by mouth daily, Disp: 90 capsule, Rfl: 1    simvastatin (ZOCOR) 20 MG tablet, Take 1 tablet by mouth nightly, Disp: 90 tablet, Rfl: 1    clonazePAM (KLONOPIN) 1 MG tablet, Take 1 tablet by mouth daily as needed for Anxiety for up to 30 days. , Disp: 30 tablet, Rfl: 1  Patient has no known allergies. Social History     Tobacco Use    Smoking status: Former Smoker     Packs/day: 1.50     Years: 37.00     Pack years: 55.50     Types: Cigars, Cigarettes     Quit date: 2020     Years since quittin.5    Smokeless tobacco: Never Used   Substance Use Topics    Alcohol use: Yes     Alcohol/week: 3.0 standard drinks     Types: 3 Glasses of wine per week    Drug use: No     Family History   Problem Relation Age of Onset    Diabetes Mother     Stroke Mother     Alcohol Abuse Father        Review of Systems   Constitutional: Negative. Negative for activity change and appetite change. HENT: Positive for ear pain and rhinorrhea. Negative for congestion, ear discharge, postnasal drip, sinus pressure and sinus pain. Eyes: Negative. Respiratory: Negative. Negative for shortness of breath and stridor. Cardiovascular: Negative. Negative for chest pain and palpitations. Endocrine: Negative. Musculoskeletal: Negative. Skin: Negative. Neurological: Negative. Negative for dizziness. Hematological: Negative. Psychiatric/Behavioral: Negative.         /78   Ht 6' 1\" (1.854 m)   Wt 200 lb (90.7 kg)   BMI 26.39 kg/m²   Physical Exam  Constitutional:       Appearance: Normal appearance. HENT:      Head: Normocephalic. Jaw: Tenderness present. Right Ear: Tympanic membrane, ear canal and external ear normal.      Left Ear: Tympanic membrane, ear canal and external ear normal.      Nose: Rhinorrhea present. Rhinorrhea is clear. Right Turbinates: Not swollen or pale. Left Turbinates: Not swollen or pale. Mouth/Throat:      Lips: Pink. Dentition: Has dentures. Pharynx: Oropharynx is clear. Eyes:      Conjunctiva/sclera: Conjunctivae normal.      Pupils: Pupils are equal, round, and reactive to light. Cardiovascular:      Rate and Rhythm: Normal rate and regular rhythm. Pulses: Normal pulses. Pulmonary:      Effort: Pulmonary effort is normal. No respiratory distress. Breath sounds: No stridor. Musculoskeletal:         General: Normal range of motion. Cervical back: Normal range of motion. No rigidity. No muscular tenderness. Skin:     General: Skin is warm and dry. Neurological:      General: No focal deficit present. Mental Status: He is alert and oriented to person, place, and time. Psychiatric:         Mood and Affect: Mood normal.         Behavior: Behavior normal.         Thought Content: Thought content normal.         Judgment: Judgment normal.         IMPRESSION/PLAN:  Pollo Suero was seen today for new patient. Diagnoses and all orders for this visit:    Bilateral temporomandibular joint pain    Rhinorrhea    Other orders  -     azelastine (ASTELIN) 0.1 % nasal spray; 1-2 sprays by Nasal route 2 times daily as needed for Rhinitis Use in each nostril as directed      Patient will be placed on Astelin spray, 1 to 2 sprays each nostril twice daily as needed for rhinitis symptoms. Also recommended the patient begin using warm compresses and NSAID medications for bilateral TMJ tenderness.   Patient here to a soft diet over the next 1 month.    He will follow up in 1 month.   Is instructed to call with any new or worsening symptoms prior to his next appointment      JASON Holly, FNP-C  [facility], Nose and Throat    The information contained in this note has been dictated using drug and medical speech recognition software and may contain errors

## 2021-10-04 ASSESSMENT — ENCOUNTER SYMPTOMS
SINUS PAIN: 0
STRIDOR: 0
SHORTNESS OF BREATH: 0
RESPIRATORY NEGATIVE: 1
RHINORRHEA: 1
SINUS PRESSURE: 0
EYES NEGATIVE: 1

## 2021-10-27 ENCOUNTER — TELEPHONE (OUTPATIENT)
Dept: FAMILY MEDICINE CLINIC | Age: 56
End: 2021-10-27

## 2021-10-27 ENCOUNTER — OFFICE VISIT (OUTPATIENT)
Dept: ENT CLINIC | Age: 56
End: 2021-10-27
Payer: COMMERCIAL

## 2021-10-27 ENCOUNTER — OFFICE VISIT (OUTPATIENT)
Dept: FAMILY MEDICINE CLINIC | Age: 56
End: 2021-10-27
Payer: COMMERCIAL

## 2021-10-27 VITALS
SYSTOLIC BLOOD PRESSURE: 130 MMHG | WEIGHT: 207 LBS | RESPIRATION RATE: 14 BRPM | HEART RATE: 71 BPM | HEIGHT: 73 IN | OXYGEN SATURATION: 97 % | DIASTOLIC BLOOD PRESSURE: 80 MMHG | BODY MASS INDEX: 27.43 KG/M2

## 2021-10-27 VITALS
TEMPERATURE: 97.5 F | DIASTOLIC BLOOD PRESSURE: 76 MMHG | OXYGEN SATURATION: 98 % | SYSTOLIC BLOOD PRESSURE: 120 MMHG | WEIGHT: 207 LBS | BODY MASS INDEX: 27.31 KG/M2 | HEART RATE: 77 BPM

## 2021-10-27 DIAGNOSIS — H60.8X3 CHRONIC ECZEMATOUS OTITIS EXTERNA OF BOTH EARS: Primary | ICD-10-CM

## 2021-10-27 DIAGNOSIS — I10 ESSENTIAL HYPERTENSION: ICD-10-CM

## 2021-10-27 DIAGNOSIS — N40.1 BENIGN PROSTATIC HYPERPLASIA WITH LOWER URINARY TRACT SYMPTOMS, SYMPTOM DETAILS UNSPECIFIED: ICD-10-CM

## 2021-10-27 DIAGNOSIS — R10.9 ABDOMINAL PAIN, UNSPECIFIED ABDOMINAL LOCATION: ICD-10-CM

## 2021-10-27 DIAGNOSIS — Z12.5 SCREENING FOR MALIGNANT NEOPLASM OF PROSTATE: ICD-10-CM

## 2021-10-27 DIAGNOSIS — I10 ESSENTIAL HYPERTENSION: Primary | ICD-10-CM

## 2021-10-27 DIAGNOSIS — R20.0 NUMBNESS: ICD-10-CM

## 2021-10-27 DIAGNOSIS — E78.49 OTHER HYPERLIPIDEMIA: ICD-10-CM

## 2021-10-27 DIAGNOSIS — Z92.29 HISTORY OF REGULAR MEDICATION USE: ICD-10-CM

## 2021-10-27 LAB
ALBUMIN SERPL-MCNC: 4.6 G/DL (ref 3.5–5.2)
ALP BLD-CCNC: 70 U/L (ref 40–129)
ALT SERPL-CCNC: 22 U/L (ref 0–40)
AMYLASE: 118 U/L (ref 20–100)
ANION GAP SERPL CALCULATED.3IONS-SCNC: 15 MMOL/L (ref 7–16)
AST SERPL-CCNC: 21 U/L (ref 0–39)
BASOPHILS ABSOLUTE: 0.06 E9/L (ref 0–0.2)
BASOPHILS RELATIVE PERCENT: 0.5 % (ref 0–2)
BILIRUB SERPL-MCNC: 0.4 MG/DL (ref 0–1.2)
BUN BLDV-MCNC: 13 MG/DL (ref 6–20)
CALCIUM SERPL-MCNC: 9.6 MG/DL (ref 8.6–10.2)
CHLORIDE BLD-SCNC: 105 MMOL/L (ref 98–107)
CHOLESTEROL, TOTAL: 185 MG/DL (ref 0–199)
CO2: 18 MMOL/L (ref 22–29)
CREAT SERPL-MCNC: 0.8 MG/DL (ref 0.7–1.2)
EOSINOPHILS ABSOLUTE: 0.15 E9/L (ref 0.05–0.5)
EOSINOPHILS RELATIVE PERCENT: 1.4 % (ref 0–6)
GFR AFRICAN AMERICAN: >60
GFR NON-AFRICAN AMERICAN: >60 ML/MIN/1.73
GLUCOSE BLD-MCNC: 113 MG/DL (ref 74–99)
HCT VFR BLD CALC: 44.6 % (ref 37–54)
HDLC SERPL-MCNC: 53 MG/DL
HEMOGLOBIN: 14.9 G/DL (ref 12.5–16.5)
IMMATURE GRANULOCYTES #: 0.05 E9/L
IMMATURE GRANULOCYTES %: 0.5 % (ref 0–5)
LDL CHOLESTEROL CALCULATED: 113 MG/DL (ref 0–99)
LYMPHOCYTES ABSOLUTE: 2.28 E9/L (ref 1.5–4)
LYMPHOCYTES RELATIVE PERCENT: 20.9 % (ref 20–42)
MCH RBC QN AUTO: 31.2 PG (ref 26–35)
MCHC RBC AUTO-ENTMCNC: 33.4 % (ref 32–34.5)
MCV RBC AUTO: 93.3 FL (ref 80–99.9)
MONOCYTES ABSOLUTE: 0.83 E9/L (ref 0.1–0.95)
MONOCYTES RELATIVE PERCENT: 7.6 % (ref 2–12)
NEUTROPHILS ABSOLUTE: 7.56 E9/L (ref 1.8–7.3)
NEUTROPHILS RELATIVE PERCENT: 69.1 % (ref 43–80)
PDW BLD-RTO: 13.4 FL (ref 11.5–15)
PLATELET # BLD: 300 E9/L (ref 130–450)
PMV BLD AUTO: 12 FL (ref 7–12)
POTASSIUM SERPL-SCNC: 4.6 MMOL/L (ref 3.5–5)
PROSTATE SPECIFIC ANTIGEN: 0.68 NG/ML (ref 0–4)
RBC # BLD: 4.78 E12/L (ref 3.8–5.8)
SODIUM BLD-SCNC: 138 MMOL/L (ref 132–146)
TOTAL PROTEIN: 6.8 G/DL (ref 6.4–8.3)
TRIGL SERPL-MCNC: 94 MG/DL (ref 0–149)
VLDLC SERPL CALC-MCNC: 19 MG/DL
WBC # BLD: 10.9 E9/L (ref 4.5–11.5)

## 2021-10-27 PROCEDURE — 99213 OFFICE O/P EST LOW 20 MIN: CPT | Performed by: NURSE PRACTITIONER

## 2021-10-27 PROCEDURE — 99214 OFFICE O/P EST MOD 30 MIN: CPT | Performed by: INTERNAL MEDICINE

## 2021-10-27 RX ORDER — TRIAMCINOLONE ACETONIDE 0.25 MG/G
CREAM TOPICAL
Qty: 1 EACH | Refills: 1 | Status: SHIPPED
Start: 2021-10-27 | End: 2021-12-29

## 2021-10-27 RX ORDER — TADALAFIL 5 MG/1
5 TABLET ORAL DAILY
Qty: 90 TABLET | Refills: 1 | Status: SHIPPED | OUTPATIENT
Start: 2021-10-27

## 2021-10-27 RX ORDER — METHYLPREDNISOLONE 4 MG/1
4 TABLET ORAL SEE ADMIN INSTRUCTIONS
Qty: 1 KIT | Refills: 0 | Status: SHIPPED | OUTPATIENT
Start: 2021-10-27 | End: 2021-11-02

## 2021-10-27 RX ORDER — PANTOPRAZOLE SODIUM 20 MG/1
20 TABLET, DELAYED RELEASE ORAL
Qty: 30 TABLET | Refills: 5 | Status: SHIPPED | OUTPATIENT
Start: 2021-10-27

## 2021-10-27 RX ORDER — SIMVASTATIN 20 MG
20 TABLET ORAL NIGHTLY
Qty: 90 TABLET | Refills: 1 | Status: SHIPPED | OUTPATIENT
Start: 2021-10-27

## 2021-10-27 NOTE — PROGRESS NOTES
[facility]  Dr. Fany Doe. Marie Mayer. Ms.Ed        Patient Name:  Felicia Mcguire  :  1965     CHIEF C/O:    Chief Complaint   Patient presents with    Ear Problem     1mo. chronic ear infections. still has daily drainage, constant itching        HISTORY OBTAINED FROM:  patient    HISTORY OF PRESENT ILLNESS:       Sonia Rachel is a 61y.o. year old male, here today for follow up of ear check, sinus drainage. Last seen 1 month ago  Started on warm compresses and NSAIDs for TMJ  No further pain in either ear  Ears continue to itch  No drainage  Also some numbness into right neck, had before after carotid surgery, feels the same    Started astelin spray for persistent PND  States that it has helped but continues to have considerable rhinorrhea in the mornings  No other congestion, sinus pain or pressure.           Past Medical History:   Diagnosis Date    ADHD (attention deficit hyperactivity disorder)     Bilateral carotid artery stenosis 2019    CAD (coronary artery disease)     Carotid artery stenosis     [facility] pulmonary [facility]) [facility])     Erectile dysfunction     Esophageal erosions     Essential hypertension 2019    Folate deficiency     Gastroesophageal reflux disease without esophagitis 2019    HSV infection     Hyperlipidemia     Hypertension     Liver disease     fatty liver    Other hyperlipidemia 2019    Prostatitis     BRYCE (renal artery stenosis) ([facility])     right-stent    Right lateral epicondylitis 2021    Simple chronic bronchitis ([address] 2019    Vitamin B 12 deficiency      Past Surgical History:   Procedure Laterality Date    BACK SURGERY      ruptured disc    CARDIAC CATHETERIZATION      CAROTID ENDARTERECTOMY  10/2018    KNEE ARTHROPLASTY      KNEE ARTHROSCOPY Left        Current Outpatient Medications:     simvastatin (ZOCOR) 20 MG tablet, Take 1 tablet by mouth nightly, Disp: 90 tablet, Rfl: 1    tadalafil (CIALIS) Neurological: Positive for numbness (Left neck). Negative for dizziness. Hematological: Negative. Psychiatric/Behavioral: Negative. /80 (Site: Left Upper Arm, Position: Sitting, Cuff Size: Medium Adult)   Pulse 71   Resp 14   Ht 6' 1\" (1.854 m)   Wt 207 lb (93.9 kg)   SpO2 97%   BMI 27.31 kg/m²   Physical Exam  Constitutional:       Appearance: Normal appearance. HENT:      Head: Normocephalic. Jaw: There is normal jaw occlusion. No tenderness. Right Ear: Tympanic membrane, ear canal and external ear normal.      Left Ear: Tympanic membrane, ear canal and external ear normal.      Ears:        Nose: Rhinorrhea present. Rhinorrhea is clear. Right Turbinates: Not swollen or pale. Left Turbinates: Not swollen or pale. Mouth/Throat:      Lips: Pink. Dentition: Has dentures. Pharynx: Oropharynx is clear. Eyes:      Conjunctiva/sclera: Conjunctivae normal.      Pupils: Pupils are equal, round, and reactive to light. Neck:     Cardiovascular:      Rate and Rhythm: Normal rate and regular rhythm. Pulses: Normal pulses. Pulmonary:      Effort: Pulmonary effort is normal. No respiratory distress. Breath sounds: No stridor. Musculoskeletal:         General: Normal range of motion. Cervical back: Normal range of motion. No rigidity. No muscular tenderness. Skin:     General: Skin is warm and dry. Neurological:      General: No focal deficit present. Mental Status: He is alert and oriented to person, place, and time. Psychiatric:         Mood and Affect: Mood normal.         Behavior: Behavior normal.         Thought Content: Thought content normal.         Judgment: Judgment normal.         IMPRESSION/PLAN:  Marichuy Ireland was seen today for ear problem.     Diagnoses and all orders for this visit:    Chronic eczematous otitis externa of both ears    Numbness    Other orders  -     triamcinolone (KENALOG) 0.025 % cream; Apply to bilateral ear canals 2-3 times weekly for itching, stop when symptoms resolved. -     methylPREDNISolone (MEDROL DOSEPACK) 4 MG tablet; Take 1 tablet by mouth See Admin Instructions for 6 days Take by mouth. Patient will placed on Kenalog cream for bilateral eczematoid otitis externa with directions to use 2-3 times weekly for itching and stop when symptoms resolved. He also be placed on a Medrol dose pack for numbness and sensitivity to the left neck in line with previous incision from carotid endarterectomy. Patient will follow up in 1 month. He is instructed to call with any new or worsening symptoms prior to his next appointment.         Dustin Chaudhari, JASON, FNP-C  [facility], Nose and Throat    The information contained in this note has been dictated using drug and medical speech recognition software and may contain errors

## 2021-10-28 ENCOUNTER — TELEPHONE (OUTPATIENT)
Dept: FAMILY MEDICINE CLINIC | Age: 56
End: 2021-10-28

## 2021-10-28 DIAGNOSIS — R10.9 ABDOMINAL PAIN, UNSPECIFIED ABDOMINAL LOCATION: Primary | ICD-10-CM

## 2021-10-28 LAB — LIPASE: 96 U/L (ref 13–60)

## 2021-10-28 NOTE — PROGRESS NOTES
[id number] [facility]     10/27/21  Karolina Guest : 1965 Sex: male  Age: 61 y.o. Chief Complaint   Patient presents with    Other     4 months       HPI    Patient presents today for follow-up visit on his medical problems. Reviewed last couple notes. He did have a low-dose CT of the chest screening for his smoking history and came back unremarkable. We did review the films with him. He has been seeing ear nose and throat regarding his ear symptoms and I did review their notes. He states he is some better. Is also seen orthopedics regarding his elbow tendinitis still having some issue with that. Which is rather generalized no change in his bowel habits. Denies any blood in the stool or black tarry stool. He did have one episode of vomiting he states this has been going on now off and on for a few months but getting worse. He also complains of nocturia and sense of urgency. They have tried to start him on Cialis daily while ago through urology but apparently insurance would not cover that at the time price is since gone down and we will try once again to get him on the [facility] does continue to remain smoke-free for over a year now  Patient's lipids have been stable on his statin medication. He has been checking blood pressure occasionally and has been good. He is off any antihypertensive medicine at this time  He continues to follow with vascular Dr. Morris Founds his carotid vascular disease Dr. Jann Segura for his carpal tunnel syndrome and elbow tendinitis. Isabel Leon longer follows with urology for history of microscopic hematuriadication to help with flow and sense of urgency. Review of Systems     Constitutional: Negative for activity change, appetite change, chills, diaphoresis, fatigue, fever and unexpected weight change. HENT: Negative for ear pain. He does complain of eustachian tube dysfunction type symptoms however.     Respiratory: Negative for cough shortness of breath and wheezing.     Cardiovascular: Negative for, chest pain, palpitations and leg swelling. Gastrointestinal: Negative for abdominal pain, blood in stool, constipation, diarrhea, nausea and vomiting.        GERD improved   Endocrine: Negative.    Genitourinary: Negative for difficulty urinating, dysuria,, frequency hematuria and urgency.    Musculoskeletal: Positive for arthralgias and  right arm pain .  Follows with orthopedics.  Negative for back pain, gait problem and myalgias. Skin: Negative.    Allergic/Immunologic: Negative for environmental allergies and immunocompromised state. Neurological:Negative for dizziness, headache weakness, light-headedness and numbness.        Dizziness and headache resolved   Hematological: Negative.    Psychiatric/Behavioral: Positive for sleep disturbance. The patient is nervous/anxious.    We have tried SSRIs and he has failed.            REST OF PERTINENT ROS GONE OVER AND WAS NEGATIVE.      PMH:  Shot Record:  -UPAGAID Per 10 MG-NDC#[id number] Injection 02/04/08  -Yhft Medrol up to 40mg Injection 06/11/15 12/05/14  -Lincomycin Hci To 300 MG (Lincocin) Injection 02/04/08  -Cgqr Medrol 40 MG Injection 10/28/17 06/22/09. Health Maintenance:  Colonoscopy Screening - (12/22/2016)  diverticulosis  EKG - (3/2007) 3/07,5/19,12/12, 10/14, 6/18, 9/18  Stress Test - (6/2009) 2009-negative,1/13-neg  EGD - (2016) GERD  esophageal erosions  Influenza Vaccination - (10/18/2018) Patient was Counseled, but refused.   Heart catheterization - 2006-mild CAD  Colonoscopy - 12/16-due 26  Carotid Artery Study - 9/18, 4/20  Medical Problems:  Hypertension - renal artery stenosis on the right-stent  Chronic Obstructive Pulmonary Disease (COPD), Gastroesophageal Reflux Disease (GERD)  esophageal erosions - EGD 2016  Hyperlipidemia, Vitamin B12 and folate deficiency, Attention Deficit Hyperactivity Disorder  CAD - Mild per 2006 heart cath  HSV, Fatty Liver, Left fibula fracture, Prostatitis, Erectile Dysfunction, carotid vascular disease-sees vascular  Surgical Hx:  Left Knee Arthroscopy - Dr Yenny Chan with a stent in renal artery - ()  carotid endarterectomy-right - (10/2018)  Reviewed, no changes. FH:  Father:  Alcoholism -  age 79. Mother:  Cerebrovascular Accident (CVA), Non Insulin Dependent Diabetes,  age 76. Reviewed, no changes. 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  Quit again .  Drinks 2 glasses of wine a day or more, advised to quit  when GERD got worse in 10/2018                   Current Outpatient Medications:     simvastatin (ZOCOR) 20 MG tablet, Take 1 tablet by mouth nightly, Disp: 90 tablet, Rfl: 1    tadalafil (CIALIS) 5 MG tablet, Take 1 tablet by mouth daily, Disp: 90 tablet, Rfl: 1    pantoprazole (PROTONIX) 20 MG tablet, Take 1 tablet by mouth every morning (before breakfast), Disp: 30 tablet, Rfl: 5    aspirin 81 MG EC tablet, Take 81 mg by mouth daily, Disp: , Rfl:     triamcinolone (KENALOG) 0.025 % cream, Apply to bilateral ear canals 2-3 times weekly for itching, stop when symptoms resolved., Disp: 1 each, Rfl: 1    methylPREDNISolone (MEDROL DOSEPACK) 4 MG tablet, Take 1 tablet by mouth See Admin Instructions for 6 days Take by mouth., Disp: 1 kit, Rfl: 0    azelastine (ASTELIN) 0.1 % nasal spray, 1-2 sprays by Nasal route 2 times daily as needed for Rhinitis Use in each nostril as directed, Disp: 30 mL, Rfl: 1    neomycin-polymyxin-hydrocortisone (CORTISPORIN) 3.5-52453-6 otic solution, Place 4 drops into both ears 3 times daily, Disp: 1 Bottle, Rfl: 0    fluticasone (FLONASE) 50 MCG/ACT nasal spray, 2 sprays by Each Nostril route daily, Disp: 1 Bottle, Rfl: 0    Coenzyme Q10 (CO Q-10) 100 MG CAPS, Take 1 capsule by mouth daily, Disp: 90 capsule, Rfl: 1    clonazePAM (KLONOPIN) 1 MG tablet, Take 1 tablet by mouth daily as needed for Anxiety for up to 30 days. , Disp: 30 tablet, Rfl: 1  No Known Allergies    Past Medical History:   Diagnosis Date    ADHD (attention deficit hyperactivity disorder)     Bilateral carotid artery stenosis 2019    CAD (coronary artery disease)     Carotid artery stenosis     COPD (chronic obstructive pulmonary disease) (HCC)     Erectile dysfunction     Esophageal erosions     Essential hypertension 2019    Folate deficiency     Gastroesophageal reflux disease without esophagitis 2019    HSV infection     Hyperlipidemia     Hypertension     Liver disease     fatty liver    Other hyperlipidemia 2019    Prostatitis     BRYCE (renal artery stenosis) ([facility]     right-stent    Right lateral epicondylitis 2021    Simple chronic bronchitis ([facility] 2019    Vitamin B 12 deficiency      Past Surgical History:   Procedure Laterality Date    BACK SURGERY      ruptured disc    CARDIAC CATHETERIZATION      CAROTID ENDARTERECTOMY  10/2018    KNEE ARTHROPLASTY      KNEE ARTHROSCOPY Left      Family History   Problem Relation Age of Onset    Diabetes Mother     Stroke Mother     Alcohol Abuse Father      Social History     Socioeconomic History    Marital status:      Spouse name: Not on file    Number of children: Not on file    Years of education: Not on file    Highest education level: Not on file   Occupational History    Not on file   Tobacco Use    Smoking status: Former Smoker     Packs/day: 1.50     Years: 37.00     Pack years: 55.50     Types: Cigars, Cigarettes     Quit date: 2020     Years since quittin.6    Smokeless tobacco: Never Used   Substance and Sexual Activity    Alcohol use:  Yes     Alcohol/week: 3.0 standard drinks     Types: 3 Glasses of wine per week    Drug use: No    Sexual activity: Not on file   Other Topics Concern    Not on file   Social History Narrative    Not on file Social Determinants of Health     Financial Resource Strain: Low Risk     Difficulty of Paying Living Expenses: Not hard at all   Food Insecurity: No Food Insecurity    Worried About Running Out of Food in the Last Year: Never true    Guillermo of Food in the Last Year: Never true   Transportation Needs:     Lack of Transportation (Medical):  Lack of Transportation (Non-Medical):    Physical Activity:     Days of Exercise per Week:     Minutes of Exercise per Session:    Stress:     Feeling of Stress :    Social Connections:     Frequency of Communication with Friends and Family:     Frequency of Social Gatherings with Friends and Family:     Attends Anabaptist Services:     Active Member of Clubs or Organizations:     Attends Club or Organization Meetings:     Marital Status:    Intimate Partner Violence:     Fear of Current or Ex-Partner:     Emotionally Abused:     Physically Abused:     Sexually Abused:        Vitals:    10/27/21 0704   BP: 120/76   Pulse: 77   Temp: 97.5 °F (36.4 °C)   SpO2: 98%   Weight: 207 lb (93.9 kg)       Physical Exam    const: Appears well developed and well nourished. No signs of acute distress present. HEENT: Could not visualize right TM secondary to cerumen left side looked okay. Mouth no lesions throat not injected     Neck: Supple and symmetric. Palpation reveals no adenopathy. No masses appreciated. Thyroid  exhibits no nodule or thyromegaly. No JVD. Carotids: 2+ and equal bilaterally, without bruits/right  carotid endarterectomy scar noted  Resp: No rales, rhonchi or wheezes appreciated over the lungs bilaterally/slightly prolonged expiratory  phase. CV: Rate is regular. Rhythm is regular. S1 is normal. S2 is normal. No gallop or rubs. No heart.   murmur appreciated.    extremities: No clubbing, cyanosis or edema. Abdomen: Bowel sounds are normoactive. Palpation reveals softness, with no distension,  organomegaly or tenderness.  No abdominal masses palpable. No palpable hepatosplenomegaly. Musculo: Walks with a normal gait. Upper Extremities: Full ROM bilaterally.   lower Extremities:  Full ROM bilaterally.  Good pulses. Skin: Dry and warm with no rash. Neuro: Alert and oriented x3. Mood is normal. Affect is normal. Speech is articulate and fluent. Upper Extremities: motor strength is intact. Normal muscle tone bilaterally. Lower Extremities: motor  strength is intact. Normal muscle tone bilaterally. Sensation intact to light touch. Reflexes: DTR's are  symmetric, intact and 2+ bilaterally. Cranial Nerves: Cranial nerves II-XII intact. Psych: Patient's attitude is cooperative. Patient's affect is  appropriate. judgement is realistic. Insight is  appropriate.            Assessment and Plan:  Alejandra Dang was seen today for other. Diagnoses and all orders for this visit:    Essential hypertension  -     Comprehensive Metabolic Panel; Future  -     CBC Auto Differential; Future  Stable currently on no medication    Other hyperlipidemia  -     Lipid Panel; Future  Stable on statin medication    Abdominal pain, unspecified abdominal location  -     Aletha Lopez MD, General Surgery, [facility] 10 Metabolic Panel; Future  -     CBC Auto Differential; Future  -     AMYLASE; Future  We will check blood work and refer to Dr. Ashley Taylor of surgery. I feel he probably needs scoped up and down. We will get abdominal films. Restarting Protonix    History of regular medication use    Screening for malignant neoplasm of prostate  -     PSA screening; Future    Benign prostatic hyperplasia with lower urinary tract symptoms, symptom details unspecified  Start Cialis    Other orders  -     simvastatin (ZOCOR) 20 MG tablet; Take 1 tablet by mouth nightly  -     tadalafil (CIALIS) 5 MG tablet; Take 1 tablet by mouth daily  -     pantoprazole (PROTONIX) 20 MG tablet;  Take 1 tablet by mouth every morning (before breakfast)    Plan: Blood work including PSA and amylase to monitor disease progression and medication use. Set him up with Dr. Myra Dillon of surgery regarding his abdominal discomfort. Started Cialis and Protonix. Prescription management performed. Follow-up with above consultants. Look over B12/folate status next time I see him. Back in 4 months and as needed. Return in about 4 months (around 2/27/2022). Seen By:  Ami Bah MD      *Document was created using voice recognition software. Note was reviewed however may contain grammatical errors.

## 2021-10-28 NOTE — TELEPHONE ENCOUNTER
Amylase and lipase are both slightly elevated. These are gastrointestinal enzymes. May end up needing a CAT scan related to this but would like to repeat this 1 more time in a week before doing anything else. Order is in.   Needs to follow-up with Dr. Heath Donato per my referral    See previous encounters

## 2021-10-28 NOTE — TELEPHONE ENCOUNTER
Lipase was able to be added onto blood work. Had to leave a voicemail for the patient to call the office back regarding the abdominal xray.

## 2021-10-28 NOTE — TELEPHONE ENCOUNTER
Forgot to get abdominal x-ray on him while he was here at visit. See if he is able to come back sometime in the next week and have this done.

## 2021-11-03 ENCOUNTER — TELEPHONE (OUTPATIENT)
Dept: SURGERY | Age: 56
End: 2021-11-03

## 2021-11-03 NOTE — TELEPHONE ENCOUNTER
Scheduled the patient on 11/26/21 at [place] [id number] in [place] office. Bring ID, medication list and insurance card. The patient verbalized understanding.   Electronically signed by Kathrin La on 11/3/21 at 10:28 AM EDT

## 2021-11-04 ASSESSMENT — ENCOUNTER SYMPTOMS
RESPIRATORY NEGATIVE: 1
SINUS PAIN: 0
SINUS PRESSURE: 0
STRIDOR: 0
EYES NEGATIVE: 1
SHORTNESS OF BREATH: 0
RHINORRHEA: 1

## 2021-11-05 ENCOUNTER — TELEPHONE (OUTPATIENT)
Dept: FAMILY MEDICINE CLINIC | Age: 56
End: 2021-11-05

## 2021-11-05 DIAGNOSIS — R10.9 ABDOMINAL PAIN, UNSPECIFIED ABDOMINAL LOCATION: ICD-10-CM

## 2021-11-05 LAB
AMYLASE: 70 U/L (ref 20–100)
LIPASE: 48 U/L (ref 13–60)

## 2021-11-06 ENCOUNTER — TELEPHONE (OUTPATIENT)
Dept: FAMILY MEDICINE CLINIC | Age: 56
End: 2021-11-06

## 2021-11-08 NOTE — TELEPHONE ENCOUNTER
Called patient and notified him that his abdominal xray was normal and to follow-up with Dr Myra Dillon. Patient verbalized understanding and agreed to follow-up.

## 2021-11-26 ENCOUNTER — OFFICE VISIT (OUTPATIENT)
Dept: SURGERY | Age: 56
End: 2021-11-26
Payer: COMMERCIAL

## 2021-11-26 VITALS
HEIGHT: 73 IN | DIASTOLIC BLOOD PRESSURE: 75 MMHG | BODY MASS INDEX: 27.28 KG/M2 | SYSTOLIC BLOOD PRESSURE: 124 MMHG | HEART RATE: 71 BPM | WEIGHT: 205.8 LBS | TEMPERATURE: 98.1 F | OXYGEN SATURATION: 97 %

## 2021-11-26 DIAGNOSIS — R10.9 ABDOMINAL CRAMPING: ICD-10-CM

## 2021-11-26 DIAGNOSIS — R10.11 RUQ PAIN: Primary | ICD-10-CM

## 2021-11-26 PROCEDURE — 99203 OFFICE O/P NEW LOW 30 MIN: CPT | Performed by: SURGERY

## 2021-11-26 RX ORDER — DICYCLOMINE HYDROCHLORIDE 10 MG/1
10 CAPSULE ORAL 4 TIMES DAILY
Qty: 120 CAPSULE | Refills: 3 | Status: SHIPPED
Start: 2021-11-26 | End: 2021-12-29

## 2021-11-26 NOTE — PROGRESS NOTES
[facility] Note    Assessment/Plan:      Diagnosis Orders   1. RUQ pain  US GALLBLADDER RUQ    dicyclomine (BENTYL) 10 MG capsule    We will check an ultrasound. Recommended he continue his PPI. 2. Abdominal crampinglikely functional/IBS related  dicyclomine (BENTYL) 10 MG capsule    We will plan for upper and lower endoscopy. Trial Bentyl. Dietary modification discussed. Return for Endoscopy. Chief Complaint   Patient presents with    New Patient     ref from dr Amos Eason for abdominal pain.  going on for months       PCP: Avery Sawant MD    HPI: Wilman Suggs is a 61 y.o. male who presents in consultation for \"stomach problems. \"  He says he has had years of upper abdominal pain that is so severe his wife cannot even touch his stomach. He says he has been having emesis more so over the last few months as well. He has abdominal pain \"all the time. \"  He describes this as gas pain. Is worse lately. Is worse with food but nothing in particular. There are no other exacerbating or alleviating symptoms. He says he saw GI about 5 or 6 years ago. Sound like he had endoscopies for similar reasons and symptoms. He had a hiatal hernia noted. He also had diverticulosis. He says biopsies were also done but those are not available in our system. Complains of alternating runny and solid stools. Occasionally he will has some bright red blood if he goes multiple times of the day. There is no history of diabetes. He did just recently start Protonix. His pain is only in the right upper quadrant currently today.       Past Medical History:   Diagnosis Date    ADHD (attention deficit hyperactivity disorder)     Bilateral carotid artery stenosis 5/29/2019    CAD (coronary artery disease)     Carotid artery stenosis     COPD (chronic obstructive pulmonary disease) (HCC)     Erectile dysfunction     Esophageal erosions     Essential hypertension 5/29/2019    Folate deficiency     Gastroesophageal reflux disease without esophagitis 5/29/2019    HSV infection     Hyperlipidemia     Hypertension     Liver disease     fatty liver    Other hyperlipidemia 5/29/2019    Prostatitis     BRYCE (renal artery stenosis) (HCC)     right-stent    Right lateral epicondylitis 5/27/2021    Simple chronic bronchitis ([address] 5/29/2019    Vitamin B 12 deficiency        Past Surgical History:   Procedure Laterality Date    BACK SURGERY      ruptured disc    CARDIAC CATHETERIZATION      CAROTID ENDARTERECTOMY  10/2018    COLONOSCOPY      KNEE ARTHROPLASTY      KNEE ARTHROSCOPY Left     UPPER GASTROINTESTINAL ENDOSCOPY         Prior to Admission medications    Medication Sig Start Date End Date Taking? Authorizing Provider   dicyclomine (BENTYL) 10 MG capsule Take 1 capsule by mouth 4 times daily 11/26/21  Yes Kwesi Rodriguez MD   simvastatin (ZOCOR) 20 MG tablet Take 1 tablet by mouth nightly 10/27/21  Yes Luz Elena Kraus MD   pantoprazole (PROTONIX) 20 MG tablet Take 1 tablet by mouth every morning (before breakfast) 10/27/21  Yes Luz Elena Kraus MD   triamcinolone (KENALOG) 0.025 % cream Apply to bilateral ear canals 2-3 times weekly for itching, stop when symptoms resolved.  10/27/21  Yes DULCE Solorio CNP   azelastine (ASTELIN) 0.1 % nasal spray 1-2 sprays by Nasal route 2 times daily as needed for Rhinitis Use in each nostril as directed 9/23/21  Yes DULCE Solorio CNP   neomycin-polymyxin-hydrocortisone (CORTISPORIN) 3.5-56149-1 otic solution Place 4 drops into both ears 3 times daily 8/11/21  Yes Teofilo Ramirez, DO   fluticasone (FLONASE) 50 MCG/ACT nasal spray 2 sprays by Each Nostril route daily 6/23/21  Yes Luz Elena Kraus MD   aspirin 81 MG EC tablet Take 81 mg by mouth daily   Yes Historical Provider, MD   Coenzyme Q10 (CO Q-10) 100 MG CAPS Take 1 capsule by mouth daily 5/21/21  Yes Luz Elena Kraus MD   tadalafil (CIALIS) 5 MG tablet Take 1 tablet by mouth daily 10/27/21   Irene Camejo on file    Physically Abused: Not on file    Sexually Abused: Not on file   Housing Stability:     Unable to Pay for Housing in the Last Year: Not on file    Number of Places Lived in the Last Year: Not on file    Unstable Housing in the Last Year: Not on file       Family History   Problem Relation Age of Onset    Diabetes Mother     Stroke Mother     Alcohol Abuse Father        Review of Systems   All other systems reviewed and are negative. Objective:  Vitals:    11/26/21 0956   BP: 124/75   Pulse: 71   Temp: 98.1 °F (36.7 °C)   SpO2: 97%   Weight: 205 lb 12.8 oz (93.4 kg)   Height: 6' 1\" (1.854 m)          Physical Exam  Constitutional:       General: He is not in acute distress. Appearance: He is not diaphoretic. HENT:      Head: Normocephalic and atraumatic. Eyes:      General:         Right eye: No discharge. Left eye: No discharge. Neck:      Trachea: No tracheal deviation. Cardiovascular:      Rate and Rhythm: Normal rate. Pulmonary:      Effort: Pulmonary effort is normal. No respiratory distress. Abdominal:      General: There is no distension. Palpations: Abdomen is soft. Tenderness: There is abdominal tenderness (Mild right upper quadrant). There is no guarding or rebound. Skin:     General: Skin is warm and dry. Neurological:      Mental Status: He is alert and oriented to person, place, and time. Dalia Addison MD  11/29/2021    NOTE: This report, in part or full,may have been transcribed using voice recognition software. Every effort was made to ensure accuracy; however, inadvertent computerized transcription errors may be present. Please excuse any transcriptional grammatical or spelling errors that may have escaped my editorial review.     CC: Ana Case MD

## 2021-12-02 ENCOUNTER — TELEPHONE (OUTPATIENT)
Dept: SURGERY | Age: 56
End: 2021-12-02

## 2021-12-02 NOTE — TELEPHONE ENCOUNTER
Rufino Julissa is scheduled for EGD/colonosocpy with Dr Lily Fang on 12-30-21 at [facility] at 11:00 am. Patient was told to arrive at 10:00 am. Patient needs to be NPO after midnight the night before procedure. All surgery instructions were explained to the patient and a surgery letter was also mailed out. MA informed patient that PAT will also be calling to review pre-op instructions and medications. Patient verbalized understanding.   Electronically signed by Zofia Pinedo MA on 12/2/2021 at 7:46 AM

## 2021-12-02 NOTE — TELEPHONE ENCOUNTER
Prior Authorization Form:      DEMOGRAPHICS:                     Patient Name:  Filomena Goode  Patient :  1965            Insurance:  Payor: Manuela [place] [address] / Plan: DonnieAlthea [employer] [employer] - [place] PPO / Product Type: *No Product type* /   Insurance ID Number:    Payor/Plan Subscr  Sex Relation Sub. Ins. ID Effective Group Num   1.  [address] 1965 Male Self [id number] 21 [id number]                                    [id number]         DIAGNOSIS & PROCEDURE:                       Procedure/Operation: EGD/colonoscopy           CPT Code: 72316/29683    Diagnosis:  Rt upper quadrant pain/abdominal pain    ICD10 Code: R10.11/R10.9    Location:  [facility]    Surgeon:  Dr Berta Pendleton INFORMATION:                          Date: 21    Time: 11:00 AM              Anesthesia:  [facility]                                                       Status:  Outpatient        Special Comments:         Electronically signed by Clayton Guerrero MA on 2021 at 7:48 AM

## 2021-12-03 ENCOUNTER — TELEPHONE (OUTPATIENT)
Dept: SURGERY | Age: 56
End: 2021-12-03

## 2021-12-03 NOTE — TELEPHONE ENCOUNTER
Received a call from the patient who stated that he will keep the procedure on 12/30/21 for EGD and colonoscopy. But if there is any last minutes cancellation, he wants to move up the procedure. The pt stated he was originally going to [place] on 12/30/21. Forwarded message to Fernando Burden MA for informational purposes.   Electronically signed by Kleber Rodriguez on 12/3/2021 at 11:47 AM

## 2021-12-09 DIAGNOSIS — R93.5 ABNORMAL US (ULTRASOUND) OF ABDOMEN: Primary | ICD-10-CM

## 2021-12-10 ENCOUNTER — OFFICE VISIT (OUTPATIENT)
Dept: ENT CLINIC | Age: 56
End: 2021-12-10
Payer: COMMERCIAL

## 2021-12-10 VITALS — BODY MASS INDEX: 27.17 KG/M2 | HEIGHT: 73 IN | WEIGHT: 205 LBS

## 2021-12-10 DIAGNOSIS — H61.23 BILATERAL IMPACTED CERUMEN: ICD-10-CM

## 2021-12-10 DIAGNOSIS — H60.8X3 CHRONIC ECZEMATOUS OTITIS EXTERNA OF BOTH EARS: Primary | ICD-10-CM

## 2021-12-10 DIAGNOSIS — H92.03 OTALGIA, BILATERAL: ICD-10-CM

## 2021-12-10 PROCEDURE — 99213 OFFICE O/P EST LOW 20 MIN: CPT | Performed by: NURSE PRACTITIONER

## 2021-12-10 NOTE — PROGRESS NOTES
[facility] Otolaryngology  Dr. Holly Toney. Shana Whatley. Ms.Ed        Patient Name:  Diana Poster  :  1965     CHIEF C/O:    Chief Complaint   Patient presents with    Follow-up     states that his ears still feel the same and that there hasnt been any improvement. drainage in both ears seems to be increased in the morning. HISTORY OBTAINED FROM:  patient    HISTORY OF PRESENT ILLNESS:       Kathy Arreguin is a 61y.o. year old male, here today for follow up of ear drainage. Started on kenalog last appt  No changes, continues to have clear drainage from ears, worse in morning  Pain in ears if water gets in, uses his cortisporin drops and resolves  Steroid did improve numbness in right neck, less noticeable/severe  No other current ear pain  Denies muffled hearing or ringing in either ear  No dizziness.           Past Medical History:   Diagnosis Date    ADHD (attention deficit hyperactivity disorder)     Bilateral carotid artery stenosis 2019    CAD (coronary artery disease)     Carotid artery stenosis     COPD (chronic obstructive pulmonary disease) (HCC)     Erectile dysfunction     Esophageal erosions     Essential hypertension 2019    Folate deficiency     Gastroesophageal reflux disease without esophagitis 2019    HSV infection     Hyperlipidemia     Hypertension     Liver disease     fatty liver    Other hyperlipidemia 2019    Prostatitis     BRYCE (renal artery stenosis) ([address]     right-stent    Right lateral epicondylitis 2021    Simple chronic bronchitis ([address] 2019    Vitamin B 12 deficiency      Past Surgical History:   Procedure Laterality Date    BACK SURGERY      ruptured disc    CARDIAC CATHETERIZATION      CAROTID ENDARTERECTOMY  10/2018    COLONOSCOPY      KNEE ARTHROPLASTY      KNEE ARTHROSCOPY Left     UPPER GASTROINTESTINAL ENDOSCOPY         Current Outpatient Medications:     dicyclomine (BENTYL) 10 MG capsule, Take 1 capsule by mouth 4 times daily, Disp: 120 capsule, Rfl: 3    simvastatin (ZOCOR) 20 MG tablet, Take 1 tablet by mouth nightly, Disp: 90 tablet, Rfl: 1    tadalafil (CIALIS) 5 MG tablet, Take 1 tablet by mouth daily, Disp: 90 tablet, Rfl: 1    pantoprazole (PROTONIX) 20 MG tablet, Take 1 tablet by mouth every morning (before breakfast), Disp: 30 tablet, Rfl: 5    triamcinolone (KENALOG) 0.025 % cream, Apply to bilateral ear canals 2-3 times weekly for itching, stop when symptoms resolved., Disp: 1 each, Rfl: 1    azelastine (ASTELIN) 0.1 % nasal spray, 1-2 sprays by Nasal route 2 times daily as needed for Rhinitis Use in each nostril as directed, Disp: 30 mL, Rfl: 1    neomycin-polymyxin-hydrocortisone (CORTISPORIN) 3.5-78717-7 otic solution, Place 4 drops into both ears 3 times daily, Disp: 1 Bottle, Rfl: 0    fluticasone (FLONASE) 50 MCG/ACT nasal spray, 2 sprays by Each Nostril route daily, Disp: 1 Bottle, Rfl: 0    aspirin 81 MG EC tablet, Take 81 mg by mouth daily, Disp: , Rfl:     Coenzyme Q10 (CO Q-10) 100 MG CAPS, Take 1 capsule by mouth daily, Disp: 90 capsule, Rfl: 1    clonazePAM (KLONOPIN) 1 MG tablet, Take 1 tablet by mouth daily as needed for Anxiety for up to 30 days. , Disp: 30 tablet, Rfl: 1  Patient has no known allergies. Social History     Tobacco Use    Smoking status: Former Smoker     Packs/day: 1.50     Years: 37.00     Pack years: 55.50     Types: Cigars, Cigarettes     Quit date: 2020     Years since quittin.8    Smokeless tobacco: Never Used   Substance Use Topics    Alcohol use: Yes     Alcohol/week: 3.0 standard drinks     Types: 3 Glasses of wine per week    Drug use: No     Family History   Problem Relation Age of Onset    Diabetes Mother     Stroke Mother     Alcohol Abuse Father        Review of Systems   Constitutional: Negative. Negative for activity change and appetite change. HENT: Positive for ear discharge and rhinorrhea.  Negative for congestion, ear pain, postnasal drip, sinus pressure and sinus pain. Itching of bilateral ears   Eyes: Negative. Respiratory: Negative. Negative for shortness of breath and stridor. Cardiovascular: Negative. Negative for chest pain and palpitations. Endocrine: Negative. Musculoskeletal: Negative. Skin: Negative. Neurological: Positive for numbness (Left neck -decreased). Negative for dizziness. Hematological: Negative. Psychiatric/Behavioral: Negative. Ht 6' 1\" (1.854 m)   Wt 205 lb (93 kg)   BMI 27.05 kg/m²   Physical Exam  Constitutional:       Appearance: Normal appearance. HENT:      Head: Normocephalic. Jaw: There is normal jaw occlusion. No tenderness. Right Ear: Tympanic membrane, ear canal and external ear normal.      Left Ear: Tympanic membrane, ear canal and external ear normal.      Ears:        Comments: No drainage or fissures in either ear canal.     Nose: Rhinorrhea present. Rhinorrhea is clear. Right Turbinates: Not swollen or pale. Left Turbinates: Not swollen or pale. Mouth/Throat:      Lips: Pink. Dentition: Has dentures. Pharynx: Oropharynx is clear. Eyes:      Conjunctiva/sclera: Conjunctivae normal.      Pupils: Pupils are equal, round, and reactive to light. Neck:     Cardiovascular:      Rate and Rhythm: Normal rate and regular rhythm. Pulses: Normal pulses. Pulmonary:      Effort: Pulmonary effort is normal. No respiratory distress. Breath sounds: No stridor. Musculoskeletal:         General: Normal range of motion. Cervical back: Normal range of motion. No rigidity. No muscular tenderness. Skin:     General: Skin is warm and dry. Neurological:      General: No focal deficit present. Mental Status: He is alert and oriented to person, place, and time. Psychiatric:         Mood and Affect: Mood normal.         Behavior: Behavior normal.         Thought Content:  Thought content normal.         Judgment: Judgment normal.         IMPRESSION/PLAN:    Madeline Borges was seen today for follow-up. Diagnoses and all orders for this visit:    Chronic eczematous otitis externa of both ears    Bilateral impacted cerumen  -     neomycin-polymyxin-hydrocortisone (CORTISPORIN) 3.5-91833-3 otic solution; Place 4 drops into both ears 3 times daily Use as needed for ear pain    Otalgia, bilateral  -     neomycin-polymyxin-hydrocortisone (CORTISPORIN) 3.5-54574-8 otic solution; Place 4 drops into both ears 3 times daily Use as needed for ear pain      Cortisporin drops are refilled for patient to be used when pain or drainage is present from either ear. Small amount of nonobstructing cerumen removed from the left ear canal without difficulty. Patient will follow up in 6 months. He is instructed to call with any new or worsening symptoms prior to his next appointment.         Elease Bosworth, MSN, FNP-C  [facility], Nose and Throat    The information contained in this note has been dictated using drug and medical speech recognition software and may contain errors

## 2021-12-16 DIAGNOSIS — K76.9 LIVER LESION: Primary | ICD-10-CM

## 2021-12-17 ENCOUNTER — TELEPHONE (OUTPATIENT)
Dept: SURGERY | Age: 56
End: 2021-12-17

## 2021-12-17 NOTE — TELEPHONE ENCOUNTER
MA called and spoke with Whitney Hartley at [facility] to [employer] for cpt code 41869, CTA triphasic liver. Whitney Hartley states that patient does not meet criteria for this test.  MA informed Dr Mame Jesus that a peer to peer or fax clinicals can be done. Dr Mame Jesus orders MRI abdomen with and without contrast MRCP. MA informed patient.  Ref #[id number]  Electronically signed by Darren Cleveland MA on 12/17/2021 at 3:06 PM

## 2021-12-20 ENCOUNTER — TELEPHONE (OUTPATIENT)
Dept: SURGERY | Age: 56
End: 2021-12-20

## 2021-12-20 ASSESSMENT — ENCOUNTER SYMPTOMS
SHORTNESS OF BREATH: 0
RHINORRHEA: 1
RESPIRATORY NEGATIVE: 1
SINUS PAIN: 0
STRIDOR: 0
SINUS PRESSURE: 0
EYES NEGATIVE: 1

## 2021-12-20 NOTE — TELEPHONE ENCOUNTER
MA faxed referral over to [facility] at [phone]. Also let patient know that the referral was placed.     Electronically signed by Candido Michelle on 12/20/2021 at 2:27 PM

## 2021-12-20 NOTE — TELEPHONE ENCOUNTER
MA spoke with Vinnie Mittal at [facility] who stated that no prior auth or predetermination is required for procedure code 30251, MRI abdomen with/without contrast MRCP).   MA informed Vinnie Mittal that MRI will be done at [facility]  Ref # [id number]  Electronically signed by Bryce Leroy MA on 12/20/2021 at 2:20 PM

## 2021-12-22 ENCOUNTER — OFFICE VISIT (OUTPATIENT)
Dept: ORTHOPEDIC SURGERY | Age: 56
End: 2021-12-22
Payer: COMMERCIAL

## 2021-12-22 VITALS — WEIGHT: 205 LBS | BODY MASS INDEX: 27.17 KG/M2 | RESPIRATION RATE: 18 BRPM | HEIGHT: 73 IN

## 2021-12-22 DIAGNOSIS — M77.8 LEFT ELBOW TENDONITIS: ICD-10-CM

## 2021-12-22 DIAGNOSIS — G56.01 RIGHT CARPAL TUNNEL SYNDROME: Primary | ICD-10-CM

## 2021-12-22 PROCEDURE — 20550 NJX 1 TENDON SHEATH/LIGAMENT: CPT | Performed by: PHYSICIAN ASSISTANT

## 2021-12-22 PROCEDURE — 99214 OFFICE O/P EST MOD 30 MIN: CPT | Performed by: PHYSICIAN ASSISTANT

## 2021-12-22 PROCEDURE — 20526 THER INJECTION CARP TUNNEL: CPT | Performed by: PHYSICIAN ASSISTANT

## 2021-12-22 RX ORDER — BETAMETHASONE SODIUM PHOSPHATE AND BETAMETHASONE ACETATE 3; 3 MG/ML; MG/ML
12 INJECTION, SUSPENSION INTRA-ARTICULAR; INTRALESIONAL; INTRAMUSCULAR; SOFT TISSUE ONCE
Status: COMPLETED | OUTPATIENT
Start: 2021-12-22 | End: 2021-12-22

## 2021-12-22 RX ADMIN — BETAMETHASONE SODIUM PHOSPHATE AND BETAMETHASONE ACETATE 12 MG: 3; 3 INJECTION, SUSPENSION INTRA-ARTICULAR; INTRALESIONAL; INTRAMUSCULAR; SOFT TISSUE at 15:15

## 2021-12-22 NOTE — PROGRESS NOTES
Left elbow pain and right hand numbness and tingling      Stephanie Matthews is a 61y.o. year old  who presents for follow up for bilateral upper extremity pain. He has previously been treated for lateral epicondylitis bilaterally. He reports good results with injections in the past.    He does complain of diffuse upper extremity pain radiating from his elbow into his shoulder as well as distally into his hand. Does complain of some numbness and tingling about the hand. Positive nocturnal symptoms on the right. Patient denies any injury. He does report previously he had a right carpal tunnel cortisone injection. He states this did provide him a lot of relief until last week. He states the injection took care of all of his pain on the right until last week. His pain to the right elbow resolved. He does have a recurrence of left lateral elbow pain over the last couple of months. This is worse with any use or activity. Pain is better with rest.  Patient is requesting cortisone injections to the right wrist and left elbow at today's visit. He is leaving for [place] for the winter. Patient had an EMG/NCS dated 5/27/21    Right median nerve motor latency: 5.10  Right ulnar nerve velocity: above 50  Right median nerve sensory latency: 3.54       EMG portion of the exam demonstrates changes to the right flexor pollicis longus, right biceps brachii, right triceps brachii, right deltoid. This is consistent with chronic right carpal tunnel syndrome mild in degree and chronic right cervical radiculopathy which is moderate to severe in degree.         Past Medical History:   Diagnosis Date    ADHD (attention deficit hyperactivity disorder)     Bilateral carotid artery stenosis 5/29/2019    CAD (coronary artery disease)     Carotid artery stenosis     [facility]) ([facility]     Erectile dysfunction     Esophageal erosions     Essential hypertension 5/29/2019    Folate deficiency     Gastroesophageal reflux disease without esophagitis 5/29/2019    HSV infection     Hyperlipidemia     Hypertension     Liver disease     fatty liver    Other hyperlipidemia 5/29/2019    Prostatitis     BRYCE (renal artery stenosis) (HCC)     right-stent    Right lateral epicondylitis 5/27/2021    Simple chronic bronchitis ([address] 5/29/2019    Vitamin B 12 deficiency      Past Surgical History:   Procedure Laterality Date    BACK SURGERY      ruptured disc    CARDIAC CATHETERIZATION      CAROTID ENDARTERECTOMY  10/2018    COLONOSCOPY      KNEE ARTHROPLASTY      KNEE ARTHROSCOPY Left     UPPER GASTROINTESTINAL ENDOSCOPY         Current Outpatient Medications:     neomycin-polymyxin-hydrocortisone (CORTISPORIN) 3.5-79034-1 otic solution, Place 4 drops into both ears 3 times daily Use as needed for ear pain, Disp: 1 each, Rfl: 2    dicyclomine (BENTYL) 10 MG capsule, Take 1 capsule by mouth 4 times daily, Disp: 120 capsule, Rfl: 3    simvastatin (ZOCOR) 20 MG tablet, Take 1 tablet by mouth nightly, Disp: 90 tablet, Rfl: 1    tadalafil (CIALIS) 5 MG tablet, Take 1 tablet by mouth daily, Disp: 90 tablet, Rfl: 1    pantoprazole (PROTONIX) 20 MG tablet, Take 1 tablet by mouth every morning (before breakfast), Disp: 30 tablet, Rfl: 5    triamcinolone (KENALOG) 0.025 % cream, Apply to bilateral ear canals 2-3 times weekly for itching, stop when symptoms resolved., Disp: 1 each, Rfl: 1    azelastine (ASTELIN) 0.1 % nasal spray, 1-2 sprays by Nasal route 2 times daily as needed for Rhinitis Use in each nostril as directed, Disp: 30 mL, Rfl: 1    fluticasone (FLONASE) 50 MCG/ACT nasal spray, 2 sprays by Each Nostril route daily, Disp: 1 Bottle, Rfl: 0    aspirin 81 MG EC tablet, Take 81 mg by mouth daily, Disp: , Rfl:     Coenzyme Q10 (CO Q-10) 100 MG CAPS, Take 1 capsule by mouth daily, Disp: 90 capsule, Rfl: 1    clonazePAM (KLONOPIN) 1 MG tablet, Take 1 tablet by mouth daily as needed for Anxiety for up to 30 days. , Disp: 30 tablet, Rfl: 1  No Known Allergies  Social History     Socioeconomic History    Marital status:      Spouse name: Not on file    Number of children: Not on file    Years of education: Not on file    Highest education level: Not on file   Occupational History    Not on file   Tobacco Use    Smoking status: Former Smoker     Packs/day: 1.50     Years: 37.00     Pack years: 55.50     Types: Cigars, Cigarettes     Quit date: 2020     Years since quittin.8    Smokeless tobacco: Never Used   Substance and Sexual Activity    Alcohol use: Yes     Alcohol/week: 3.0 standard drinks     Types: 3 Glasses of wine per week    Drug use: No    Sexual activity: Not on file   Other Topics Concern    Not on file   Social History Narrative    Not on file     Social Determinants of Health     Financial Resource Strain: Low Risk     Difficulty of Paying Living Expenses: Not hard at all   Food Insecurity: No Food Insecurity    Worried About Running Out of Food in the Last Year: Never true    [address] in the Last Year: Never true   Transportation Needs:     Lack of Transportation (Medical): Not on file    Lack of Transportation (Non-Medical):  Not on file   Physical Activity:     Days of Exercise per Week: Not on file    Minutes of Exercise per Session: Not on file   Stress:     Feeling of Stress : Not on file   Social Connections:     Frequency of Communication with Friends and Family: Not on file    Frequency of Social Gatherings with Friends and Family: Not on file    Attends [employer] Services: Not on file    Active Member of Clubs or Organizations: Not on file    Attends Club or Organization Meetings: Not on file    Marital Status: Not on file   Intimate Partner Violence:     Fear of Current or Ex-Partner: Not on file    Emotionally Abused: Not on file    Physically Abused: Not on file    Sexually Abused: Not on file   Housing Stability:     Unable to Pay for Housing in the Last Year: Not on file    Number of Places Lived in the Last Year: Not on file    Unstable Housing in the Last Year: Not on file     Family History   Problem Relation Age of Onset    Diabetes Mother     Stroke Mother     Alcohol Abuse Father        Skin: (-) rash,(-) psoriasis,(-) eczema, (-)skin cancer. Musculoskeletal: Chronic right upper extremity pain  Neurologic: numbness and tingling  Cardiovascular: (-) Chest pain, (-) swelling in legs/feet, (-) SOB, (-) cramping in legs/feet with walking. SUBJECTIVE:      Constitutional:    The patient is alert and oriented x 3, appears to be stated age and in no distress. Right upper extremity: negative tenderness over the lateral epicondyle. Negative tenderness over the medial epicondyle. Negative tenderness with resisted wrist extension. Hypersensitivity over the ulnar nerve positive Tinel's. Positive Tinel's and positive Mercy's maneuver at the wrist with numbness and tingling radiating into the thumb index and middle fingers. Full wrist flexion extension. Negative pain with resisted wrist extension. Radial, ulnar, median nerves grossly intact light touch. 2+ radial pulse. Left upper extremity: Non-tender about the shoulder with good ROM. Mild tenderness over the lateral epicondyle. Mild tenderness with resisted wrist extension. Negative tenderness over the medial epicondyle.  + tinels of the cubital tunnel, - tinels of the carpal tunnel, - durkans, - finkelsteins, - CMC grind, - tenderness over the A1 pulleys with no active triggering. Full flexion and extension of the fingers. - wartenbergs and cross finger testing, APB strength 5/5 with no atrophy. Median, ulnar, radial n intact to light touch. Brisk capillary refill. Gross motor 5/5.          Impression:   Left lateral epicondylitis  Right double crush syndrome with mild carpal tunnel and cervical radiculopathy C5-C6, C7  ADHD, CAD, COPD  GERD  Peripheral vascular disease    Plan:     Discussed findings with patient. Discussed conservative and surgical management with patient. Patient is happy with his response to his right carpal tunnel cortisone injection previously. He would like this repeated today. He is uninterested in any type of surgical management. He is getting ready to leave for [place] for the winter. He is also requested injection to his left elbow. He states his symptoms have previously responded well to cortisone injections to the left elbow. He does have a home exercise program and counterforce bracing that he will use as needed. Patient follow-up every 3 months as needed for cortisone injections. All questions answered. Procedure Note Cortisone Injection for Elbow Epicondylitis    The left lateral epicondyle was identified as the injection site. The risk and benefits of a cortisone injection were explained and the patient consented to the injection. Under sterile conditions, the epicondyle was injected with a mixture of 1 mL of 1% Lidocaine and 1 mL of 6 mg/mL Betamethasone without complication. A sterile bandage was applied. Procedure Note Carpal Tunnel Injection    The right carpal tunnel was identified as the injection site. The risk and benefits of a cortisone injection were explained and the patient consented to the injection. Under sterile conditions, the carpal canal was injected with a mixture of 1 mL of 1% Lidocaine and 1 mL of 6 mg/mL Betamethasone without complication. A sterile bandage was applied.

## 2021-12-23 ENCOUNTER — HOSPITAL ENCOUNTER (OUTPATIENT)
Age: 56
Discharge: HOME | End: 2021-12-25
Payer: COMMERCIAL

## 2021-12-23 DIAGNOSIS — Z01.818 PREOP TESTING: ICD-10-CM

## 2021-12-23 PROCEDURE — U0003 INFECTIOUS AGENT DETECTION BY NUCLEIC ACID (DNA OR RNA); SEVERE ACUTE RESPIRATORY SYNDROME CORONAVIRUS 2 (SARS-COV-2) (CORONAVIRUS DISEASE [COVID-19]), AMPLIFIED PROBE TECHNIQUE, MAKING USE OF HIGH THROUGHPUT TECHNOLOGIES AS DESCRIBED BY CMS-2020-01-R: HCPCS

## 2021-12-27 LAB
SARS-COV-2: NOT DETECTED
SOURCE: NORMAL

## 2021-12-30 ENCOUNTER — ANESTHESIA EVENT (OUTPATIENT)
Dept: ENDOSCOPY | Age: 56
End: 2021-12-30
Payer: COMMERCIAL

## 2021-12-30 ENCOUNTER — HOSPITAL ENCOUNTER (OUTPATIENT)
Age: 56
Setting detail: OUTPATIENT SURGERY
Discharge: HOME | End: 2021-12-30
Attending: SURGERY | Admitting: SURGERY
Payer: COMMERCIAL

## 2021-12-30 ENCOUNTER — ANESTHESIA (OUTPATIENT)
Dept: ENDOSCOPY | Age: 56
End: 2021-12-30
Payer: COMMERCIAL

## 2021-12-30 VITALS
DIASTOLIC BLOOD PRESSURE: 91 MMHG | HEART RATE: 63 BPM | WEIGHT: 205 LBS | OXYGEN SATURATION: 94 % | TEMPERATURE: 97.3 F | RESPIRATION RATE: 16 BRPM | SYSTOLIC BLOOD PRESSURE: 145 MMHG | BODY MASS INDEX: 27.17 KG/M2 | HEIGHT: 73 IN

## 2021-12-30 VITALS — OXYGEN SATURATION: 99 % | SYSTOLIC BLOOD PRESSURE: 121 MMHG | DIASTOLIC BLOOD PRESSURE: 68 MMHG

## 2021-12-30 DIAGNOSIS — Z01.818 PREOP TESTING: Primary | ICD-10-CM

## 2021-12-30 PROCEDURE — 3609010300 HC COLONOSCOPY W/BIOPSY SINGLE/MULTIPLE: Performed by: SURGERY

## 2021-12-30 PROCEDURE — 2580000003 HC RX 258: Performed by: NURSE ANESTHETIST, CERTIFIED REGISTERED

## 2021-12-30 PROCEDURE — 3700000000 HC ANESTHESIA ATTENDED CARE: Performed by: SURGERY

## 2021-12-30 PROCEDURE — 7100000010 HC PHASE II RECOVERY - FIRST 15 MIN: Performed by: SURGERY

## 2021-12-30 PROCEDURE — 3700000001 HC ADD 15 MINUTES (ANESTHESIA): Performed by: SURGERY

## 2021-12-30 PROCEDURE — 2709999900 HC NON-CHARGEABLE SUPPLY: Performed by: SURGERY

## 2021-12-30 PROCEDURE — 45380 COLONOSCOPY AND BIOPSY: CPT | Performed by: SURGERY

## 2021-12-30 PROCEDURE — 88305 TISSUE EXAM BY PATHOLOGIST: CPT

## 2021-12-30 PROCEDURE — 7100000011 HC PHASE II RECOVERY - ADDTL 15 MIN: Performed by: SURGERY

## 2021-12-30 PROCEDURE — 3609012400 HC EGD TRANSORAL BIOPSY SINGLE/MULTIPLE: Performed by: SURGERY

## 2021-12-30 PROCEDURE — 6360000002 HC RX W HCPCS: Performed by: NURSE ANESTHETIST, CERTIFIED REGISTERED

## 2021-12-30 PROCEDURE — 43239 EGD BIOPSY SINGLE/MULTIPLE: CPT | Performed by: SURGERY

## 2021-12-30 RX ORDER — PROPOFOL 10 MG/ML
INJECTION, EMULSION INTRAVENOUS PRN
Status: DISCONTINUED | OUTPATIENT
Start: 2021-12-30 | End: 2021-12-30 | Stop reason: SDUPTHER

## 2021-12-30 RX ORDER — SODIUM CHLORIDE 9 MG/ML
INJECTION, SOLUTION INTRAVENOUS CONTINUOUS PRN
Status: DISCONTINUED | OUTPATIENT
Start: 2021-12-30 | End: 2021-12-30 | Stop reason: SDUPTHER

## 2021-12-30 RX ADMIN — SODIUM CHLORIDE: 9 INJECTION, SOLUTION INTRAVENOUS at 10:06

## 2021-12-30 RX ADMIN — PROPOFOL 500 MG: 10 INJECTION, EMULSION INTRAVENOUS at 10:10

## 2021-12-30 ASSESSMENT — LIFESTYLE VARIABLES: SMOKING_STATUS: 1

## 2021-12-30 ASSESSMENT — ENCOUNTER SYMPTOMS: DYSPNEA ACTIVITY LEVEL: AFTER AMBULATING 2 FLIGHTS OF STAIRS

## 2021-12-30 ASSESSMENT — PAIN - FUNCTIONAL ASSESSMENT: PAIN_FUNCTIONAL_ASSESSMENT: 0-10

## 2021-12-30 ASSESSMENT — COPD QUESTIONNAIRES: CAT_SEVERITY: NO INTERVAL CHANGE

## 2021-12-30 NOTE — ANESTHESIA POSTPROCEDURE EVALUATION
Department of Anesthesiology  Postprocedure Note    Patient: Keely Nevarez  MRN: [id number]  YOB: 1965  Date of evaluation: 12/30/2021  Time:  10:47 AM     Procedure Summary     Date: 12/30/21 Room / Location: [facility] 03 / 106 [facility]    Anesthesia Start: 1006 Anesthesia Stop: 9159    Procedures:       COLONOSCOPY WITH BIOPSY (N/A )      EGD BIOPSY (N/A ) Diagnosis: (RIGHT UPPER QUADRANT PAIN GENERALIZED ABDOMINAL PAIN)    Surgeons: Aditya Beltre MD Responsible Provider: Musa Chacko DO    Anesthesia Type: MAC ASA Status: 3          Anesthesia Type: MAC    Lester Phase I: Lester Score: 10    Lester Phase II:      Last vitals: Reviewed and per EMR flowsheets.        Anesthesia Post Evaluation    Patient location during evaluation: bedside  Patient participation: complete - patient participated  Level of consciousness: awake and alert  Pain score: 0  Airway patency: patent  Nausea & Vomiting: no nausea and no vomiting  Complications: no  Cardiovascular status: blood pressure returned to baseline  Respiratory status: acceptable  Hydration status: euvolemic

## 2021-12-30 NOTE — H&P
[address] [facility] Surgery Clinic Note     Assessment/Plan:        Diagnosis Orders   1. RUQ pain  US GALLBLADDER RUQ     dicyclomine (BENTYL) 10 MG capsule     We will check an ultrasound. Recommended he continue his PPI. 2. Abdominal cramping-likely functional/IBS related  dicyclomine (BENTYL) 10 MG capsule     We will plan for upper and lower endoscopy. Trial Bentyl. Dietary modification discussed.            Return for Endoscopy.             Chief Complaint   Patient presents with    New Patient       ref from dr Benjamin Segovia for abdominal pain.  going on for months         PCP: Laya Marcial MD     HPI: Jose Anderson is a 61 y.o. male who presents in consultation for \"stomach problems. \"  He says he has had years of upper abdominal pain that is so severe his wife cannot even touch his stomach. He says he has been having emesis more so over the last few months as well. He has abdominal pain \"all the time. \"  He describes this as gas pain. Is worse lately. Is worse with food but nothing in particular. There are no other exacerbating or alleviating symptoms. He says he saw GI about 5 or 6 years ago. Sound like he had endoscopies for similar reasons and symptoms. He had a hiatal hernia noted. He also had diverticulosis. He says biopsies were also done but those are not available in our system. Complains of alternating runny and solid stools. Occasionally he will has some bright red blood if he goes multiple times of the day. There is no history of diabetes. He did just recently start Protonix.   His pain is only in the right upper quadrant currently today.        Past Medical History        Past Medical History:   Diagnosis Date    ADHD (attention deficit hyperactivity disorder)      Bilateral carotid artery stenosis 5/29/2019    CAD (coronary artery disease)      Carotid artery stenosis      COPD (chronic obstructive pulmonary disease) (HCC)      Erectile dysfunction      Esophageal erosions      Essential hypertension 5/29/2019    Folate deficiency      Gastroesophageal reflux disease without esophagitis 5/29/2019    HSV infection      Hyperlipidemia      Hypertension      Liver disease       fatty liver    Other hyperlipidemia 5/29/2019    Prostatitis      BRYCE (renal artery stenosis) (HCC)       right-stent    Right lateral epicondylitis 5/27/2021    Simple chronic bronchitis ([address] 5/29/2019    Vitamin B 12 deficiency              Past Surgical History         Past Surgical History:   Procedure Laterality Date    BACK SURGERY         ruptured disc    CARDIAC CATHETERIZATION        CAROTID ENDARTERECTOMY   10/2018    COLONOSCOPY        KNEE ARTHROPLASTY        KNEE ARTHROSCOPY Left      UPPER GASTROINTESTINAL ENDOSCOPY                Home Medications           Prior to Admission medications    Medication Sig Start Date End Date Taking? Authorizing Provider   dicyclomine (BENTYL) 10 MG capsule Take 1 capsule by mouth 4 times daily 11/26/21   Yes Betsy Mercedes MD   simvastatin (ZOCOR) 20 MG tablet Take 1 tablet by mouth nightly 10/27/21   Yes Ary Haines MD   pantoprazole (PROTONIX) 20 MG tablet Take 1 tablet by mouth every morning (before breakfast) 10/27/21   Yes Ary Haines MD   triamcinolone (KENALOG) 0.025 % cream Apply to bilateral ear canals 2-3 times weekly for itching, stop when symptoms resolved.  10/27/21   Yes DULCE Sheehan - CNP   azelastine (ASTELIN) 0.1 % nasal spray 1-2 sprays by Nasal route 2 times daily as needed for Rhinitis Use in each nostril as directed 9/23/21   Yes DULCE Sheehan CNP   neomycin-polymyxin-hydrocortisone (CORTISPORIN) 3.5-81377-6 otic solution Place 4 drops into both ears 3 times daily 8/11/21   Yes Teofilo Ramirez, DO   fluticasone (FLONASE) 50 MCG/ACT nasal spray 2 sprays by Each Nostril route daily 6/23/21   Yes Ary Haines MD   aspirin 81 MG EC tablet Take 81 mg by mouth daily     Yes Historical Provider, MD   Coenzyme Q10 (CO Q-10) 100 MG CAPS Take 1 capsule by mouth daily 21   Yes Andrew Enriquez MD   tadalafil (CIALIS) 5 MG tablet Take 1 tablet by mouth daily 10/27/21     Andrew Enriquez MD   clonazePAM (KLONOPIN) 1 MG tablet Take 1 tablet by mouth daily as needed for Anxiety for up to 30 days. 21   Andrew Enriquez MD            No Known Allergies     Social History   Social History            Socioeconomic History    Marital status:        Spouse name: Not on file    Number of children: Not on file    Years of education: Not on file    Highest education level: Not on file   Occupational History    Not on file   Tobacco Use    Smoking status: Former Smoker       Packs/day: 1.50       Years: 37.00       Pack years: 55.50       Types: Cigars, Cigarettes       Quit date: 2020       Years since quittin.7    Smokeless tobacco: Never Used   Substance and Sexual Activity    Alcohol use: Yes       Alcohol/week: 3.0 standard drinks       Types: 3 Glasses of wine per week    Drug use: No    Sexual activity: Not on file   Other Topics Concern    Not on file   Social History Narrative    Not on file      Social Determinants of Health          Financial Resource Strain: Low Risk     Difficulty of Paying Living Expenses: Not hard at all   Food Insecurity: No Food Insecurity    Worried About Running Out of Food in the Last Year: Never true    [address] in the Last Year: Never true   Transportation Needs:     Lack of Transportation (Medical): Not on file    Lack of Transportation (Non-Medical):  Not on file   Physical Activity:     Days of Exercise per Week: Not on file    Minutes of Exercise per Session: Not on file   Stress:     Feeling of Stress : Not on file   Social Connections:     Frequency of Communication with Friends and Family: Not on file    Frequency of Social Gatherings with Friends and Family: Not on file    Attends Sikh Services: Not on file   [employer] or Organizations: Not on file    Attends Club or Organization Meetings: Not on file    Marital Status: Not on file   Intimate Partner Violence:     Fear of Current or Ex-Partner: Not on file    Emotionally Abused: Not on file    Physically Abused: Not on file    Sexually Abused: Not on file   Housing Stability:     Unable to Pay for Housing in the Last Year: Not on file    Number of [place] in the Last Year: Not on file    Unstable Housing in the Last Year: Not on file            Family History         Family History   Problem Relation Age of Onset    Diabetes Mother      Stroke Mother      Alcohol Abuse Father              Review of Systems   All other systems reviewed and are negative.                  Objective:  Vitals       Vitals:     11/26/21 0956   BP: 124/75   Pulse: 71   Temp: 98.1 °F (36.7 °C)   SpO2: 97%   Weight: 205 lb 12.8 oz (93.4 kg)   Height: 6' 1\" (1.854 m)            Physical Exam  Constitutional:       General: He is not in acute distress. Appearance: He is not diaphoretic. HENT:      Head: Normocephalic and atraumatic. Eyes:      General:         Right eye: No discharge. Left eye: No discharge. Neck:      Trachea: No tracheal deviation. Cardiovascular:      Rate and Rhythm: Normal rate. Pulmonary:      Effort: Pulmonary effort is normal. No respiratory distress. Abdominal:      General: There is no distension. Palpations: Abdomen is soft. Tenderness: There is abdominal tenderness (Mild right upper quadrant). There is no guarding or rebound. Skin:     General: Skin is warm and dry. Neurological:      Mental Status: He is alert and oriented to person, place, and time.                      Deandre Feldman MD       NOTE: This report, in part or full,may have been transcribed using voice recognition software. Every effort was made to ensure accuracy; however, inadvertent computerized transcription errors may be present.  Please excuse any transcriptional grammatical or spelling errors that may have escaped my editorial review.     CC:  Clotilde Mccarty MD

## 2021-12-30 NOTE — OP NOTE
EGD/Colonoscopy Op Note    PATIENT: Marin Restrepo    DATE OF PROCEDURE: 12/30/2021     SURGEON: Deandre Feldman MD    PREOPERATIVE DIAGNOSIS: Right upper quadrant abdominal pain    POSTOPERATIVE DIAGNOSIS: Same, minimal duodenitis, colon polyp, diverticulosis    OPERATION: Procedure(s):  COLONOSCOPY WITH BIOPSY  EGD BIOPSY    ANESTHESIA: Local monitored anesthesia. ESTIMATED BLOOD LOSS: minimal    COMPLICATIONS: None. SPECIMENS:    ID Type Source Tests Collected by Time Destination   A : antral bx Tissue Stomach SURGICAL PATHOLOGY Deandre Feldman MD 12/30/2021 1017    B : duodenal bx Tissue Duodenum SURGICAL PATHOLOGY Deandre Feldman MD 12/30/2021 1018    C : bx rectosigmoid polyp Tissue Colon SURGICAL PATHOLOGY Deandre Feldman MD 12/30/2021 1036        HISTORY: The patient is a 61y.o. year old male with history of above preop diagnosis. I recommended esophagogastroduodenoscopy and colonoscopy with possible biopsy/polypectomy and I explained the risk, benefits, expected outcome, and alternatives to the procedure. Risks included but are not limited to bleeding, infection, respiratory distress, hypotension, and perforation. Patient understands and is in agreement. PROCEDURE: The patient was given IV conscious sedation per anesthesia. The patient was given supplemental oxygen by nasal cannula. The gastroscope was inserted orally and advanced under direct vision through the esophagus, through the stomach, through the pylorus, and into the duodenum. FINDINGS:    Duodenum: Mild duodenitis in the bulb status post biopsies. Also biopsies of normal duodenum to rule out sprue. Stomach: Normal status post biopsies to rule out H. pylori    Esophagus: normal    Larynx: not examined    The scope was removed and the patient tolerated the procedure well.        The colonoscope was inserted per rectum and advanced under direct vision to the cecum without difficulty, identified by appendiceal orifice and ileocecal valve. The prep was good so exam was adequate. FINDINGS:    CHEY: normal    Terminal Ileum: normal    Colon: There is diverticulosis. There are 2 diminutive polyps in the rectosigmoid status post forcep polypectomy. Rectum/Anus: examined in normal and retroflexed positions -grade 1 hemorrhoids. The colon was decompressed and the scope was removed. The withdraw time was approximately 10 minutes. The patient tolerated the procedure well. ASSESSMENT/PLAN:   1. Follow-up biopsies  2. Continue PPI  3. Fiber diet  4. Ultrasound showed irregular appearing liver as well as cholelithiasis. CTA triphasic of the liver was denied by insurance. Awaiting MRI. Further plans pending results of that. 5. Colorectal Cancer Screening - recommend repeat colonoscopy in 5 years (may change pending biopsy results). Sooner if issues/concerns.     Sen Yu MD  12/30/21  10:42 AM

## 2021-12-30 NOTE — ANESTHESIA PRE PROCEDURE
Department of Anesthesiology  Preprocedure Note       Name:  José Luis Carbone   Age:  61 y.o.  :  1965                                          MRN:  [id number]         Date:  2021      Surgeon: Kinga Pete):  Sánchez Mendiola MD    Procedure: Procedure(s):  COLONOSCOPY DIAGNOSTIC  EGD ESOPHAGOGASTRODUODENOSCOPY    Medications prior to admission:   Prior to Admission medications    Medication Sig Start Date End Date Taking? Authorizing Provider   neomycin-polymyxin-hydrocortisone (CORTISPORIN) 3.5-19880-6 otic solution Place 4 drops into both ears 3 times daily Use as needed for ear pain 12/10/21  Yes DULCE Gtz CNP   simvastatin (ZOCOR) 20 MG tablet Take 1 tablet by mouth nightly 10/27/21  Yes Elyse Smith MD   tadalafil (CIALIS) 5 MG tablet Take 1 tablet by mouth daily 10/27/21  Yes Elyse Smith MD   azelastine (ASTELIN) 0.1 % nasal spray 1-2 sprays by Nasal route 2 times daily as needed for Rhinitis Use in each nostril as directed 21  Yes DULCE Gtz CNP   fluticasone (FLONASE) 50 MCG/ACT nasal spray 2 sprays by Each Nostril route daily 21  Yes Elyse Smith MD   aspirin 81 MG EC tablet Take 81 mg by mouth daily   Yes Historical Provider, MD   Coenzyme Q10 (CO Q-10) 100 MG CAPS Take 1 capsule by mouth daily 21  Yes Elyse Smith MD   clonazePAM (KLONOPIN) 1 MG tablet Take 1 tablet by mouth daily as needed for Anxiety for up to 30 days. 21 Yes Elyse Smiht MD   pantoprazole (PROTONIX) 20 MG tablet Take 1 tablet by mouth every morning (before breakfast)  Patient not taking: Reported on 2021 10/27/21   Elyse Smith MD       Current medications:    No current facility-administered medications for this encounter.      Current Outpatient Medications   Medication Sig Dispense Refill    neomycin-polymyxin-hydrocortisone (CORTISPORIN) 3.5-66751-1 otic solution Place 4 drops into both ears 3 times daily Use as needed for ear pain 1 each 2    simvastatin (ZOCOR) 20 MG tablet Take 1 tablet by mouth nightly 90 tablet 1    tadalafil (CIALIS) 5 MG tablet Take 1 tablet by mouth daily 90 tablet 1    azelastine (ASTELIN) 0.1 % nasal spray 1-2 sprays by Nasal route 2 times daily as needed for Rhinitis Use in each nostril as directed 30 mL 1    fluticasone (FLONASE) 50 MCG/ACT nasal spray 2 sprays by Each Nostril route daily 1 Bottle 0    aspirin 81 MG EC tablet Take 81 mg by mouth daily      Coenzyme Q10 (CO Q-10) 100 MG CAPS Take 1 capsule by mouth daily 90 capsule 1    clonazePAM (KLONOPIN) 1 MG tablet Take 1 tablet by mouth daily as needed for Anxiety for up to 30 days.  30 tablet 1    pantoprazole (PROTONIX) 20 MG tablet Take 1 tablet by mouth every morning (before breakfast) (Patient not taking: Reported on 12/29/2021) 30 tablet 5       Allergies:  No Known Allergies    Problem List:    Patient Active Problem List   Diagnosis Code    Other hyperlipidemia E78.49    Essential hypertension I10    Bilateral carotid artery stenosis I65.23    Simple chronic bronchitis (HCC) J41.0    Gastroesophageal reflux disease without esophagitis K21.9    Right lateral epicondylitis M77.11    Bilateral impacted cerumen H61.23    Otalgia, bilateral H92.03       Past Medical History:        Diagnosis Date    Abdominal pain     for ECG and c-scope 12/30/21    ADHD (attention deficit hyperactivity disorder)     Bilateral carotid artery stenosis     follows with Dr Jasper Nuñez annually    COPD (chronic obstructive pulmonary disease) ([facility] [address]     Epicondylitis, lateral     Erectile dysfunction     Esophageal erosions     Fatty liver     Folate deficiency     Gastroesophageal reflux disease without esophagitis 5/29/2019    HSV infection     Hyperlipidemia     Hypertension     BRYCE (renal artery stenosis) ([facility]     right-stent    Vitamin B 12 deficiency        Past Surgical History:        Procedure Laterality Date    BACK SURGERY      ruptured disc    CARDIAC CATHETERIZATION      CAROTID ENDARTERECTOMY  10/2018    COLONOSCOPY      KNEE ARTHROPLASTY      KNEE ARTHROSCOPY Left     UPPER GASTROINTESTINAL ENDOSCOPY         Social History:    Social History     Tobacco Use    Smoking status: Former Smoker     Packs/day: 1.50     Years: 37.00     Pack years: 55.50     Types: Cigars, Cigarettes     Quit date: 2020     Years since quittin.8    Smokeless tobacco: Never Used   Substance Use Topics    Alcohol use: Yes     Alcohol/week: 3.0 standard drinks     Types: 3 Glasses of wine per week                                Counseling given: Not Answered      Vital Signs (Current):   Vitals:    21 1001   Weight: 205 lb (93 kg)   Height: 6' 1\" (1.854 m)                                              BP Readings from Last 3 Encounters:   21 124/75   10/27/21 130/80   10/27/21 120/76       NPO Status:                                                                                 BMI:   Wt Readings from Last 3 Encounters:   21 205 lb (93 kg)   12/10/21 205 lb (93 kg)   21 205 lb 12.8 oz (93.4 kg)     Body mass index is 27.05 kg/m². CBC:   Lab Results   Component Value Date    WBC 10.9 10/27/2021    RBC 4.78 10/27/2021    HGB 14.9 10/27/2021    HCT 44.6 10/27/2021    MCV 93.3 10/27/2021    RDW 13.4 10/27/2021     10/27/2021       CMP:   Lab Results   Component Value Date     10/27/2021    K 4.6 10/27/2021     10/27/2021    CO2 18 10/27/2021    BUN 13 10/27/2021    CREATININE 0.8 10/27/2021    CREATININE 0.9 04/15/2019    GFRAA >60 10/27/2021    LABGLOM >60 10/27/2021    GLUCOSE 113 10/27/2021    PROT 6.8 10/27/2021    CALCIUM 9.6 10/27/2021    BILITOT 0.4 10/27/2021    ALKPHOS 70 10/27/2021    AST 21 10/27/2021    ALT 22 10/27/2021       POC Tests: No results for input(s): POCGLU, POCNA, POCK, POCCL, POCBUN, POCHEMO, POCHCT in the last 72 hours. Coags: No results found for: PROTIME, INR, APTT    HCG (If Applicable):  No results found for: PREGTESTUR, PREGSERUM, HCG, HCGQUANT     ABGs: No results found for: PHART, PO2ART, UGT1BVB, PTF9YMM, BEART, C8PMOMXL     Type & Screen (If Applicable):  No results found for: LABABO, LABRH    Drug/Infectious Status (If Applicable):  No results found for: HIV, HEPCAB    COVID-19 Screening (If Applicable):   Lab Results   Component Value Date    COVID19 Not Detected 12/23/2021           Anesthesia Evaluation  Patient summary reviewed and Nursing notes reviewed no history of anesthetic complications:   Airway: Mallampati: III  TM distance: >3 FB   Neck ROM: full  Mouth opening: > = 3 FB Dental:    (+) edentulous      Pulmonary:   (+) COPD (Oxygen saturation 94% on RA): no interval change and mild,  decreased breath sounds,  current smoker (55 pyhx.)          Patient smoked on day of surgery. ROS comment: Seasonal allergies  Chronic simple bronchitis  PE comment: Coarse with prolonged expiratory phase Cardiovascular:  Exercise tolerance: good (>4 METS),   (+) hypertension: moderate, LEONG: after ambulating 2 flights of stairs and no interval change, murmur (Grade I), hyperlipidemia      ECG reviewed  Rhythm: regular  Rate: normal           Beta Blocker:  Not on Beta Blocker      ROS comment: Sinus  Rhythm   WITHIN NORMAL LIMITS     Neuro/Psych:   (+) TIA, psychiatric history (ADHD):depression/anxiety              ROS comment: HSV infection GI/Hepatic/Renal:   (+) GERD (Esophageal erosions): no interval change, liver disease (Fatty liver):,          ROS comment: Erectile dysfunction. Endo/Other:              Pt had no PAT visit        ROS comment: Folate deficiency  B12 derficiency Abdominal:         (-) obese       Vascular:           ROS comment: Bilateral carotid artery stenosis s/p CEA  Renal artery stenosis s/p right renal stent. Other Findings:           Anesthesia Plan      MAC     ASA 3       Induction: intravenous.       Anesthetic plan and risks discussed with patient. Plan discussed with CRNA. Sumeet Doherty DO   12/30/2021          Patient seen and chart reviewed. No interval change in history or exam.   Anesthesia plan discussed, risk/benefits addressed. Patient's concerns and questions answered.      DULCE Silva - CRNA  December 30, 2021  9:54 AM

## 2022-01-14 ENCOUNTER — OFFICE VISIT (OUTPATIENT)
Dept: SURGERY | Age: 57
End: 2022-01-14
Payer: COMMERCIAL

## 2022-01-14 VITALS
SYSTOLIC BLOOD PRESSURE: 133 MMHG | BODY MASS INDEX: 27.17 KG/M2 | DIASTOLIC BLOOD PRESSURE: 80 MMHG | TEMPERATURE: 97.4 F | WEIGHT: 205 LBS | OXYGEN SATURATION: 97 % | HEART RATE: 77 BPM | RESPIRATION RATE: 16 BRPM | HEIGHT: 73 IN

## 2022-01-14 DIAGNOSIS — K76.9 LIVER LESION: Primary | ICD-10-CM

## 2022-01-14 DIAGNOSIS — K29.80 DUODENITIS: ICD-10-CM

## 2022-01-14 DIAGNOSIS — R10.11 RUQ PAIN: ICD-10-CM

## 2022-01-14 PROCEDURE — 99213 OFFICE O/P EST LOW 20 MIN: CPT | Performed by: SURGERY

## 2022-01-14 NOTE — PROGRESS NOTES
[facility] Surgery Clinic Note    Assessment/Plan:     Diagnosis Orders   1. Liver lesion      Multiple simple cysts noted on MRI. 2. RUQ pain      Symptoms have overall improved. Continue current. Does have cholelithiasis. Will monitor   3. Duodenitis      Continue PPI       Return in about 3 months (around 4/14/2022). Chief Complaint   Patient presents with    Results     MRI Reuslt, 2 week follow up EGD/Colonoscopy       PCP: Ludin Mack MD    HPI: Edwina Reyes is a 61 y.o. male here for follow-up of EGD and colonoscopy. He was found to have duodenitis. He had a hyperplastic polyp removed. He is also found to have diverticulosis. His epigastric upper quadrant pain have overall improved. He is not taking his PPI or Bentyl. He does have less pain and discomfort. He has been eating better. He has no emesis. He says the symptoms improved once distress improved at work. His bowels are moving regularly. His ultrasound showed cholelithiasis and liver mass with  follow-up MRI showed multiple simple appearing small hepatic cysts. Review of Systems   All other systems reviewed and are negative. The remainder of the past medical, past surgical, family, and psychosocial history, as well as medication and allergy review, were completed and are as documented elsewhere in the chart. Objective:  Vitals:    01/14/22 0835   BP: 133/80   Pulse: 77   Resp: 16   Temp: 97.4 °F (36.3 °C)   TempSrc: Infrared   SpO2: 97%   Weight: 205 lb (93 kg)   Height: 6' 1\" (1.854 m)          Physical Exam  Constitutional:       General: He is not in acute distress. Appearance: He is not diaphoretic. HENT:      Head: Normocephalic and atraumatic. Eyes:      General:         Right eye: No discharge. Left eye: No discharge. Neck:      Trachea: No tracheal deviation. Cardiovascular:      Rate and Rhythm: Normal rate.    Pulmonary:      Effort: Pulmonary effort is normal. No respiratory distress. Abdominal:      General: There is no distension. Palpations: Abdomen is soft. Tenderness: There is no abdominal tenderness. There is no guarding or rebound. Skin:     General: Skin is warm and dry. Neurological:      Mental Status: He is alert and oriented to person, place, and time. Magnus Lopez MD      NOTE: This report, in part or full, may have been transcribed using voice recognition software. Every effort was made to ensure accuracy; however, inadvertent computerized transcription errors may be present. Please excuse any transcriptional grammatical or spelling errors that may have escaped my editorial review.       CC: Vivian Mendoza MD

## 2022-01-21 ENCOUNTER — TELEPHONE (OUTPATIENT)
Dept: SURGERY | Age: 57
End: 2022-01-21

## 2022-01-21 NOTE — TELEPHONE ENCOUNTER
MA spoke to patient regarding MRI results. Patient has cyst that are unchanged. Doctor wants patient to follow up in 3 months. Patient will call back to set up appointment.     Electronically signed by Gregory Reardon on 1/21/2022 at 12:01 PM

## 2022-01-21 NOTE — TELEPHONE ENCOUNTER
MA called patient to let him know that I spoke with Dr. Sol Likes regarding his MRI results. Let doctor know this results where scanned in,but  Did not have the disc with him at the [place] office. Let patient know I will try and get back to him today but we have clinic in the morning and doctor has surgery so it would be sometime in between of all that.     Electronically signed by Ely Cormier on 1/21/2022 at 7:57 AM

## 2022-06-21 ENCOUNTER — TELEPHONE (OUTPATIENT)
Dept: FAMILY MEDICINE CLINIC | Age: 57
End: 2022-06-21

## 2022-06-21 NOTE — TELEPHONE ENCOUNTER
He needs to be seen. Can go through express care. Pulse ox is very low. Should have chest x-ray and evaluation.

## 2022-06-21 NOTE — TELEPHONE ENCOUNTER
----- Message from Mell Leyva sent at 6/21/2022  8:38 AM EDT -----  Subject: Message to Provider    QUESTIONS  Information for Provider? Patient was DX with Covid. Would like to know if   there is something Dr. Lorena Hatfield can prescribe for him. Please call patient   back and advise. Thanks.  ---------------------------------------------------------------------------  --------------  Nathanael REYES  What is the best way for the office to contact you? OK to leave message on   voicemail  Preferred Call Back Phone Number? [phone]  ---------------------------------------------------------------------------  --------------  SCRIPT ANSWERS  Relationship to Patient?  Self In my judgment no risk for PPH has been identified at this time.

## 2022-06-21 NOTE — TELEPHONE ENCOUNTER
89, 90, 92 this AM for Pulse Ox. Symptoms started yesterday, home test was last night. Only symptom he is having is body aches. He is unvaccinated.

## 2022-06-21 NOTE — TELEPHONE ENCOUNTER
Will Adalid tested positive for Covid at home last night. His only symptom was shivering last night and feeling achy. He is breathing well and has a pulse ox in the 90's. He is asking if you think he needs to be put on a steroid?

## 2022-06-21 NOTE — TELEPHONE ENCOUNTER
When did symptoms start and what other symptoms does he have he is potentially candidate for paxlovid treatment. Where in the 90s is his O2 saturation?

## 2022-06-27 ENCOUNTER — OFFICE VISIT (OUTPATIENT)
Dept: FAMILY MEDICINE CLINIC | Age: 57
End: 2022-06-27
Payer: COMMERCIAL

## 2022-06-27 VITALS
OXYGEN SATURATION: 96 % | DIASTOLIC BLOOD PRESSURE: 84 MMHG | SYSTOLIC BLOOD PRESSURE: 136 MMHG | RESPIRATION RATE: 18 BRPM | WEIGHT: 206.4 LBS | BODY MASS INDEX: 27.35 KG/M2 | TEMPERATURE: 97.8 F | HEIGHT: 73 IN | HEART RATE: 64 BPM

## 2022-06-27 DIAGNOSIS — U07.1 COVID-19: Primary | ICD-10-CM

## 2022-06-27 PROCEDURE — 99213 OFFICE O/P EST LOW 20 MIN: CPT

## 2022-06-27 RX ORDER — ONDANSETRON 4 MG/1
4 TABLET, ORALLY DISINTEGRATING ORAL 3 TIMES DAILY PRN
Qty: 21 TABLET | Refills: 0 | Status: SHIPPED | OUTPATIENT
Start: 2022-06-27

## 2022-06-27 RX ORDER — METHYLPREDNISOLONE 4 MG/1
TABLET ORAL
Qty: 1 KIT | Refills: 0 | Status: SHIPPED
Start: 2022-06-27 | End: 2022-08-22

## 2022-06-27 NOTE — PROGRESS NOTES
Chief Complaint       Post-COVID Symptoms (patient tested positive for covid on 6/21/22. . states that he is feeling much worse than before. .. his only symptom that he is still having that is bothersome are body aches. . no cough, no sore throat. [place] Hilt )    History of Present Illness   Source of history provided by:  patient. Phill Iyer is a 61 y.o. old male presenting to the walk in clinic for evaluation of body aches and nausea x 7 days. He tested positive 7 days ago and states the cough and sore throat have been improving. He has monitored pulse ox at home. Denies any fever, chills, CP, dyspnea, LE edema, abdominal pain, vomiting, rash, or lethargy. Denies any hx of asthma or COPD. Patient has not been vaccinated for COVID-19. Patient has been taking Ibuprofen and Tylenol OTC without symptomatic relief. ROS    Unless otherwise stated in this report or unable to obtain because of the patient's clinical or mental status as evidenced by the medical record, this patients's positive and negative responses for Review of Systems, constitutional, psych, eyes, ENT, cardiovascular, respiratory, gastrointestinal, neurological, genitourinary, musculoskeletal, integument systems and systems related to the presenting problem are either stated in the preceding or were not pertinent or were negative for the symptoms and/or complaints related to the medical problem. Physical Exam         VS:  /84   Pulse 64   Temp 97.8 °F (36.6 °C)   Resp 18   Ht 6' 1\" (1.854 m)   Wt 206 lb 6.4 oz (93.6 kg)   SpO2 96%   BMI 27.23 kg/m²    Oxygen Saturation Interpretation: Normal.    Constitutional:  Alert, development consistent with age. NAD. Head:  NC/NT. Airway patent. Mouth: Posterior pharynx with mild erythema and clear postnasal drip. No tonsillar hypertrophy or exudate. Neck:  Normal ROM. Supple. No anterior cervical adenopathy noted. Lungs: CTAB without wheezes, rales, or rhonchi.    CV:  Regular rate and rhythm, normal heart sounds, without pathological murmurs, ectopy, gallops, or rubs. Skin:  Normal turgor. Warm, dry, without visible rash. Lymphatic: No lymphangitis or adenopathy noted. Neurological:  Oriented. Motor functions intact. Lab / Imaging Results   (All laboratory and radiology results have been personally reviewed by myself)  Labs:  No results found for this visit on 06/27/22. Imaging: All Radiology results interpreted by Radiologist unless otherwise noted. Assessment / Plan     Impression(s):  Audra Leon was seen today for post-covid symptoms. Diagnoses and all orders for this visit:    COVID-19  -     methylPREDNISolone (MEDROL DOSEPACK) 4 MG tablet; Take by mouth.  -     ondansetron (ZOFRAN ODT) 4 MG disintegrating tablet; Take 1 tablet by mouth 3 times daily as needed for Nausea or Vomiting      Disposition:  Disposition: Discharge to home. Offered and recommended chest XR, he refused and states he no longer has cough or SOB. Worse symptom is the body aches. Pulse ox 96%, NAD. Vitals stable. Script written for medrol dose pack and Zofran, side effects discussed. Pt should also be fever free for 24 hours and symptoms should be improved overall prior to returning. Increase fluids and rest. Symptomatic relief discussed including Tylenol prn pain/fever. Schedule f/u with PCP in 7-10 days if symptoms persist. ED sooner if symptoms worsen or change. ED immediately with high or refractory fever, progressive SOB, dyspnea, CP, calf pain/swelling, shaking chills, vomiting, abdominal pain, lethargy, flank pain, or decreased urinary output. Pt verbalizes understanding and is in agreement with plan of care. All questions answered. JORGE Navas    **This report was transcribed using voice recognition software. Every effort was made to ensure accuracy; however, inadvertent computerized transcription errors may be present.

## 2022-07-12 NOTE — TELEPHONE ENCOUNTER
88W pmh A- fib on Eliquis, previous cardioversion 2019, chronic HFpEF is admitted with Afib/RVR.    Patient was sent from her PCP clinic with afib RVR.  She states she went to the PCP for elevated HR that was discovered during home HR monitoring.  She was asymptomatic.       In ED she was given diltiazem boluses and drip and did not respond.  She was subsequently started on amiodarone again without response.  She has a history of being intolerant to B-blockers.    She does not have chest pain, SOB, jaw pain, LUE pain, abdominal pain, nausea, vomiting, diarrhea, fevers, sweats, chills.    BNP was 289 and troponin 0.086.   Echo is pending.       Make sure patient is taking a baby aspirin and add that to his list please.   I do not see it

## 2022-07-20 ENCOUNTER — OFFICE VISIT (OUTPATIENT)
Dept: ENT CLINIC | Age: 57
End: 2022-07-20
Payer: COMMERCIAL

## 2022-07-20 VITALS
SYSTOLIC BLOOD PRESSURE: 132 MMHG | HEIGHT: 73 IN | BODY MASS INDEX: 27.3 KG/M2 | HEART RATE: 73 BPM | DIASTOLIC BLOOD PRESSURE: 87 MMHG | WEIGHT: 206 LBS

## 2022-07-20 DIAGNOSIS — H60.8X3 CHRONIC ECZEMATOUS OTITIS EXTERNA OF BOTH EARS: ICD-10-CM

## 2022-07-20 DIAGNOSIS — H92.03 OTALGIA, BILATERAL: Primary | ICD-10-CM

## 2022-07-20 DIAGNOSIS — H93.90 EAR LESION: ICD-10-CM

## 2022-07-20 PROCEDURE — 99213 OFFICE O/P EST LOW 20 MIN: CPT | Performed by: NURSE PRACTITIONER

## 2022-07-20 RX ORDER — FLUOCINOLONE ACETONIDE 0.11 MG/ML
2 OIL AURICULAR (OTIC) 2 TIMES DAILY PRN
Qty: 1 EACH | Refills: 2 | Status: SHIPPED | OUTPATIENT
Start: 2022-07-20

## 2022-07-20 ASSESSMENT — ENCOUNTER SYMPTOMS
STRIDOR: 0
EYES NEGATIVE: 1
SHORTNESS OF BREATH: 0
RESPIRATORY NEGATIVE: 1
SINUS PAIN: 0
RHINORRHEA: 0
SINUS PRESSURE: 0

## 2022-07-20 NOTE — PROGRESS NOTES
[facility] Otolaryngology  Dr. Era Singh. Irma Garcia. Ms.Ed        Patient Name:  Ramon Gilbert  :  1965     CHIEF C/O:    Chief Complaint   Patient presents with    Follow-up     F/U otitis externa       HISTORY OBTAINED FROM:  patient    HISTORY OF PRESENT ILLNESS:       Lucina Avila is a 61y.o. year old male, here today for follow up of ear drainage. Right neck pain numbness. Patient was last seen 7 months ago and states his symptoms have decreased to become more intermittent. He still continues to this date his ear pain is related to getting water in his ear which triggers the numbness and pain in the right side of his neck. He continues to use Cortisporin drops when this pain is present with normal resolution of symptoms within an hour. He also complains of a small scabbed area that is painful on his left ear. He denies any bleeding or drainage from the area. He denies any known trauma to the area. He denies any new changes to his hearing. He denies any new room spinning vertigo. He states he is scheduled for an ultrasound of his carotid arteries with follow-up with vascular surgery in 1 week.       Past Medical History:   Diagnosis Date    Abdominal pain     for ECG and c-scope 21    ADHD (attention deficit hyperactivity disorder)     Bilateral carotid artery stenosis     follows with Dr Julia Jeong annually    COPD (chronic obstructive pulmonary disease) ([facility] [address]     Epicondylitis, lateral     Erectile dysfunction     Esophageal erosions     Fatty liver     Folate deficiency     Gastroesophageal reflux disease without esophagitis 2019    HSV infection     Hyperlipidemia     Hypertension     BRYCE (renal artery stenosis) (HCC)     right-stent    Vitamin B 12 deficiency      Past Surgical History:   Procedure Laterality Date    BACK SURGERY      ruptured disc    CARDIAC CATHETERIZATION      CAROTID ENDARTERECTOMY  10/2018    COLONOSCOPY      COLONOSCOPY N/A 2021    COLONOSCOPY WITH BIOPSY performed by Padmini Stewart MD at [place] [address] ARTHROSCOPY Left     UPPER GASTROINTESTINAL ENDOSCOPY      UPPER GASTROINTESTINAL ENDOSCOPY N/A 12/30/2021    EGD BIOPSY performed by Padmini Stewart MD at [facility] ENDOSCOPY       Current Outpatient Medications:     fluocinolone (DERMOTIC) 0.01 % OIL oil, Place 2 drops in ear(s) 2 times daily as needed (itching, ear pain) Use for itching in ear canals. Stop when symptoms resolved., Disp: 1 each, Rfl: 2    methylPREDNISolone (MEDROL DOSEPACK) 4 MG tablet, Take by mouth., Disp: 1 kit, Rfl: 0    ondansetron (ZOFRAN ODT) 4 MG disintegrating tablet, Take 1 tablet by mouth 3 times daily as needed for Nausea or Vomiting, Disp: 21 tablet, Rfl: 0    simvastatin (ZOCOR) 20 MG tablet, Take 1 tablet by mouth nightly, Disp: 90 tablet, Rfl: 1    tadalafil (CIALIS) 5 MG tablet, Take 1 tablet by mouth daily, Disp: 90 tablet, Rfl: 1    aspirin 81 MG EC tablet, Take 81 mg by mouth daily, Disp: , Rfl:     neomycin-polymyxin-hydrocortisone (CORTISPORIN) 3.5-45901-7 otic solution, Place 4 drops into both ears 3 times daily Use as needed for ear pain, Disp: 1 each, Rfl: 2    pantoprazole (PROTONIX) 20 MG tablet, Take 1 tablet by mouth every morning (before breakfast), Disp: 30 tablet, Rfl: 5    azelastine (ASTELIN) 0.1 % nasal spray, 1-2 sprays by Nasal route 2 times daily as needed for Rhinitis Use in each nostril as directed, Disp: 30 mL, Rfl: 1    fluticasone (FLONASE) 50 MCG/ACT nasal spray, 2 sprays by Each Nostril route daily, Disp: 1 Bottle, Rfl: 0    Coenzyme Q10 (CO Q-10) 100 MG CAPS, Take 1 capsule by mouth daily, Disp: 90 capsule, Rfl: 1    clonazePAM (KLONOPIN) 1 MG tablet, Take 1 tablet by mouth daily as needed for Anxiety for up to 30 days. , Disp: 30 tablet, Rfl: 1  Patient has no known allergies.   Social History     Tobacco Use    Smoking status: Former     Packs/day: 1.50     Years: 37.00     Pack years: 55.50     Types: Cigars, Cigarettes Quit date: 2020     Years since quittin.4    Smokeless tobacco: Never   Vaping Use    Vaping Use: Never used   Substance Use Topics    Alcohol use: Yes     Alcohol/week: 3.0 standard drinks     Types: 3 Glasses of wine per week    Drug use: No     Family History   Problem Relation Age of Onset    Diabetes Mother     Stroke Mother     Alcohol Abuse Father        Review of Systems   Constitutional: Negative. Negative for activity change and appetite change. HENT:  Positive for ear discharge (Is clear drainage from ear, intermittently). Negative for congestion, ear pain, postnasal drip, rhinorrhea, sinus pressure and sinus pain. Itching of bilateral ears   Eyes: Negative. Respiratory: Negative. Negative for shortness of breath and stridor. Cardiovascular: Negative. Negative for chest pain and palpitations. Endocrine: Negative. Musculoskeletal: Negative. Skin: Negative. Neurological:  Positive for numbness (Left neck -decreased). Negative for dizziness. Hematological: Negative. Psychiatric/Behavioral: Negative. /87   Pulse 73   Ht 6' 1\" (1.854 m)   Wt 206 lb (93.4 kg)   BMI 27.18 kg/m²   Physical Exam  Constitutional:       Appearance: Normal appearance. HENT:      Head: Normocephalic. Jaw: There is normal jaw occlusion. No tenderness. Right Ear: Tympanic membrane, ear canal and external ear normal.      Left Ear: Tympanic membrane, ear canal and external ear normal.      Ears:        Comments: No drainage or fissures in either ear canal.  Bilateral ear canals intact     Nose: No rhinorrhea. Right Turbinates: Not swollen or pale. Left Turbinates: Not swollen or pale. Mouth/Throat:      Lips: Pink. Dentition: Has dentures. Pharynx: Oropharynx is clear. Eyes:      Conjunctiva/sclera: Conjunctivae normal.      Pupils: Pupils are equal, round, and reactive to light.    Neck:     Cardiovascular:      Rate and Rhythm: Normal rate and regular rhythm. Pulses: Normal pulses. Pulmonary:      Effort: Pulmonary effort is normal. No respiratory distress. Breath sounds: No stridor. Musculoskeletal:         General: Normal range of motion. Cervical back: Normal range of motion. No rigidity. No muscular tenderness. Skin:     General: Skin is warm and dry. Neurological:      General: No focal deficit present. Mental Status: He is alert and oriented to person, place, and time. Psychiatric:         Mood and Affect: Mood normal.         Behavior: Behavior normal.         Thought Content: Thought content normal.         Judgment: Judgment normal.       IMPRESSION/PLAN:      Lucina vAila was seen today for follow-up. Diagnoses and all orders for this visit:    Otalgia, bilateral    Chronic eczematous otitis externa of both ears    Ear lesion    Other orders  -     fluocinolone (DERMOTIC) 0.01 % OIL oil; Place 2 drops in ear(s) 2 times daily as needed (itching, ear pain) Use for itching in ear canals. Stop when symptoms resolved. At this time patient is given a prescription for DermOtic oil, 2 drops into the affected ear for itching and pain twice daily with instructions to stop when symptoms have resolved. Also recommend for patient to place 2 to 3 drops of isopropyl alcohol in the ear canal when water is suspected to be trapped in the ear as this seems to initiate his symptoms. Patient also has a small scabbed area to the helix of his left ear. He will be given bacitracin ointment to be applied twice daily for 2 weeks. If symptoms do not resolve he is instructed to call the office and will have his follow-up moved to Dr. Elzbieta Hemphill for further evaluation and possible biopsy. He agrees to this plan. He will otherwise follow-up in 1 month. He will call for any new or worsening symptoms prior to his next appointment.       Keo Vallejo, MSN, FNP-C  [address], Nose and Throat    The information contained in this note has been dictated using drug and medical speech recognition software and may contain errors

## 2022-08-22 ENCOUNTER — OFFICE VISIT (OUTPATIENT)
Dept: ENT CLINIC | Age: 57
End: 2022-08-22
Payer: COMMERCIAL

## 2022-08-22 VITALS — BODY MASS INDEX: 27.3 KG/M2 | WEIGHT: 206 LBS | HEIGHT: 73 IN

## 2022-08-22 DIAGNOSIS — H92.03 OTALGIA, BILATERAL: ICD-10-CM

## 2022-08-22 DIAGNOSIS — J02.9 ACUTE PHARYNGITIS, UNSPECIFIED ETIOLOGY: Primary | ICD-10-CM

## 2022-08-22 DIAGNOSIS — H93.90 EAR LESION: ICD-10-CM

## 2022-08-22 DIAGNOSIS — M26.623 BILATERAL TEMPOROMANDIBULAR JOINT PAIN: ICD-10-CM

## 2022-08-22 PROCEDURE — 99213 OFFICE O/P EST LOW 20 MIN: CPT | Performed by: NURSE PRACTITIONER

## 2022-08-22 RX ORDER — METHYLPREDNISOLONE 4 MG/1
4 TABLET ORAL SEE ADMIN INSTRUCTIONS
Qty: 1 KIT | Refills: 0 | Status: SHIPPED | OUTPATIENT
Start: 2022-08-22 | End: 2022-08-28

## 2022-08-22 RX ORDER — AZELASTINE 1 MG/ML
1-2 SPRAY, METERED NASAL 2 TIMES DAILY PRN
Qty: 30 ML | Refills: 1 | Status: SHIPPED | OUTPATIENT
Start: 2022-08-22

## 2022-08-22 RX ORDER — AMOXICILLIN AND CLAVULANATE POTASSIUM 875; 125 MG/1; MG/1
1 TABLET, FILM COATED ORAL 2 TIMES DAILY
Qty: 14 TABLET | Refills: 0 | Status: SHIPPED | OUTPATIENT
Start: 2022-08-22 | End: 2022-08-29

## 2022-08-22 ASSESSMENT — ENCOUNTER SYMPTOMS
SHORTNESS OF BREATH: 0
EYES NEGATIVE: 1
STRIDOR: 0
RHINORRHEA: 0
RESPIRATORY NEGATIVE: 1
SINUS PRESSURE: 0
SINUS PAIN: 0
TROUBLE SWALLOWING: 1
SORE THROAT: 1

## 2022-08-22 NOTE — PROGRESS NOTES
[facility] Otolaryngology  Dr. Danielito Sexton. Patricia Quinones. Ms.Ed        Patient Name:  Prudencio Pollock  :  1965     CHIEF C/O:    Chief Complaint   Patient presents with    Follow-up     Patient states that ears have had improvements but still ache when getting wet. States that he does now have a sore throat and body aches that started over the weekend. States that he was in bed all weekend. No report of fever. HISTORY OBTAINED FROM:  patient    HISTORY OF PRESENT ILLNESS:       Jared Caballero is a 61y.o. year old male, here today for follow up of Bilateral ear pain, left ear lesion, and new sore throat. Patient was last seen 1 month ago and given DermOtic oil for bilateral ear itching and pain as well as bacitracin ointment for left ear lesion. He states that the DermOtic oil made no difference in his symptoms of ear itching or pain. He continues to have some symptoms after swimming with improvement after using Cortisporin drops. He does note tenderness around the right ear with his ear symptoms. He did continue using bacitracin ointment to the left ear with some decrease in pain however it does remain mildly discolored and tender. Patient comes today complaining of 3 to 4-day history of sore throat on the right. He states his throat is red with painful swallowing. Patient's tonsils are intact. He does complain of persistent postnasal drainage without considerable congestion or rhinorrhea at this time.       Past Medical History:   Diagnosis Date    Abdominal pain     for ECG and c-scope 21    ADHD (attention deficit hyperactivity disorder)     Bilateral carotid artery stenosis     follows with Dr Bernard Marquez annually    COPD (chronic obstructive pulmonary disease) ([facility] [address]     Epicondylitis, lateral     Erectile dysfunction     Esophageal erosions     Fatty liver     Folate deficiency     Gastroesophageal reflux disease without esophagitis 2019    HSV infection     Hyperlipidemia     Hypertension BRYCE (renal artery stenosis) ([facility] [address]     right-stent    Vitamin B 12 deficiency      Past Surgical History:   Procedure Laterality Date    BACK SURGERY      ruptured disc    CARDIAC CATHETERIZATION      CAROTID ENDARTERECTOMY  10/2018    COLONOSCOPY      COLONOSCOPY N/A 12/30/2021    COLONOSCOPY WITH BIOPSY performed by Ariella Rodriguez MD at [place] [address] ARTHROSCOPY Left     UPPER GASTROINTESTINAL ENDOSCOPY      UPPER GASTROINTESTINAL ENDOSCOPY N/A 12/30/2021    EGD BIOPSY performed by Ariella Rodriguez MD at [facility] ENDOSCOPY       Current Outpatient Medications:     methylPREDNISolone (MEDROL DOSEPACK) 4 MG tablet, Take 1 tablet by mouth See Admin Instructions for 6 days Take by mouth., Disp: 1 kit, Rfl: 0    amoxicillin-clavulanate (AUGMENTIN) 875-125 MG per tablet, Take 1 tablet by mouth 2 times daily for 7 days, Disp: 14 tablet, Rfl: 0    azelastine (ASTELIN) 0.1 % nasal spray, 1-2 sprays by Nasal route 2 times daily as needed for Rhinitis Use in each nostril as directed, Disp: 30 mL, Rfl: 1    fluocinolone (DERMOTIC) 0.01 % OIL oil, Place 2 drops in ear(s) 2 times daily as needed (itching, ear pain) Use for itching in ear canals.  Stop when symptoms resolved., Disp: 1 each, Rfl: 2    ondansetron (ZOFRAN ODT) 4 MG disintegrating tablet, Take 1 tablet by mouth 3 times daily as needed for Nausea or Vomiting, Disp: 21 tablet, Rfl: 0    simvastatin (ZOCOR) 20 MG tablet, Take 1 tablet by mouth nightly, Disp: 90 tablet, Rfl: 1    tadalafil (CIALIS) 5 MG tablet, Take 1 tablet by mouth daily, Disp: 90 tablet, Rfl: 1    aspirin 81 MG EC tablet, Take 81 mg by mouth daily, Disp: , Rfl:     neomycin-polymyxin-hydrocortisone (CORTISPORIN) 3.5-78167-2 otic solution, Place 4 drops into both ears 3 times daily Use as needed for ear pain, Disp: 1 each, Rfl: 2    pantoprazole (PROTONIX) 20 MG tablet, Take 1 tablet by mouth every morning (before breakfast), Disp: 30 tablet, Rfl: 5    fluticasone (FLONASE) 50 MCG/ACT nasal spray, 2 sprays by Each Nostril route daily, Disp: 1 Bottle, Rfl: 0    Coenzyme Q10 (CO Q-10) 100 MG CAPS, Take 1 capsule by mouth daily, Disp: 90 capsule, Rfl: 1    clonazePAM (KLONOPIN) 1 MG tablet, Take 1 tablet by mouth daily as needed for Anxiety for up to 30 days. , Disp: 30 tablet, Rfl: 1  Patient has no known allergies. Social History     Tobacco Use    Smoking status: Former     Packs/day: 1.50     Years: 37.00     Pack years: 55.50     Types: Cigars, Cigarettes     Quit date: 2020     Years since quittin.5    Smokeless tobacco: Never   Vaping Use    Vaping Use: Never used   Substance Use Topics    Alcohol use: Yes     Alcohol/week: 3.0 standard drinks     Types: 3 Glasses of wine per week    Drug use: No     Family History   Problem Relation Age of Onset    Diabetes Mother     Stroke Mother     Alcohol Abuse Father        Review of Systems   Constitutional: Negative. Negative for activity change and appetite change. HENT:  Positive for ear discharge (Is clear drainage from ear, intermittently), ear pain, postnasal drip, sore throat and trouble swallowing. Negative for congestion, rhinorrhea, sinus pressure and sinus pain. Itching of bilateral ears   Eyes: Negative. Respiratory: Negative. Negative for shortness of breath and stridor. Cardiovascular: Negative. Negative for chest pain and palpitations. Endocrine: Negative. Musculoskeletal: Negative. Skin: Negative. Neurological:  Positive for numbness (Left neck -decreased). Negative for dizziness. Hematological: Negative. Psychiatric/Behavioral: Negative. Ht 6' 1\" (1.854 m)   Wt 206 lb (93.4 kg)   BMI 27.18 kg/m²   Physical Exam  Constitutional:       Appearance: Normal appearance. HENT:      Head: Normocephalic. Jaw: There is normal jaw occlusion. No tenderness.         Right Ear: Tympanic membrane, ear canal and external ear normal.      Left Ear: Tympanic membrane, ear canal and pharyngitis while starting a Medrol Dosepak to be used as directed. He was be given refills of Astelin spray, 1 to 2 sprays each nostril twice daily as needed for postnasal drainage symptoms. Due to the continued discoloration of the left helix of his ear he will be referred to Dr. Nina Murcia for further evaluation and possible biopsy. It is also discussed with the patient that his ear pain is most likely be coming from TMJ symptoms. He is instructed to begin using warm compresses and anti-inflammatory medication at this time for the symptoms. He agrees with this plan. He will call for any new or worsening of symptoms prior to his next appointment.     Norman Horne, JASON, FNP-C  [address] [facility], Nose and Throat    The information contained in this note has been dictated using drug and medical speech recognition software and may contain errors

## 2022-09-19 ENCOUNTER — OFFICE VISIT (OUTPATIENT)
Dept: ENT CLINIC | Age: 57
End: 2022-09-19
Payer: COMMERCIAL

## 2022-09-19 VITALS
WEIGHT: 205 LBS | SYSTOLIC BLOOD PRESSURE: 134 MMHG | HEART RATE: 67 BPM | HEIGHT: 73 IN | DIASTOLIC BLOOD PRESSURE: 88 MMHG | BODY MASS INDEX: 27.17 KG/M2

## 2022-09-19 DIAGNOSIS — M26.623 BILATERAL TEMPOROMANDIBULAR JOINT PAIN: ICD-10-CM

## 2022-09-19 DIAGNOSIS — H92.03 OTALGIA OF BOTH EARS: Primary | ICD-10-CM

## 2022-09-19 PROCEDURE — 99204 OFFICE O/P NEW MOD 45 MIN: CPT | Performed by: OTOLARYNGOLOGY

## 2022-09-19 ASSESSMENT — ENCOUNTER SYMPTOMS
EYES NEGATIVE: 1
RHINORRHEA: 0
SINUS PRESSURE: 0
SHORTNESS OF BREATH: 0
STRIDOR: 0
RESPIRATORY NEGATIVE: 1
SINUS PAIN: 0

## 2022-09-19 NOTE — PROGRESS NOTES
[facility]  Dr. Sharona Ma. Azucena Peralta. Ms.Ed        Patient Name:  Joon Kitchen  :  1965     CHIEF C/O:    Chief Complaint   Patient presents with    Follow-up     Pt states lesion on left ear has improved with cream samples that was given by THOMAS Chester. Pt also states right pain and numbness. HISTORY OBTAINED FROM:  patient    HISTORY OF PRESENT ILLNESS:       Alessandro Singh is a 61y.o. year old male, here today for follow up of Bilateral ear pain, left ear lesion, and new sore throat. Patient was last seen 1 month ago and given DermOtic oil for bilateral ear itching and pain as well as bacitracin ointment for left ear lesion. He states that the DermOtic oil made no difference in his symptoms of ear itching or pain. He continues to have some symptoms after swimming with improvement after using Cortisporin drops. He does note tenderness around the right ear with his ear symptoms. He did continue using bacitracin ointment to the left ear with some decrease in pain however it does remain mildly discolored and tender. Patient comes today complaining of 3 to 4-day history of sore throat on the right. He states his throat is red with painful swallowing. Patient's tonsils are intact. He does complain of persistent postnasal drainage without considerable congestion or rhinorrhea at this time. 22: PT hear today for ear complaints. Mostly when getting wet. Endorses numbness on right neck. Was seen by Sarah James and given Dermotic drops and antibiotics which helped. History of carotid endarterectomy 4 years ago. Biggest complaint is intermittent shooting pains of bilateral ears and intermittent numbness in the post auricular region. Pt is a former smoker. Denies hearing complaints, denies drainage, denies changes in voices denies nasal obstruction.        Past Medical History:   Diagnosis Date    Abdominal pain     for ECG and c-scope 21    ADHD (attention deficit hyperactivity disorder) Bilateral carotid artery stenosis     follows with Dr Santos Jones annually    COPD (chronic obstructive pulmonary disease) ([facility] [address]     Epicondylitis, lateral     Erectile dysfunction     Esophageal erosions     Fatty liver     Folate deficiency     Gastroesophageal reflux disease without esophagitis 5/29/2019    HSV infection     Hyperlipidemia     Hypertension     BRYCE (renal artery stenosis) (HCC)     right-stent    Vitamin B 12 deficiency      Past Surgical History:   Procedure Laterality Date    BACK SURGERY      ruptured disc    CARDIAC CATHETERIZATION      CAROTID ENDARTERECTOMY  10/2018    COLONOSCOPY      COLONOSCOPY N/A 12/30/2021    COLONOSCOPY WITH BIOPSY performed by Randi Chaudhary MD at [place] [address] ARTHROSCOPY Left     UPPER GASTROINTESTINAL ENDOSCOPY      UPPER GASTROINTESTINAL ENDOSCOPY N/A 12/30/2021    EGD BIOPSY performed by Randi Chaudhary MD at [facility] ENDOSCOPY       Current Outpatient Medications:     azelastine (ASTELIN) 0.1 % nasal spray, 1-2 sprays by Nasal route 2 times daily as needed for Rhinitis Use in each nostril as directed, Disp: 30 mL, Rfl: 1    fluocinolone (DERMOTIC) 0.01 % OIL oil, Place 2 drops in ear(s) 2 times daily as needed (itching, ear pain) Use for itching in ear canals.  Stop when symptoms resolved., Disp: 1 each, Rfl: 2    ondansetron (ZOFRAN ODT) 4 MG disintegrating tablet, Take 1 tablet by mouth 3 times daily as needed for Nausea or Vomiting, Disp: 21 tablet, Rfl: 0    simvastatin (ZOCOR) 20 MG tablet, Take 1 tablet by mouth nightly, Disp: 90 tablet, Rfl: 1    tadalafil (CIALIS) 5 MG tablet, Take 1 tablet by mouth daily, Disp: 90 tablet, Rfl: 1    aspirin 81 MG EC tablet, Take 81 mg by mouth daily, Disp: , Rfl:     neomycin-polymyxin-hydrocortisone (CORTISPORIN) 3.5-02856-9 otic solution, Place 4 drops into both ears 3 times daily Use as needed for ear pain, Disp: 1 each, Rfl: 2    pantoprazole (PROTONIX) 20 MG tablet, Take 1 tablet by mouth every morning (before breakfast), Disp: 30 tablet, Rfl: 5    fluticasone (FLONASE) 50 MCG/ACT nasal spray, 2 sprays by Each Nostril route daily, Disp: 1 Bottle, Rfl: 0    Coenzyme Q10 (CO Q-10) 100 MG CAPS, Take 1 capsule by mouth daily, Disp: 90 capsule, Rfl: 1    clonazePAM (KLONOPIN) 1 MG tablet, Take 1 tablet by mouth daily as needed for Anxiety for up to 30 days. , Disp: 30 tablet, Rfl: 1  Patient has no known allergies. Social History     Tobacco Use    Smoking status: Former     Packs/day: 1.50     Years: 37.00     Pack years: 55.50     Types: Cigars, Cigarettes     Quit date: 2020     Years since quittin.5    Smokeless tobacco: Never   Vaping Use    Vaping Use: Never used   Substance Use Topics    Alcohol use: Yes     Alcohol/week: 3.0 standard drinks     Types: 3 Glasses of wine per week    Drug use: No     Family History   Problem Relation Age of Onset    Diabetes Mother     Stroke Mother     Alcohol Abuse Father        Review of Systems   Constitutional: Negative. Negative for activity change and appetite change. HENT:  Positive for ear pain. Negative for congestion, rhinorrhea, sinus pressure and sinus pain. Ear discharge: Is clear drainage from ear, intermittently. Itching of bilateral ears   Eyes: Negative. Respiratory: Negative. Negative for shortness of breath and stridor. Cardiovascular: Negative. Negative for chest pain and palpitations. Endocrine: Negative. Musculoskeletal: Negative. Skin: Negative. Neurological:  Positive for numbness (Left neck -decreased). Negative for dizziness. Hematological: Negative. Psychiatric/Behavioral: Negative. /88 (Site: Left Upper Arm, Position: Sitting, Cuff Size: Large Adult)   Pulse 67   Ht 6' 1\" (1.854 m)   Wt 205 lb (93 kg)   BMI 27.05 kg/m²   Physical Exam  Constitutional:       Appearance: Normal appearance. HENT:      Head: Normocephalic. Jaw: There is normal jaw occlusion.  No tenderness. Right Ear: Tympanic membrane, ear canal and external ear normal.      Left Ear: Tympanic membrane, ear canal and external ear normal.      Ears:        Comments: No drainage or fissures in either ear canal.  Bilateral ear canals intact     Nose: No rhinorrhea. Right Turbinates: Not swollen or pale. Left Turbinates: Not swollen or pale. Mouth/Throat:      Lips: Pink. Dentition: Has dentures. Pharynx: Oropharynx is clear. Tonsils: 2+ on the right. Eyes:      Conjunctiva/sclera: Conjunctivae normal.      Pupils: Pupils are equal, round, and reactive to light. Neck:     Cardiovascular:      Rate and Rhythm: Normal rate and regular rhythm. Pulses: Normal pulses. Pulmonary:      Effort: Pulmonary effort is normal. No respiratory distress. Breath sounds: No stridor. Musculoskeletal:         General: Normal range of motion. Cervical back: Normal range of motion. No rigidity. No muscular tenderness. Skin:     General: Skin is warm and dry. Neurological:      General: No focal deficit present. Mental Status: He is alert and oriented to person, place, and time. Psychiatric:         Mood and Affect: Mood normal.         Behavior: Behavior normal.         Thought Content: Thought content normal.         Judgment: Judgment normal.       IMPRESSION/PLAN:    There are no diagnoses linked to this encounter. Patient seen and examined, No symptoms of significant concern. Suspect TMJ responsible for intermittent shooting ear pain. Recommend soft diet, warm compress and NSAIDs as needed for pain. Pt voices understanding and is happy with plan. Return to clinic if symptoms worsen. Follow up PRN.      The information contained in this note has been dictated using drug and medical speech recognition software and may contain errors          Joyce Younger  1965      I have discussed the case, including pertinent history and exam findings with the resident. I have seen and examined the patient and the key elements of the encounter have been performed by me. I agree with the assessment, plan and orders as documented by the resident. Patient here for follow up of medical problems. Remainder of medical problems as per resident note.       [address] [facility], DO  10/10/22

## 2022-11-08 ENCOUNTER — TELEPHONE (OUTPATIENT)
Dept: ADMINISTRATIVE | Age: 57
End: 2022-11-08

## 2022-11-08 NOTE — TELEPHONE ENCOUNTER
Pt of Dr. Mirlande Pollock, please advise pt if he can be scheduled or needs to go to [facility].      Electronically signed by Love Wade MA on 11/8/22 at 11:51 AM EST

## 2022-11-08 NOTE — TELEPHONE ENCOUNTER
Pt called to be seen today as he woke uo in th middle of the night with some fluid in the ear. He went back to bed and woke up with blood all over his pillow. It was dried blood in the ear. Has stopped bleeding. He would like to speak to the office. Please contact pt. Pt is aware if needed he can go to Urgent care, ED or call PCP.

## 2022-11-16 ENCOUNTER — OFFICE VISIT (OUTPATIENT)
Dept: ENT CLINIC | Age: 57
End: 2022-11-16
Payer: COMMERCIAL

## 2022-11-16 VITALS
BODY MASS INDEX: 27.17 KG/M2 | DIASTOLIC BLOOD PRESSURE: 89 MMHG | HEIGHT: 73 IN | HEART RATE: 87 BPM | WEIGHT: 205 LBS | SYSTOLIC BLOOD PRESSURE: 133 MMHG

## 2022-11-16 DIAGNOSIS — M26.601 DISORDER OF RIGHT TEMPOROMANDIBULAR JOINT: ICD-10-CM

## 2022-11-16 DIAGNOSIS — H92.01 OTALGIA OF RIGHT EAR: Primary | ICD-10-CM

## 2022-11-16 DIAGNOSIS — S00.411D ABRASION OF RIGHT EAR CANAL, SUBSEQUENT ENCOUNTER: ICD-10-CM

## 2022-11-16 PROCEDURE — 3074F SYST BP LT 130 MM HG: CPT | Performed by: NURSE PRACTITIONER

## 2022-11-16 PROCEDURE — 99214 OFFICE O/P EST MOD 30 MIN: CPT | Performed by: NURSE PRACTITIONER

## 2022-11-16 PROCEDURE — 3078F DIAST BP <80 MM HG: CPT | Performed by: NURSE PRACTITIONER

## 2022-11-16 ASSESSMENT — ENCOUNTER SYMPTOMS
EYES NEGATIVE: 1
RESPIRATORY NEGATIVE: 1
RHINORRHEA: 0
SINUS PRESSURE: 0
SHORTNESS OF BREATH: 0
STRIDOR: 0
SINUS PAIN: 0
TROUBLE SWALLOWING: 0
SORE THROAT: 0

## 2022-11-16 NOTE — PROGRESS NOTES
[facility]  Dr. Kelsy Garcia. Lou Moran. Ms.Ed        Patient Name:  Waldemar Ramirez  :  1965     CHIEF C/O:    Chief Complaint   Patient presents with    Follow-up    Ear Problem     Pt reports blood on pillow X2 days 1 week ago from RT ear. None since, UC said it was a scratch. HISTORY OBTAINED FROM:  patient    HISTORY OF PRESENT ILLNESS:       Buster Fernandez is a 61y.o. year old male, here today for follow up of bleeding from right ear and chronic right ear pain. Patient states approximately 1 week ago noted bleeding from the right ear canal on his pillowcase. He has since been seen at urgent care was told there was an abrasion within the right ear canal.  He has clean the ear with Q-tips with continued blood but states he has not noticed any further drainage from the ear. He continues to have persistent right ear and jaw pain with numbness to the right side of his face and neck. He has used conservative treatment of anti-inflammatory medication and warm compresses for TMJ symptoms without significant improvement. He denies any changes to his hearing at this time.       Past Medical History:   Diagnosis Date    Abdominal pain     for ECG and c-scope 21    ADHD (attention deficit hyperactivity disorder)     Bilateral carotid artery stenosis     follows with Dr Kelsy Gaxiola annually    COPD (chronic obstructive pulmonary disease) ([facility] [address]     Epicondylitis, lateral     Erectile dysfunction     Esophageal erosions     Fatty liver     Folate deficiency     Gastroesophageal reflux disease without esophagitis 2019    HSV infection     Hyperlipidemia     Hypertension     BRYCE (renal artery stenosis) ([address]     right-stent    Vitamin B 12 deficiency      Past Surgical History:   Procedure Laterality Date    BACK SURGERY      ruptured disc    CARDIAC CATHETERIZATION      CAROTID ENDARTERECTOMY  10/2018    COLONOSCOPY      COLONOSCOPY N/A 2021    [facility] at [place] [address] ARTHROSCOPY Left     UPPER GASTROINTESTINAL ENDOSCOPY      UPPER GASTROINTESTINAL ENDOSCOPY N/A 2021    EGD BIOPSY performed by Luis Ballard MD at [facility] ENDOSCOPY       Current Outpatient Medications:     azelastine (ASTELIN) 0.1 % nasal spray, 1-2 sprays by Nasal route 2 times daily as needed for Rhinitis Use in each nostril as directed, Disp: 30 mL, Rfl: 1    fluocinolone (DERMOTIC) 0.01 % OIL oil, Place 2 drops in ear(s) 2 times daily as needed (itching, ear pain) Use for itching in ear canals. Stop when symptoms resolved., Disp: 1 each, Rfl: 2    ondansetron (ZOFRAN ODT) 4 MG disintegrating tablet, Take 1 tablet by mouth 3 times daily as needed for Nausea or Vomiting, Disp: 21 tablet, Rfl: 0    simvastatin (ZOCOR) 20 MG tablet, Take 1 tablet by mouth nightly, Disp: 90 tablet, Rfl: 1    tadalafil (CIALIS) 5 MG tablet, Take 1 tablet by mouth daily, Disp: 90 tablet, Rfl: 1    aspirin 81 MG EC tablet, Take 81 mg by mouth daily, Disp: , Rfl:     neomycin-polymyxin-hydrocortisone (CORTISPORIN) 3.5-20640-9 otic solution, Place 4 drops into both ears 3 times daily Use as needed for ear pain, Disp: 1 each, Rfl: 2    pantoprazole (PROTONIX) 20 MG tablet, Take 1 tablet by mouth every morning (before breakfast), Disp: 30 tablet, Rfl: 5    fluticasone (FLONASE) 50 MCG/ACT nasal spray, 2 sprays by Each Nostril route daily, Disp: 1 Bottle, Rfl: 0    Coenzyme Q10 (CO Q-10) 100 MG CAPS, Take 1 capsule by mouth daily, Disp: 90 capsule, Rfl: 1    clonazePAM (KLONOPIN) 1 MG tablet, Take 1 tablet by mouth daily as needed for Anxiety for up to 30 days. , Disp: 30 tablet, Rfl: 1  Patient has no known allergies.   Social History     Tobacco Use    Smoking status: Former     Packs/day: 1.50     Years: 37.00     Pack years: 55.50     Types: Cigars, Cigarettes     Quit date: 2020     Years since quittin.7    Smokeless tobacco: Never   Vaping Use    Vaping Use: Never used   Substance Use Topics    Alcohol use: Yes     Alcohol/week: 3.0 standard drinks     Types: 3 Glasses of wine per week    Drug use: No     Family History   Problem Relation Age of Onset    Diabetes Mother     Stroke Mother     Alcohol Abuse Father        Review of Systems   Constitutional: Negative. Negative for activity change and appetite change. HENT:  Positive for ear discharge (Is clear drainage from ear, intermittently) and ear pain. Negative for congestion, postnasal drip, rhinorrhea, sinus pressure, sinus pain, sore throat and trouble swallowing. Itching of bilateral ears   Eyes: Negative. Respiratory: Negative. Negative for shortness of breath and stridor. Cardiovascular: Negative. Negative for chest pain and palpitations. Endocrine: Negative. Musculoskeletal: Negative. Skin: Negative. Neurological:  Positive for numbness (Left neck -decreased). Negative for dizziness. Hematological: Negative. Psychiatric/Behavioral: Negative. /89   Pulse 87   Ht 6' 1\" (1.854 m)   Wt 205 lb (93 kg)   BMI 27.05 kg/m²   Physical Exam  Constitutional:       Appearance: Normal appearance. HENT:      Head: Normocephalic. Jaw: There is normal jaw occlusion. No tenderness. Right Ear: Tympanic membrane, ear canal and external ear normal.      Left Ear: Tympanic membrane, ear canal and external ear normal.      Ears:        Nose: No rhinorrhea. Right Turbinates: Not swollen or pale. Left Turbinates: Not swollen or pale. Mouth/Throat:      Lips: Pink. Dentition: Has dentures. Pharynx: Oropharynx is clear. Posterior oropharyngeal erythema present. Tonsils: 2+ on the right. Eyes:      Conjunctiva/sclera: Conjunctivae normal.      Pupils: Pupils are equal, round, and reactive to light. Neck:     Cardiovascular:      Rate and Rhythm: Normal rate and regular rhythm. Pulses: Normal pulses.    Pulmonary:      Effort: Pulmonary effort is normal. No respiratory distress. Breath sounds: No stridor. Musculoskeletal:         General: Normal range of motion. Cervical back: Normal range of motion. No rigidity. No muscular tenderness. Skin:     General: Skin is warm and dry. Neurological:      General: No focal deficit present. Mental Status: He is alert and oriented to person, place, and time. Psychiatric:         Mood and Affect: Mood normal.         Behavior: Behavior normal.         Thought Content: Thought content normal.         Judgment: Judgment normal.       IMPRESSION/PLAN:    Ephraim Galicia was seen today for follow-up and ear problem. Diagnoses and all orders for this visit:    Otalgia of right ear  -     CT IAC POSTERIOR FOSSA WO CONTRAST; Future    Disorder of right temporomandibular joint  -     CT IAC POSTERIOR FOSSA WO CONTRAST; Future    Abrasion of right ear canal, subsequent encounter      It is time patient is instructed to begin using his eardrops at home for any drainage noted from the right ear. Due to the chronic and persistent nature of his right-sided ear and jaw pain a CT of the IAC posterior fossa without contrast is ordered for further evaluation of the area. He will follow-up in 1 month for reevaluation and results. He is instructed to call with any new or worsening symptoms prior to his next appointment.     Mirtha Gupta, MSN, FNP-C  [facility], Nose and Throat    The information contained in this note has been dictated using drug and medical speech recognition software and may contain errors

## 2022-12-19 ENCOUNTER — HOSPITAL ENCOUNTER (OUTPATIENT)
Dept: CT IMAGING | Age: 57
Discharge: HOME | End: 2022-12-21
Payer: COMMERCIAL

## 2022-12-19 DIAGNOSIS — H92.01 OTALGIA OF RIGHT EAR: ICD-10-CM

## 2022-12-19 DIAGNOSIS — M26.601 DISORDER OF RIGHT TEMPOROMANDIBULAR JOINT: ICD-10-CM

## 2022-12-19 PROCEDURE — 70480 CT ORBIT/EAR/FOSSA W/O DYE: CPT

## 2022-12-21 ENCOUNTER — OFFICE VISIT (OUTPATIENT)
Dept: ENT CLINIC | Age: 57
End: 2022-12-21
Payer: COMMERCIAL

## 2022-12-21 VITALS — WEIGHT: 205 LBS | HEIGHT: 73 IN | BODY MASS INDEX: 27.17 KG/M2

## 2022-12-21 DIAGNOSIS — L29.9 ITCHING OF EAR: ICD-10-CM

## 2022-12-21 DIAGNOSIS — H92.01 OTALGIA OF RIGHT EAR: Primary | ICD-10-CM

## 2022-12-21 DIAGNOSIS — M26.621 TENDERNESS OF RIGHT TEMPOROMANDIBULAR JOINT: ICD-10-CM

## 2022-12-21 PROCEDURE — 99213 OFFICE O/P EST LOW 20 MIN: CPT | Performed by: NURSE PRACTITIONER

## 2022-12-21 RX ORDER — FLUOCINOLONE ACETONIDE 0.11 MG/ML
2 OIL AURICULAR (OTIC) 2 TIMES DAILY PRN
Qty: 1 EACH | Refills: 2 | Status: SHIPPED | OUTPATIENT
Start: 2022-12-21

## 2022-12-21 ASSESSMENT — ENCOUNTER SYMPTOMS
SINUS PAIN: 0
EYES NEGATIVE: 1
RESPIRATORY NEGATIVE: 1
TROUBLE SWALLOWING: 0
SINUS PRESSURE: 0
SORE THROAT: 0
SHORTNESS OF BREATH: 0
RHINORRHEA: 0
STRIDOR: 0

## 2022-12-21 NOTE — PROGRESS NOTES
N/A 2021    EGD BIOPSY performed by Sheri Wei MD at [facility] ENDOSCOPY       Current Outpatient Medications:     fluocinolone (DERMOTIC) 0.01 % OIL oil, Place 2 drops in ear(s) 2 times daily as needed (itching) Use for itching in ear canals. Stop when symptoms resolved., Disp: 1 each, Rfl: 2    azelastine (ASTELIN) 0.1 % nasal spray, 1-2 sprays by Nasal route 2 times daily as needed for Rhinitis Use in each nostril as directed, Disp: 30 mL, Rfl: 1    ondansetron (ZOFRAN ODT) 4 MG disintegrating tablet, Take 1 tablet by mouth 3 times daily as needed for Nausea or Vomiting, Disp: 21 tablet, Rfl: 0    simvastatin (ZOCOR) 20 MG tablet, Take 1 tablet by mouth nightly, Disp: 90 tablet, Rfl: 1    tadalafil (CIALIS) 5 MG tablet, Take 1 tablet by mouth daily, Disp: 90 tablet, Rfl: 1    aspirin 81 MG EC tablet, Take 81 mg by mouth daily, Disp: , Rfl:     fluticasone (FLONASE) 50 MCG/ACT nasal spray, 2 sprays by Each Nostril route daily, Disp: 1 Bottle, Rfl: 0    clonazePAM (KLONOPIN) 1 MG tablet, Take 1 tablet by mouth daily as needed for Anxiety for up to 30 days. , Disp: 30 tablet, Rfl: 1  Patient has no known allergies. Social History     Tobacco Use    Smoking status: Former     Packs/day: 1.50     Years: 37.00     Pack years: 55.50     Types: Cigars, Cigarettes     Quit date: 2020     Years since quittin.8    Smokeless tobacco: Never   Vaping Use    Vaping Use: Never used   Substance Use Topics    Alcohol use: Yes     Alcohol/week: 3.0 standard drinks     Types: 3 Glasses of wine per week    Drug use: No     Family History   Problem Relation Age of Onset    Diabetes Mother     Stroke Mother     Alcohol Abuse Father        Review of Systems   Constitutional: Negative. Negative for activity change and appetite change. HENT:  Positive for ear pain. Negative for congestion, ear discharge, postnasal drip, rhinorrhea, sinus pressure, sinus pain, sore throat and trouble swallowing.          Itching of bilateral ears, right greater than left     Eyes: Negative. Respiratory: Negative. Negative for shortness of breath and stridor. Cardiovascular: Negative. Negative for chest pain and palpitations. Endocrine: Negative. Musculoskeletal: Negative. Skin: Negative. Neurological:  Positive for numbness (Left neck -decreased). Negative for dizziness. Hematological: Negative. Psychiatric/Behavioral: Negative. Ht 6' 1\" (1.854 m)   Wt 205 lb (93 kg)   BMI 27.05 kg/m²   Physical Exam  Constitutional:       Appearance: Normal appearance. HENT:      Head: Normocephalic. Jaw: There is normal jaw occlusion. No tenderness. Right Ear: Tympanic membrane, ear canal and external ear normal.      Left Ear: Tympanic membrane, ear canal and external ear normal.      Ears:        Nose: No rhinorrhea. Right Turbinates: Not swollen or pale. Left Turbinates: Not swollen or pale. Mouth/Throat:      Lips: Pink. Dentition: Has dentures. Pharynx: Oropharynx is clear. No posterior oropharyngeal erythema. Tonsils: 2+ on the right. Eyes:      Conjunctiva/sclera: Conjunctivae normal.      Pupils: Pupils are equal, round, and reactive to light. Neck:     Cardiovascular:      Rate and Rhythm: Normal rate and regular rhythm. Pulses: Normal pulses. Pulmonary:      Effort: Pulmonary effort is normal. No respiratory distress. Breath sounds: No stridor. Musculoskeletal:         General: Normal range of motion. Cervical back: Normal range of motion. No rigidity. No muscular tenderness. Skin:     General: Skin is warm and dry. Neurological:      General: No focal deficit present. Mental Status: He is alert and oriented to person, place, and time. Psychiatric:         Mood and Affect: Mood normal.         Behavior: Behavior normal.         Thought Content:  Thought content normal.         Judgment: Judgment normal.     CT IAC:  HISTORY:   ORDERING SYSTEM PROVIDED HISTORY: Otalgia of right ear   TECHNOLOGIST PROVIDED HISTORY:   Reason for exam:->chronic right ear pain, disorder of the right   temporomandibular joint. FINDINGS:   RIGHT TEMPORAL BONE:  The external auditory canal is clear without evidence   of bony erosion. The scutum is intact. The middle ear cavity is clear. The   ossicular chain is intact. The mastoid air cells are clear. The inner ear   structures appear unremarkable. Normal mineralization of the otic capsule. The internal auditory canal and vestibular aqueduct appear unremarkable. The   carotid canal is normal in appearance. The jugular bulb is unremarkable. LEFT TEMPORAL BONE: The external auditory canal is clear without evidence of   bony erosion. The scutum is intact. The middle ear cavity is clear. The   ossicular chain is intact. The mastoid air cells are clear. The inner ear   structures appear unremarkable. Normal mineralization of the otic capsule. The internal auditory canal and vestibular aqueduct appear unremarkable. The   carotid canal is normal in appearance. The jugular bulb is unremarkable. BRAIN: The visualized portion of the intracranial contents appear   unremarkable. ORBITS: The visualized portion of the orbits demonstrate no acute abnormality. SINUSES: The visualized paranasal sinuses are clear. Impression   Unremarkable appearance of the temporal bones. RECOMMENDATIONS:   Unavailable         IMPRESSION/PLAN:    Elisha Oneil was seen today for follow-up. Diagnoses and all orders for this visit:    Otalgia of right ear    Itching of ear    Tenderness of right temporomandibular joint    Other orders  -     fluocinolone (DERMOTIC) 0.01 % OIL oil; Place 2 drops in ear(s) 2 times daily as needed (itching) Use for itching in ear canals. Stop when symptoms resolved. Scan of the IACs reviewed with the patient with no significant findings.   Images are reviewed with no significant changes or breakdown within the TMJ joint on the right. At this time patient will be placed on DermOtic oil 2 drops twice daily as needed for itching of the right ear canal.  Is also discussed that this may be stemming from an underlying TMJ issue or nerve dysfunction from previous carotid surgery. Patient will at this time follow-up in 1 year and will call for any new or worsening symptoms prior to his next appointment.       Lauren Hudson MSN, FNP-C  8 [facility], Nose and Throat    The information contained in this note has been dictated using drug and medical speech recognition software and may contain errors

## 2023-01-09 ENCOUNTER — OFFICE VISIT (OUTPATIENT)
Dept: FAMILY MEDICINE CLINIC | Age: 58
End: 2023-01-09
Payer: COMMERCIAL

## 2023-01-09 VITALS
OXYGEN SATURATION: 98 % | TEMPERATURE: 97.8 F | HEART RATE: 86 BPM | SYSTOLIC BLOOD PRESSURE: 170 MMHG | DIASTOLIC BLOOD PRESSURE: 102 MMHG | BODY MASS INDEX: 30.35 KG/M2 | HEIGHT: 73 IN | RESPIRATION RATE: 18 BRPM | WEIGHT: 229 LBS

## 2023-01-09 DIAGNOSIS — B96.89 ACUTE BACTERIAL SINUSITIS: Primary | ICD-10-CM

## 2023-01-09 DIAGNOSIS — I10 PRIMARY HYPERTENSION: ICD-10-CM

## 2023-01-09 DIAGNOSIS — J01.90 ACUTE BACTERIAL SINUSITIS: Primary | ICD-10-CM

## 2023-01-09 PROCEDURE — 3077F SYST BP >= 140 MM HG: CPT | Performed by: FAMILY MEDICINE

## 2023-01-09 PROCEDURE — 3080F DIAST BP >= 90 MM HG: CPT | Performed by: FAMILY MEDICINE

## 2023-01-09 PROCEDURE — 99213 OFFICE O/P EST LOW 20 MIN: CPT | Performed by: FAMILY MEDICINE

## 2023-01-09 RX ORDER — AMLODIPINE BESYLATE 5 MG/1
5 TABLET ORAL DAILY
Qty: 30 TABLET | Refills: 5 | Status: SHIPPED | OUTPATIENT
Start: 2023-01-09

## 2023-01-09 RX ORDER — GUAIFENESIN 600 MG/1
600 TABLET, EXTENDED RELEASE ORAL 2 TIMES DAILY
Qty: 30 TABLET | Refills: 0 | Status: SHIPPED | OUTPATIENT
Start: 2023-01-09 | End: 2023-01-24

## 2023-01-09 RX ORDER — AMOXICILLIN AND CLAVULANATE POTASSIUM 875; 125 MG/1; MG/1
1 TABLET, FILM COATED ORAL 2 TIMES DAILY
Qty: 14 TABLET | Refills: 0 | Status: SHIPPED | OUTPATIENT
Start: 2023-01-09 | End: 2023-01-16

## 2023-01-09 NOTE — PROGRESS NOTES
OFFICE NOTE    23  Name: Nicki Yoder  :1965   Sex:male   Age:57 y.o. SUBJECTIVE  Chief Complaint   Patient presents with    Chest Congestion     X 2 wks     Nasal Congestion     X 2 wks     Cough     X 2 wks        HPI Has h/o smoking and carotid occlusive disease    Review of Systems   No fever. Malaise, pnd, productive cough. Not clearing up      Current Outpatient Medications:     amoxicillin-clavulanate (AUGMENTIN) 875-125 MG per tablet, Take 1 tablet by mouth 2 times daily for 7 days, Disp: 14 tablet, Rfl: 0    guaiFENesin (MUCINEX) 600 MG extended release tablet, Take 1 tablet by mouth 2 times daily for 15 days, Disp: 30 tablet, Rfl: 0    amLODIPine (NORVASC) 5 MG tablet, Take 1 tablet by mouth daily, Disp: 30 tablet, Rfl: 5    simvastatin (ZOCOR) 20 MG tablet, Take 1 tablet by mouth nightly, Disp: 90 tablet, Rfl: 1    tadalafil (CIALIS) 5 MG tablet, Take 1 tablet by mouth daily, Disp: 90 tablet, Rfl: 1    fluocinolone (DERMOTIC) 0.01 % OIL oil, Place 2 drops in ear(s) 2 times daily as needed (itching) Use for itching in ear canals. Stop when symptoms resolved., Disp: 1 each, Rfl: 2    azelastine (ASTELIN) 0.1 % nasal spray, 1-2 sprays by Nasal route 2 times daily as needed for Rhinitis Use in each nostril as directed (Patient not taking: Reported on 2023), Disp: 30 mL, Rfl: 1    ondansetron (ZOFRAN ODT) 4 MG disintegrating tablet, Take 1 tablet by mouth 3 times daily as needed for Nausea or Vomiting, Disp: 21 tablet, Rfl: 0    aspirin 81 MG EC tablet, Take 81 mg by mouth daily (Patient not taking: Reported on 2023), Disp: , Rfl:     clonazePAM (KLONOPIN) 1 MG tablet, Take 1 tablet by mouth daily as needed for Anxiety for up to 30 days. , Disp: 30 tablet, Rfl: 1  No Known Allergies    Past Medical History:   Diagnosis Date    Abdominal pain     for ECG and c-scope 21    ADHD (attention deficit hyperactivity disorder)     Bilateral carotid artery stenosis     follows with  Alli annually    COPD (chronic obstructive pulmonary disease) (HCC)     Epicondylitis, lateral     Erectile dysfunction     Esophageal erosions     Fatty liver     Folate deficiency     Gastroesophageal reflux disease without esophagitis 5/29/2019    HSV infection     Hyperlipidemia     Hypertension     BRYCE (renal artery stenosis) (HCC)     right-stent    Vitamin B 12 deficiency      Past Surgical History:   Procedure Laterality Date    BACK SURGERY      ruptured disc    CARDIAC CATHETERIZATION      CAROTID ENDARTERECTOMY  10/2018    COLONOSCOPY      COLONOSCOPY N/A 12/30/2021    COLONOSCOPY WITH BIOPSY performed by Nacho Richards MD at [place] [address] ARTHROSCOPY Left     UPPER GASTROINTESTINAL ENDOSCOPY      UPPER GASTROINTESTINAL ENDOSCOPY N/A 12/30/2021    EGD BIOPSY performed by Nacho Richards MD at [facility] ENDOSCOPY     Family History   Problem Relation Age of Onset    Diabetes Mother     Stroke Mother     Alcohol Abuse Father      Social History       Tobacco History       Smoking Status  Former Quit Date  2/20/2020 Smoking Frequency  1.50 packs/day for 37.00 years (55.50 pk-yrs) Smoking Tobacco Type  Cigarettes quit in 2/20/2020, Cigars      Smokeless Tobacco Use  Never              Alcohol History       Alcohol Use Status  Yes Drinks/Week  3 Glasses of wine per week Amount  3.0 standard drinks of alcohol/wk              Drug Use       Drug Use Status  No              Sexual Activity       Sexually Active  Not Asked                    OBJECTIVE  Vitals:    01/09/23 1509 01/09/23 1516   BP: (!) 162/102 (!) 170/102   Pulse: 86    Resp: 18    Temp: 97.8 °F (36.6 °C)    TempSrc: Temporal    SpO2: 98%    Weight: 229 lb (103.9 kg)    Height: 6' 1\" (1.854 m)         Body mass index is 30.21 kg/m². No orders of the defined types were placed in this encounter. EXAM   Physical Exam  Vitals and nursing note reviewed. Constitutional:       Appearance: Normal appearance. Comments: Overweight. HENT:      Right Ear: Tympanic membrane and external ear normal.      Left Ear: Tympanic membrane and external ear normal.      Nose: Congestion present. Mouth/Throat:      Pharynx: Oropharynx is clear. Posterior oropharyngeal erythema present. Eyes:      Conjunctiva/sclera: Conjunctivae normal.      Pupils: Pupils are equal, round, and reactive to light. Neck:      Comments: Right carotid endarterectomy scar  Cardiovascular:      Rate and Rhythm: Normal rate and regular rhythm. Pulses: Normal pulses. Heart sounds: No murmur heard. Pulmonary:      Effort: Pulmonary effort is normal.      Breath sounds: Rhonchi present. No wheezing or rales. Abdominal:      General: Bowel sounds are normal.      Tenderness: There is no abdominal tenderness. Musculoskeletal:      Cervical back: Tenderness present. Lymphadenopathy:      Cervical: No cervical adenopathy. Skin:     Coloration: Skin is not jaundiced or pale. Findings: No bruising or rash. Neurological:      General: No focal deficit present. Mental Status: He is alert and oriented to person, place, and time. Sonia Rachel was seen today for chest congestion, nasal congestion and cough. Diagnoses and all orders for this visit:    Acute bacterial sinusitis  -     amoxicillin-clavulanate (AUGMENTIN) 875-125 MG per tablet; Take 1 tablet by mouth 2 times daily for 7 days  -     guaiFENesin (MUCINEX) 600 MG extended release tablet; Take 1 tablet by mouth 2 times daily for 15 days  Denies decongestants in past 48 hours  Primary hypertension  -     amLODIPine (NORVASC) 5 MG tablet; Take 1 tablet by mouth daily  Stayed up on recheck. Has gained significant weight last several mos. Will start him on amlodipine 5 mg per day. Has apt to establish with Dr. Charlie Mo      No follow-ups on file.     Electronically signed by Kei Melo MD on 1/9/23 at 3:22 PM EST

## 2023-04-19 ENCOUNTER — OFFICE VISIT (OUTPATIENT)
Dept: FAMILY MEDICINE CLINIC | Age: 58
End: 2023-04-19
Payer: COMMERCIAL

## 2023-04-19 VITALS
BODY MASS INDEX: 29.55 KG/M2 | SYSTOLIC BLOOD PRESSURE: 124 MMHG | OXYGEN SATURATION: 93 % | TEMPERATURE: 98.1 F | WEIGHT: 224 LBS | DIASTOLIC BLOOD PRESSURE: 70 MMHG | HEART RATE: 76 BPM

## 2023-04-19 DIAGNOSIS — I65.23 BILATERAL CAROTID ARTERY STENOSIS: ICD-10-CM

## 2023-04-19 DIAGNOSIS — Z87.891 PERSONAL HISTORY OF TOBACCO USE: ICD-10-CM

## 2023-04-19 DIAGNOSIS — Z12.5 PROSTATE CANCER SCREENING: ICD-10-CM

## 2023-04-19 DIAGNOSIS — E78.49 OTHER HYPERLIPIDEMIA: ICD-10-CM

## 2023-04-19 DIAGNOSIS — R73.03 PRE-DIABETES: ICD-10-CM

## 2023-04-19 DIAGNOSIS — F41.9 ANXIETY: ICD-10-CM

## 2023-04-19 DIAGNOSIS — Z00.00 WELL ADULT EXAM: Primary | ICD-10-CM

## 2023-04-19 DIAGNOSIS — J41.0 SIMPLE CHRONIC BRONCHITIS (HCC): ICD-10-CM

## 2023-04-19 PROCEDURE — 3078F DIAST BP <80 MM HG: CPT | Performed by: STUDENT IN AN ORGANIZED HEALTH CARE EDUCATION/TRAINING PROGRAM

## 2023-04-19 PROCEDURE — G0296 VISIT TO DETERM LDCT ELIG: HCPCS | Performed by: STUDENT IN AN ORGANIZED HEALTH CARE EDUCATION/TRAINING PROGRAM

## 2023-04-19 PROCEDURE — 3074F SYST BP LT 130 MM HG: CPT | Performed by: STUDENT IN AN ORGANIZED HEALTH CARE EDUCATION/TRAINING PROGRAM

## 2023-04-19 PROCEDURE — 99396 PREV VISIT EST AGE 40-64: CPT | Performed by: STUDENT IN AN ORGANIZED HEALTH CARE EDUCATION/TRAINING PROGRAM

## 2023-04-19 RX ORDER — TADALAFIL 5 MG/1
5 TABLET ORAL DAILY
Qty: 90 TABLET | Refills: 1 | Status: SHIPPED | OUTPATIENT
Start: 2023-04-19

## 2023-04-19 RX ORDER — CLONAZEPAM 0.5 MG/1
0.5 TABLET ORAL DAILY PRN
Qty: 30 TABLET | Refills: 0 | Status: SHIPPED | OUTPATIENT
Start: 2023-04-19 | End: 2023-05-19

## 2023-04-19 RX ORDER — SIMVASTATIN 20 MG
20 TABLET ORAL NIGHTLY
Qty: 90 TABLET | Refills: 1 | Status: SHIPPED | OUTPATIENT
Start: 2023-04-19

## 2023-04-19 SDOH — ECONOMIC STABILITY: FOOD INSECURITY: WITHIN THE PAST 12 MONTHS, THE FOOD YOU BOUGHT JUST DIDN'T LAST AND YOU DIDN'T HAVE MONEY TO GET MORE.: NEVER TRUE

## 2023-04-19 SDOH — ECONOMIC STABILITY: HOUSING INSECURITY
IN THE LAST 12 MONTHS, WAS THERE A TIME WHEN YOU DID NOT HAVE A STEADY PLACE TO SLEEP OR SLEPT IN A SHELTER (INCLUDING NOW)?: NO

## 2023-04-19 SDOH — ECONOMIC STABILITY: FOOD INSECURITY: WITHIN THE PAST 12 MONTHS, YOU WORRIED THAT YOUR FOOD WOULD RUN OUT BEFORE YOU GOT MONEY TO BUY MORE.: NEVER TRUE

## 2023-04-19 SDOH — ECONOMIC STABILITY: INCOME INSECURITY: HOW HARD IS IT FOR YOU TO PAY FOR THE VERY BASICS LIKE FOOD, HOUSING, MEDICAL CARE, AND HEATING?: NOT HARD AT ALL

## 2023-04-19 ASSESSMENT — PATIENT HEALTH QUESTIONNAIRE - PHQ9
2. FEELING DOWN, DEPRESSED OR HOPELESS: 0
SUM OF ALL RESPONSES TO PHQ QUESTIONS 1-9: 0
1. LITTLE INTEREST OR PLEASURE IN DOING THINGS: 0
SUM OF ALL RESPONSES TO PHQ9 QUESTIONS 1 & 2: 0

## 2023-04-19 NOTE — PROGRESS NOTES
deficiency     Gastroesophageal reflux disease without esophagitis 5/29/2019    HSV infection     Hyperlipidemia     Hypertension     BRYCE (renal artery stenosis) (HCC)     right-stent    Vitamin B 12 deficiency        PastSurgical History:        Procedure Laterality Date    BACK SURGERY      ruptured disc    CARDIAC CATHETERIZATION      CAROTID ENDARTERECTOMY  10/2018    COLONOSCOPY      COLONOSCOPY N/A 12/30/2021    COLONOSCOPY WITH BIOPSY performed by Jad Pulido MD at [place] [address] ARTHROSCOPY Left     UPPER GASTROINTESTINAL ENDOSCOPY      UPPER GASTROINTESTINAL ENDOSCOPY N/A 12/30/2021    EGD BIOPSY performed by Jad Pulido MD at [facility] ENDOSCOPY       Allergies:    Patient has no known allergies. Social History:   Social History     Socioeconomic History    Marital status:      Spouse name: Not on file    Number of children: Not on file    Years of education: Not on file    Highest education level: Not on file   Occupational History    Not on file   Tobacco Use    Smoking status: Former     Packs/day: 1.50     Years: 37.00     Pack years: 55.50     Types: Cigars, Cigarettes     Quit date: 2/20/2020     Years since quitting: 3.1    Smokeless tobacco: Never   Vaping Use    Vaping Use: Never used   Substance and Sexual Activity    Alcohol use: Yes     Alcohol/week: 3.0 standard drinks     Types: 3 Glasses of wine per week    Drug use: No    Sexual activity: Not on file   Other Topics Concern    Not on file   Social History Narrative    Not on file     Social Determinants of Health     Financial Resource Strain: Low Risk     Difficulty of Paying Living Expenses: Not hard at all   Food Insecurity: No Food Insecurity    Worried About [address] in the Last Year: Never true    [address] in the Last Year: Never true   Transportation Needs: Unknown    Lack of Transportation (Medical): Not on file    Lack of Transportation (Non-Medical):  No   Physical

## 2023-04-20 PROBLEM — R73.03 PRE-DIABETES: Status: ACTIVE | Noted: 2023-04-20

## 2023-04-20 PROBLEM — F41.9 ANXIETY: Status: ACTIVE | Noted: 2023-04-20

## 2023-05-08 DIAGNOSIS — Z87.891 PERSONAL HISTORY OF TOBACCO USE: ICD-10-CM

## 2023-05-12 DIAGNOSIS — R73.03 PRE-DIABETES: ICD-10-CM

## 2023-05-12 DIAGNOSIS — Z12.5 PROSTATE CANCER SCREENING: ICD-10-CM

## 2023-05-12 DIAGNOSIS — E78.49 OTHER HYPERLIPIDEMIA: ICD-10-CM

## 2023-05-12 LAB
ALBUMIN SERPL-MCNC: 4.2 G/DL (ref 3.5–5.2)
ALP SERPL-CCNC: 76 U/L (ref 40–129)
ALT SERPL-CCNC: 32 U/L (ref 0–40)
ANION GAP SERPL CALCULATED.3IONS-SCNC: 18 MMOL/L (ref 7–16)
AST SERPL-CCNC: 26 U/L (ref 0–39)
BASOPHILS # BLD: 0.05 E9/L (ref 0–0.2)
BASOPHILS NFR BLD: 0.6 % (ref 0–2)
BILIRUB SERPL-MCNC: <0.2 MG/DL (ref 0–1.2)
BUN SERPL-MCNC: 12 MG/DL (ref 6–20)
CALCIUM SERPL-MCNC: 9.9 MG/DL (ref 8.6–10.2)
CHLORIDE SERPL-SCNC: 107 MMOL/L (ref 98–107)
CHOLESTEROL, TOTAL: 206 MG/DL (ref 0–199)
CO2 SERPL-SCNC: 17 MMOL/L (ref 22–29)
CREAT SERPL-MCNC: 0.8 MG/DL (ref 0.7–1.2)
EOSINOPHIL # BLD: 0.28 E9/L (ref 0.05–0.5)
EOSINOPHIL NFR BLD: 3.3 % (ref 0–6)
ERYTHROCYTE [DISTWIDTH] IN BLOOD BY AUTOMATED COUNT: 13.3 FL (ref 11.5–15)
GLUCOSE SERPL-MCNC: 119 MG/DL (ref 74–99)
HBA1C MFR BLD: 5.9 % (ref 4–5.6)
HCT VFR BLD AUTO: 41.9 % (ref 37–54)
HDLC SERPL-MCNC: 37 MG/DL
HGB BLD-MCNC: 13.7 G/DL (ref 12.5–16.5)
IMM GRANULOCYTES # BLD: 0.02 E9/L
IMM GRANULOCYTES NFR BLD: 0.2 % (ref 0–5)
LDLC SERPL CALC-MCNC: 92 MG/DL (ref 0–99)
LYMPHOCYTES # BLD: 2.42 E9/L (ref 1.5–4)
LYMPHOCYTES NFR BLD: 28.8 % (ref 20–42)
MCH RBC QN AUTO: 30.7 PG (ref 26–35)
MCHC RBC AUTO-ENTMCNC: 32.7 % (ref 32–34.5)
MCV RBC AUTO: 93.9 FL (ref 80–99.9)
MONOCYTES # BLD: 0.66 E9/L (ref 0.1–0.95)
MONOCYTES NFR BLD: 7.9 % (ref 2–12)
NEUTROPHILS # BLD: 4.97 E9/L (ref 1.8–7.3)
NEUTS SEG NFR BLD: 59.2 % (ref 43–80)
PLATELET # BLD AUTO: 301 E9/L (ref 130–450)
PMV BLD AUTO: 12 FL (ref 7–12)
POTASSIUM SERPL-SCNC: 4.4 MMOL/L (ref 3.5–5)
PROT SERPL-MCNC: 7.2 G/DL (ref 6.4–8.3)
PSA SERPL-MCNC: 0.74 NG/ML (ref 0–4)
RBC # BLD AUTO: 4.46 E12/L (ref 3.8–5.8)
SODIUM SERPL-SCNC: 142 MMOL/L (ref 132–146)
TRIGL SERPL-MCNC: 385 MG/DL (ref 0–149)
VLDLC SERPL CALC-MCNC: 77 MG/DL
WBC # BLD: 8.4 E9/L (ref 4.5–11.5)

## 2023-05-15 ENCOUNTER — PATIENT MESSAGE (OUTPATIENT)
Dept: FAMILY MEDICINE CLINIC | Age: 58
End: 2023-05-15

## 2023-05-15 DIAGNOSIS — I71.9 AORTIC ANEURYSM WITHOUT RUPTURE, UNSPECIFIED PORTION OF AORTA (HCC): Primary | ICD-10-CM

## 2023-05-25 ENCOUNTER — TELEPHONE (OUTPATIENT)
Dept: FAMILY MEDICINE CLINIC | Age: 58
End: 2023-05-25

## 2023-05-25 DIAGNOSIS — I71.9 AORTIC ANEURYSM WITHOUT RUPTURE, UNSPECIFIED PORTION OF AORTA (HCC): Primary | ICD-10-CM

## 2023-05-25 NOTE — TELEPHONE ENCOUNTER
Dr Ezekiel Milton office called and said they Matthew Taylor needs to see Cardio Thoracic, and not vascular. Dr Gardner

## 2023-05-25 NOTE — TELEPHONE ENCOUNTER
Please inform Lucian Snell of Dr. Ry Gamboa office message and let him know I placed a referral to see Dr. Noam Robin is a cardiothoracic surgeon

## 2023-06-06 ENCOUNTER — INITIAL CONSULT (OUTPATIENT)
Dept: CARDIOTHORACIC SURGERY | Age: 58
End: 2023-06-06
Payer: COMMERCIAL

## 2023-06-06 VITALS
HEART RATE: 61 BPM | SYSTOLIC BLOOD PRESSURE: 138 MMHG | DIASTOLIC BLOOD PRESSURE: 90 MMHG | HEIGHT: 73 IN | BODY MASS INDEX: 27.17 KG/M2 | WEIGHT: 205 LBS

## 2023-06-06 DIAGNOSIS — I71.21 ANEURYSM OF ASCENDING AORTA WITHOUT RUPTURE (HCC): Primary | ICD-10-CM

## 2023-06-06 PROCEDURE — 3080F DIAST BP >= 90 MM HG: CPT | Performed by: THORACIC SURGERY (CARDIOTHORACIC VASCULAR SURGERY)

## 2023-06-06 PROCEDURE — 99203 OFFICE O/P NEW LOW 30 MIN: CPT | Performed by: THORACIC SURGERY (CARDIOTHORACIC VASCULAR SURGERY)

## 2023-06-06 PROCEDURE — 3075F SYST BP GE 130 - 139MM HG: CPT | Performed by: THORACIC SURGERY (CARDIOTHORACIC VASCULAR SURGERY)

## 2023-08-22 ENCOUNTER — OFFICE VISIT (OUTPATIENT)
Dept: FAMILY MEDICINE CLINIC | Age: 58
End: 2023-08-22
Payer: COMMERCIAL

## 2023-08-22 VITALS
DIASTOLIC BLOOD PRESSURE: 76 MMHG | TEMPERATURE: 97.5 F | BODY MASS INDEX: 28.23 KG/M2 | SYSTOLIC BLOOD PRESSURE: 122 MMHG | WEIGHT: 214 LBS | OXYGEN SATURATION: 95 % | HEART RATE: 74 BPM

## 2023-08-22 DIAGNOSIS — R10.13 EPIGASTRIC PAIN: ICD-10-CM

## 2023-08-22 DIAGNOSIS — K21.9 GASTROESOPHAGEAL REFLUX DISEASE, UNSPECIFIED WHETHER ESOPHAGITIS PRESENT: Primary | ICD-10-CM

## 2023-08-22 DIAGNOSIS — M51.36 BULGING LUMBAR DISC: ICD-10-CM

## 2023-08-22 LAB
ALBUMIN SERPL-MCNC: 5.1 G/DL (ref 3.5–5.2)
ALP BLD-CCNC: 73 U/L (ref 40–129)
ALT SERPL-CCNC: 35 U/L (ref 0–40)
ANION GAP SERPL CALCULATED.3IONS-SCNC: 12 MMOL/L (ref 7–16)
AST SERPL-CCNC: 40 U/L (ref 0–39)
BILIRUB SERPL-MCNC: 0.5 MG/DL (ref 0–1.2)
BUN BLDV-MCNC: 9 MG/DL (ref 6–20)
CALCIUM SERPL-MCNC: 10 MG/DL (ref 8.6–10.2)
CHLORIDE BLD-SCNC: 103 MMOL/L (ref 98–107)
CO2: 23 MMOL/L (ref 22–29)
CREAT SERPL-MCNC: 0.8 MG/DL (ref 0.7–1.2)
GFR SERPL CREATININE-BSD FRML MDRD: >60 ML/MIN/1.73M2
GLUCOSE BLD-MCNC: 104 MG/DL (ref 74–99)
LIPASE: 26 U/L (ref 13–60)
POTASSIUM SERPL-SCNC: 4.1 MMOL/L (ref 3.5–5)
SODIUM BLD-SCNC: 138 MMOL/L (ref 132–146)
TOTAL PROTEIN: 8.1 G/DL (ref 6.4–8.3)

## 2023-08-22 PROCEDURE — 3074F SYST BP LT 130 MM HG: CPT | Performed by: STUDENT IN AN ORGANIZED HEALTH CARE EDUCATION/TRAINING PROGRAM

## 2023-08-22 PROCEDURE — 99214 OFFICE O/P EST MOD 30 MIN: CPT | Performed by: STUDENT IN AN ORGANIZED HEALTH CARE EDUCATION/TRAINING PROGRAM

## 2023-08-22 PROCEDURE — 3078F DIAST BP <80 MM HG: CPT | Performed by: STUDENT IN AN ORGANIZED HEALTH CARE EDUCATION/TRAINING PROGRAM

## 2023-08-22 RX ORDER — PANTOPRAZOLE SODIUM 40 MG/1
40 TABLET, DELAYED RELEASE ORAL
Qty: 30 TABLET | Refills: 2 | Status: SHIPPED | OUTPATIENT
Start: 2023-08-22

## 2023-08-22 ASSESSMENT — ENCOUNTER SYMPTOMS
VOMITING: 0
COUGH: 0
RHINORRHEA: 0
ABDOMINAL PAIN: 1
BACK PAIN: 1
SHORTNESS OF BREATH: 0
NAUSEA: 0

## 2023-08-22 NOTE — PROGRESS NOTES
[facility]  Office Progress Note  Dr. Puma Campoverde      Patient:  Sylvia Limon 60 y.o. male     Date of Service: 8/22/23      Chief complaint:   Chief Complaint   Patient presents with    Gastroesophageal Reflux         History of Present Illness   The patient is a 60 y.o. male  here to follow up of their epigastric pain    Having really bad heartburn for the last month. Would wake him up at night. Laying on one side over the other would help. Not sure which one. Has improved lately. Stress increased in recent months. Denies dark, tarry, bloody stool      He has chronic back pain hx of multiple surgeries. Had MRI done earlier this summer which shows post surgical changes as well as disc bulging. He would like to consider getting injections. He has done PT int he past and didn't feel like he got a lot of benefit. Open to trying it again if it needs to be done before injections    Past Medical History:      Diagnosis Date    Abdominal pain     for ECG and c-scope 12/30/21    ADHD (attention deficit hyperactivity disorder)     Bilateral carotid artery stenosis     follows with Dr Breanna Pierre annually    COPD (chronic obstructive pulmonary disease) ([address])     Epicondylitis, lateral     Erectile dysfunction     Esophageal erosions     Fatty liver     Folate deficiency     Gastroesophageal reflux disease without esophagitis 5/29/2019    HSV infection     Hyperlipidemia     Hypertension     BRYCE (renal artery stenosis) (HCC)     right-stent    Vitamin B 12 deficiency        Review of Systems:   Review of Systems   Constitutional:  Negative for chills and fever. HENT:  Negative for congestion and rhinorrhea. Respiratory:  Negative for cough and shortness of breath. Cardiovascular:  Negative for chest pain and leg swelling. Gastrointestinal:  Positive for abdominal pain. Negative for nausea and vomiting. Genitourinary:  Negative for dysuria and hematuria.    Musculoskeletal:  Positive for back

## 2023-09-14 ENCOUNTER — OFFICE VISIT (OUTPATIENT)
Dept: PAIN MANAGEMENT | Age: 58
End: 2023-09-14
Payer: COMMERCIAL

## 2023-09-14 VITALS
HEART RATE: 84 BPM | WEIGHT: 215 LBS | HEIGHT: 73 IN | RESPIRATION RATE: 16 BRPM | TEMPERATURE: 97.2 F | SYSTOLIC BLOOD PRESSURE: 140 MMHG | DIASTOLIC BLOOD PRESSURE: 88 MMHG | BODY MASS INDEX: 28.49 KG/M2 | OXYGEN SATURATION: 96 %

## 2023-09-14 DIAGNOSIS — M54.16 LUMBAR RADICULOPATHY: ICD-10-CM

## 2023-09-14 DIAGNOSIS — M48.062 SPINAL STENOSIS OF LUMBAR REGION WITH NEUROGENIC CLAUDICATION: Primary | ICD-10-CM

## 2023-09-14 DIAGNOSIS — M96.1 POST LAMINECTOMY SYNDROME: ICD-10-CM

## 2023-09-14 DIAGNOSIS — G89.29 CHRONIC MIDLINE LOW BACK PAIN WITH RIGHT-SIDED SCIATICA: ICD-10-CM

## 2023-09-14 DIAGNOSIS — M54.41 CHRONIC MIDLINE LOW BACK PAIN WITH RIGHT-SIDED SCIATICA: ICD-10-CM

## 2023-09-14 PROCEDURE — 3079F DIAST BP 80-89 MM HG: CPT | Performed by: STUDENT IN AN ORGANIZED HEALTH CARE EDUCATION/TRAINING PROGRAM

## 2023-09-14 PROCEDURE — 99204 OFFICE O/P NEW MOD 45 MIN: CPT | Performed by: STUDENT IN AN ORGANIZED HEALTH CARE EDUCATION/TRAINING PROGRAM

## 2023-09-14 PROCEDURE — 3077F SYST BP >= 140 MM HG: CPT | Performed by: STUDENT IN AN ORGANIZED HEALTH CARE EDUCATION/TRAINING PROGRAM

## 2023-09-14 RX ORDER — GABAPENTIN 300 MG/1
CAPSULE ORAL
Qty: 81 CAPSULE | Refills: 0 | Status: SHIPPED | OUTPATIENT
Start: 2023-09-14 | End: 2023-10-14

## 2023-09-14 NOTE — PROGRESS NOTES
[address] [place] [address]  Pain Medicine Consult Note    Patient:  Bryanna Yost,  1965  Date of Service:  23  Requesting Physician:  Placido Martin MD  Chief Complaint: New Patient (Lumbar pain )      HISTORY OF PRESENT ILLNESS:      Mr. Bryanna Yost is a 60 y.o. male presented today for evaluation of  low back , right leg pain that has been ongoing for the past 20 years but 3 months worse    He   has a past medical history of ADHD, Bilateral carotid artery stenosis , COPD, Epicondylitis, lateral,  Esophageal erosions, Fatty liver, GERD, HLD, HTN, BRYCE. Pain:  Location: low back, right leg pain   Inciting Factor: unsure  Duration: 20 years, 3 months worse  Description: constant  Quality: aching and burning. Level (worst): 7  Radiation:  yes - right leg to ankle   Numbness/Tingling: yes - right leg to thigh, and at times to calf/ankle  Aggravating factors: walking, standing. Alleviating factors: acetaminophen.     Blood Thinners/Anticoagulation:  yes - ASA 81  Herbal Supplements: Fish oil, CoQ10  Pertinent Allergies: no  Diabetic: no  Bowel/Bladder Incontinence: no    Medications:    NSAID's : yes, ibuprofen  APAPs: yes - tylenol    Patches/Gels: yes, biofreeze  Membrane stabilizers :   Current - no   Previous - no   Opioids :   Current - no   Previous - no   Muscle Relaxants: no  Steroids: yes - Medrol   Benzodiazepines: no   Anti-depres/Anti-Pscyh: no   Adjuvants or Others : yes, melatonin       Previous treatments:    Physical Therapy : yes,     Chiropractic treatment: no   TENS Unit: yes, with PT   Surgeries: yes,   [facility]   2017 [facility]  Interventional Pain procedures/ nerve blocks: yes, [place] - epidural    Previous Pain Physicians: yes - [place]    Any Discharges: no   Epidurals: yes - some relief      Current Medications:   aspirin, fluocinolone, gabapentin,

## 2023-09-27 NOTE — PROGRESS NOTES
[facility]   Phone: [phone]   Fax: [phone]           Date:  2023   Patient: Gerardo Torres  : 1965  MRN: [id number]  Referring Provider: Facundo Quinonez MD  [facility],  [facility]     Medical Diagnosis:   V98.367 (ICD-10-CM) - Spinal stenosis of lumbar region with neurogenic claudication   M54.16 (ICD-10-CM) - Lumbar radiculopathy   M96.1 (ICD-10-CM) - Post laminectomy syndrome   M54.41, G89.29 (ICD-10-CM) - Chronic midline low back pain with right-sided sciatica       SUBJECTIVE:     Onset date: 30  years       Mechanism of Injury: pt reports history of 2 back surgeries L4-5 in . Reports about  flared up again. Had MRI , had PT, reports estim helped. Pt reports has low back and pain down R LE now. Doctor prescribed gabapentin which has helped the nerve pain a lot. Previous PT: yes - helped    Medical Management for Current Problem: gabapentin     Chief complaint: pain    Behavior: condition is getting better since new medication     Pain:   Current: 5/10      Worst:12/10    Symptom Type/Quality: numbness stinging down R LE   Location[de-identified] Back: lumbar region radiates down R LE. Provoking Activities/Positions:  lying on side  , walking                  Relieving Activitie/Positions: medication, sitting     Disturbed Sleep: a little bit was a lot before. Bladder Dysfunction: no  Bowel Dysfunction: no     Imaging results: No results found.     Past Medical History:  Past Medical History:   Diagnosis Date    Abdominal pain     for ECG and c-scope 21    ADHD (attention deficit hyperactivity disorder)     Bilateral carotid artery stenosis     follows with Dr Seth Javed annually    COPD (chronic obstructive pulmonary disease) ([address])     Epicondylitis, lateral     Erectile dysfunction     Esophageal erosions     Fatty liver     Folate deficiency     Gastroesophageal reflux disease without esophagitis 2019    HSV

## 2023-09-28 ENCOUNTER — EVALUATION (OUTPATIENT)
Dept: PHYSICAL THERAPY | Age: 58
End: 2023-09-28
Payer: COMMERCIAL

## 2023-09-28 DIAGNOSIS — M54.41 CHRONIC MIDLINE LOW BACK PAIN WITH RIGHT-SIDED SCIATICA: ICD-10-CM

## 2023-09-28 DIAGNOSIS — M54.16 LUMBAR RADICULOPATHY: ICD-10-CM

## 2023-09-28 DIAGNOSIS — M48.062 SPINAL STENOSIS OF LUMBAR REGION WITH NEUROGENIC CLAUDICATION: Primary | ICD-10-CM

## 2023-09-28 DIAGNOSIS — M96.1 POST LAMINECTOMY SYNDROME: ICD-10-CM

## 2023-09-28 DIAGNOSIS — G89.29 CHRONIC MIDLINE LOW BACK PAIN WITH RIGHT-SIDED SCIATICA: ICD-10-CM

## 2023-09-28 PROCEDURE — 97161 PT EVAL LOW COMPLEX 20 MIN: CPT | Performed by: PHYSICAL THERAPIST

## 2023-09-28 NOTE — PROGRESS NOTES
[address] [facility]   Phone: [phone]   Fax: [phone]      Physical Therapy Treatment Note    Date: 2023  Patient Name: Gurvinder Fry  : 1965   MRN: [id number]  DOInjury: years   DOSx:    Referring Provider: MD Laron Coy [place],  [facility] Diagnosis:     E78.861 (ICD-10-CM) - Spinal stenosis of lumbar region with neurogenic claudication   M54.16 (ICD-10-CM) - Lumbar radiculopathy   M96.1 (ICD-10-CM) - Post laminectomy syndrome   M54.41, G89.29 (ICD-10-CM) - Chronic midline low back pain with right-sided sciatica          Outcome Measure:  Oswestry pt to return next session. S: see eval  O:  Time [id number]     Visit - Repeat outcome measure at mid point and end. Pain -12/10     ROM      Modalities      MH + ES            Exercise      ALL EXERCISE DONE WITH DRAW-IN TECHNIQUE                            Functional activities To aid in reaching , pushing, pulling tasks at home     ROWS: H  \"    ROWS: M  \"    ROWS: L  \"    Obliques - high  \"    Obliques - low  \"     THEREX     Bike      Punches      Lat pulldowns      Triceps ext standing      Marching            Trunk ext TB      Trunk flex TB      Hip abd      Hip EXT      TG Squats                  A:  Tolerated well.     P: Continue with rehab plan  Sienna Maria, PT  PT DPT [id number]    Treatment Charges: Mins Units   Initial Evaluation 25 1   Re-Evaluation     Ther Exercise         TE     Manual Therapy     MT     Ther Activities        TA     Gait Training          GT     Neuro Re-education NR     Modalities     Non-Billable Service Time     Other     Total Time/Units 25 1

## 2023-10-13 ENCOUNTER — OFFICE VISIT (OUTPATIENT)
Dept: PAIN MANAGEMENT | Age: 58
End: 2023-10-13
Payer: COMMERCIAL

## 2023-10-13 ENCOUNTER — OFFICE VISIT (OUTPATIENT)
Dept: FAMILY MEDICINE CLINIC | Age: 58
End: 2023-10-13
Payer: COMMERCIAL

## 2023-10-13 VITALS
SYSTOLIC BLOOD PRESSURE: 118 MMHG | HEIGHT: 73 IN | TEMPERATURE: 98.2 F | BODY MASS INDEX: 28.49 KG/M2 | OXYGEN SATURATION: 96 % | DIASTOLIC BLOOD PRESSURE: 78 MMHG | HEART RATE: 73 BPM | WEIGHT: 215 LBS | RESPIRATION RATE: 16 BRPM

## 2023-10-13 VITALS
BODY MASS INDEX: 28.49 KG/M2 | DIASTOLIC BLOOD PRESSURE: 78 MMHG | SYSTOLIC BLOOD PRESSURE: 118 MMHG | HEIGHT: 73 IN | WEIGHT: 215 LBS | OXYGEN SATURATION: 96 % | HEART RATE: 73 BPM | TEMPERATURE: 98.2 F

## 2023-10-13 DIAGNOSIS — M54.16 LUMBAR RADICULOPATHY: ICD-10-CM

## 2023-10-13 DIAGNOSIS — E78.49 OTHER HYPERLIPIDEMIA: ICD-10-CM

## 2023-10-13 DIAGNOSIS — Z01.818 PRE-OP EXAM: ICD-10-CM

## 2023-10-13 DIAGNOSIS — M96.1 POST LAMINECTOMY SYNDROME: ICD-10-CM

## 2023-10-13 DIAGNOSIS — M48.062 SPINAL STENOSIS OF LUMBAR REGION WITH NEUROGENIC CLAUDICATION: Primary | ICD-10-CM

## 2023-10-13 DIAGNOSIS — K21.9 GASTROESOPHAGEAL REFLUX DISEASE, UNSPECIFIED WHETHER ESOPHAGITIS PRESENT: ICD-10-CM

## 2023-10-13 DIAGNOSIS — R10.13 EPIGASTRIC PAIN: ICD-10-CM

## 2023-10-13 DIAGNOSIS — G89.29 CHRONIC MIDLINE LOW BACK PAIN WITH RIGHT-SIDED SCIATICA: ICD-10-CM

## 2023-10-13 DIAGNOSIS — M54.41 CHRONIC MIDLINE LOW BACK PAIN WITH RIGHT-SIDED SCIATICA: ICD-10-CM

## 2023-10-13 DIAGNOSIS — Z01.818 PRE-OP EXAM: Primary | ICD-10-CM

## 2023-10-13 LAB
ABSOLUTE IMMATURE GRANULOCYTE: <0.03 K/UL (ref 0–0.58)
ALBUMIN SERPL-MCNC: 4.8 G/DL (ref 3.5–5.2)
ALP BLD-CCNC: 87 U/L (ref 40–129)
ALT SERPL-CCNC: 21 U/L (ref 0–40)
ANION GAP SERPL CALCULATED.3IONS-SCNC: 19 MMOL/L (ref 7–16)
AST SERPL-CCNC: 17 U/L (ref 0–39)
BASOPHILS ABSOLUTE: 0.05 K/UL (ref 0–0.2)
BASOPHILS RELATIVE PERCENT: 1 % (ref 0–2)
BILIRUB SERPL-MCNC: 0.4 MG/DL (ref 0–1.2)
BUN BLDV-MCNC: 10 MG/DL (ref 6–20)
CALCIUM SERPL-MCNC: 9.9 MG/DL (ref 8.6–10.2)
CHLORIDE BLD-SCNC: 104 MMOL/L (ref 98–107)
CO2: 19 MMOL/L (ref 22–29)
CREAT SERPL-MCNC: 0.9 MG/DL (ref 0.7–1.2)
EOSINOPHILS ABSOLUTE: 0.13 K/UL (ref 0.05–0.5)
EOSINOPHILS RELATIVE PERCENT: 1 % (ref 0–6)
GFR SERPL CREATININE-BSD FRML MDRD: >60 ML/MIN/1.73M2
GLUCOSE BLD-MCNC: 96 MG/DL (ref 74–99)
HCT VFR BLD CALC: 44.9 % (ref 37–54)
HEMOGLOBIN: 14.3 G/DL (ref 12.5–16.5)
IMMATURE GRANULOCYTES: 0 % (ref 0–5)
INR BLD: 1
LYMPHOCYTES ABSOLUTE: 2.47 K/UL (ref 1.5–4)
LYMPHOCYTES RELATIVE PERCENT: 27 % (ref 20–42)
MCH RBC QN AUTO: 30.5 PG (ref 26–35)
MCHC RBC AUTO-ENTMCNC: 31.8 G/DL (ref 32–34.5)
MCV RBC AUTO: 95.7 FL (ref 80–99.9)
MONOCYTES ABSOLUTE: 0.68 K/UL (ref 0.1–0.95)
MONOCYTES RELATIVE PERCENT: 7 % (ref 2–12)
NEUTROPHILS ABSOLUTE: 5.91 K/UL (ref 1.8–7.3)
NEUTROPHILS RELATIVE PERCENT: 64 % (ref 43–80)
PDW BLD-RTO: 13.3 % (ref 11.5–15)
PLATELET # BLD: 297 K/UL (ref 130–450)
PMV BLD AUTO: 12 FL (ref 7–12)
POTASSIUM SERPL-SCNC: 4.4 MMOL/L (ref 3.5–5)
PROTHROMBIN TIME: 10.9 SEC (ref 9.3–12.4)
RBC # BLD: 4.69 M/UL (ref 3.8–5.8)
SODIUM BLD-SCNC: 142 MMOL/L (ref 132–146)
TOTAL PROTEIN: 8.1 G/DL (ref 6.4–8.3)
WBC # BLD: 9.3 K/UL (ref 4.5–11.5)

## 2023-10-13 PROCEDURE — 3074F SYST BP LT 130 MM HG: CPT | Performed by: STUDENT IN AN ORGANIZED HEALTH CARE EDUCATION/TRAINING PROGRAM

## 2023-10-13 PROCEDURE — 99214 OFFICE O/P EST MOD 30 MIN: CPT | Performed by: STUDENT IN AN ORGANIZED HEALTH CARE EDUCATION/TRAINING PROGRAM

## 2023-10-13 PROCEDURE — 3078F DIAST BP <80 MM HG: CPT | Performed by: STUDENT IN AN ORGANIZED HEALTH CARE EDUCATION/TRAINING PROGRAM

## 2023-10-13 PROCEDURE — 93000 ELECTROCARDIOGRAM COMPLETE: CPT | Performed by: STUDENT IN AN ORGANIZED HEALTH CARE EDUCATION/TRAINING PROGRAM

## 2023-10-13 RX ORDER — PANTOPRAZOLE SODIUM 40 MG/1
40 TABLET, DELAYED RELEASE ORAL
Qty: 90 TABLET | Refills: 3 | Status: SHIPPED | OUTPATIENT
Start: 2023-10-13

## 2023-10-13 RX ORDER — SIMVASTATIN 20 MG
20 TABLET ORAL NIGHTLY
Qty: 90 TABLET | Refills: 1 | Status: SHIPPED | OUTPATIENT
Start: 2023-10-13

## 2023-10-13 NOTE — PROGRESS NOTES
[address] [facility], [address] [place] [address]    Pain Medicine Follow Up Note      Bj Sanderson     Date of Visit:  10/13/2023    CC:  Patient presents for follow up   Chief Complaint   Patient presents with    Follow-up     Lower back        HPI:    Pain is better. Medication side effects:none. Recent interventional procedures:none. .  Blood Thinners/Anticoagulation:  yes - ASA 81  Herbal Supplements: Fish oil, CoQ10  Pertinent Allergies: no  Diabetic: no  Bowel/Bladder Incontinence: no    Previous Plan:  PT- ongoing  MRI Disc review - done  Gabapentin 300mg - taking QHS with great relief  F/U NSGY - done - planning for surgery     Interval Changes:  Feels much better with the gabapentin QHS  MRI reviewed, consistent with report  Seen by neurosurgery [facility]-Dr. Elbert Granados for L4 and L5 likely discectomies and possible laminectomies per the patient report  Would like to hold off on fusion    Procedures:        Previous Treatments:   ibuprofen, tylenol, biofreeze, surgeries, epidural injections  . Potential Aberrant Drug-Related Behavior:  no      Imaging/Studies: New: no     XRAY:    MRI:  MRI LUMBAR SPINE W WO CONTRAST       CT:  CT LUNG SCREENING     Narrative  [facility]  [phone]    CT Scan Report  Signed    Patient: Julio Scherer                                                                MR#: Kacy Crandall  [id number]  : 1965                                                                [de-identified]    Age/Sex: 60 / M                                                                Admit Date: 23    Loc: [facility]  Attending Dr: Trev Godwin MD    Ordering Physician: Trev Godwin MD  Date of Service: 23  Procedure(s): CT low dose lung screening  Accession Number(s): [id number]    cc: Andrew Rico MD          INDICATION: Lung Cancer Screening.

## 2023-10-13 NOTE — PROGRESS NOTES
ENDARTERECTOMY  10/2018    COLONOSCOPY      COLONOSCOPY N/A 12/30/2021    COLONOSCOPY WITH BIOPSY performed by Edgar English MD at [address] [facility] ARTHROSCOPY Left     UPPER GASTROINTESTINAL ENDOSCOPY      UPPER GASTROINTESTINAL ENDOSCOPY N/A 12/30/2021    EGD BIOPSY performed by Edgar English MD at [facility] ENDOSCOPY       Allergies:    Patient has no known allergies. Social History:   Social History     Socioeconomic History    Marital status:      Spouse name: Not on file    Number of children: Not on file    Years of education: Not on file    Highest education level: Not on file   Occupational History    Not on file   Tobacco Use    Smoking status: Former     Packs/day: 1.50     Years: 37.00     Additional pack years: 0.00     Total pack years: 55.50     Types: Cigars, Cigarettes     Quit date: 2/20/2020     Years since quitting: 3.6    Smokeless tobacco: Never   Vaping Use    Vaping Use: Never used   Substance and Sexual Activity    Alcohol use: Yes     Alcohol/week: 3.0 standard drinks of alcohol     Types: 3 Glasses of wine per week    Drug use: No    Sexual activity: Not on file   Other Topics Concern    Not on file   Social History Narrative    Not on file     Social Determinants of Health     Financial Resource Strain: Low Risk  (4/19/2023)    Overall Financial Resource Strain (CARDIA)     Difficulty of Paying Living Expenses: Not hard at all   Food Insecurity: No Food Insecurity (4/19/2023)    Hunger Vital Sign     Worried About Running Out of Food in the Last Year: Never true     [address] in the Last Year: Never true   Transportation Needs: Unknown (4/19/2023)    PRAPARE - Transportation     Lack of Transportation (Medical): Not on file     Lack of Transportation (Non-Medical):  No   Physical Activity: Not on file   Stress: Not on file   Social Connections: Not on file   Intimate Partner Violence: Not on file   Housing Stability: Unknown (4/19/2023)    Housing

## 2023-10-15 ASSESSMENT — ENCOUNTER SYMPTOMS
NAUSEA: 0
ABDOMINAL PAIN: 0
RHINORRHEA: 0
COUGH: 0
VOMITING: 0
SHORTNESS OF BREATH: 0

## 2023-10-22 NOTE — PROGRESS NOTES
[id number] [facility]     21  Placido Person : 1965 Sex: male  Age: 61 y.o. Chief Complaint   Patient presents with    Hyperlipidemia       HPI  Patient presents today for 4-month follow-up visit on his medical problems. In general seems to be doing okay. His lipids have been stable on a statin medication. Does complain of significant anxiety mostly work-related. He states that a friend of his gave him a couple clonazepam to try and he states it helped him significantly. We have tried SSRIs in the past without success. I did warn him of this side effects of benzodiazepines including the addictive potential.  He would like to trial it for a month and see how he does. I was agreeable and ran OARRS report which was unremarkable. Controlled substance contract was initiated  He is not currently on blood pressure medication. He is checking pressures he states they have been in a good range. Today was a little bit marginal but okay. I did ask him to continue checking. He continues to be smoke-free for over a year now. he did see Dr. Debbie Jama of orthopedics for his elbow and wrist pain. He feels he does have a tendinitis and carpal tunnel respectively he did receive a carpal tunnel injection also an elbow injection. Patient complains of no further chest discomfort at all. He continues to follow with vascular Dr. Desiree John for his carotid vascular disease. No longer follows with urology for history of microscopic hematuria      Review of Systems   Constitutional: Negative for activity change, appetite change, chills, diaphoresis, fatigue, fever and unexpected weight change. HENT: Negative for ear pain, hearing loss  Respiratory: Negative for cough shortness of breath and wheezing.     Cardiovascular: Negative for, chest pain, palpitations and leg swelling. Gastrointestinal: Negative for abdominal pain, blood in stool, constipation, diarrhea, nausea and vomiting.        GERD Anesthesia Pre Eval Note    Anesthesia ROS/Med Hx    Overall Review:  EKG was reviewed and Echo was reviewed     Anesthetic Complication History:  Patient does not have a history of anesthetic complications    No history of malignant hyperthermia  No PONV    Pulmonary Review:    Positive for COPD   Positive for asthma: uses a nebulizer treatment at least twice a day every day.  The patient is a former smoker with a 75 pack-year history.  (Cigarettes from 1/1/1971 to 1/1/2021. 1.5 packs/day for 50.0 years.)    Neuro/Psych Review:    Positive for neuromuscular disease : Parkinson's disease.  Positive for CVA - no residual deficits (found out she had a very small stroke on CT scan, pt never knew she had a stroke. No residual effect. )  Positive for headaches (Chronic tension-type headache. Migraine.)  Positive for psychiatric history - Depression and Anxiety    Cardiovascular Review:  Comments: + Orthostatic hypotension    10/17/2023  Final Impressions    * Normal LV size, mildly increased wall thickness, normal systolic function, EF 67%.    * LV Global longitudinal strain -16.3 %.    * Grade I diastolic dysfunction of the left ventricle (impaired relaxation pattern).    * Normal right ventricular size and systolic function.    * Normal left atrial chamber size.    * Normal right atrial chamber size.    * Mild mitral valve regurgitation.    * Mild tricuspid valve regurgitation.    * Right ventricular systolic pressure; 32 mmHg.    * No pericardial effusion.    * Epicardial adipose tissue.     Exercise tolerance: poor (<4 METS)  - Comment: On a wheel chair. Sometimes uses a walker   Positive for CAD (8/31/2023 CT scan \"Severe coronary calcification\")  Positive for valvular problems/murmurs (mild MR/TR.)  Positive for dysrhythmias (PVCs. Prolonged QT (QTc =482 ms).)  Positive for hypertension (BP high today. ) - poorly controlled  Positive for hyperlipidemia    GI/HEPATIC/RENAL Review:  Comments: + Negro's esophagus  improved   Endocrine: Negative.    Genitourinary: Negative for difficulty urinating, dysuria,, frequency hematuria and urgency.    Musculoskeletal: Positive for arthralgias and  right arm pain . Follows with orthopedics. Negative for back pain, gait problem and myalgias. Skin: Negative.    Allergic/Immunologic: Negative for environmental allergies and immunocompromised state. Neurological:Negative for dizziness, headache weakness, light-headedness and numbness.        Dizziness and headache resolved   Hematological: Negative.    Psychiatric/Behavioral: Positive for sleep disturbance. The patient is nervous/anxious. We have tried SSRIs and he has failed. REST OF PERTINENT ROS GONE OVER AND WAS NEGATIVE. PMH:  Shot Record:  -VHUNPJI Per 10 MG-NDC#[id number] Injection 02/04/08  -Zqum Medrol up to 40mg Injection 06/11/15 12/05/14  -Lincomycin Hci To 300 MG (Lincocin) Injection 02/04/08  -Geys Medrol 40 MG Injection 10/28/17 06/22/09. Health Maintenance:  Colonoscopy Screening - (12/22/2016)  diverticulosis  EKG - (3/2007) 3/07,5/19,12/12, 10/14, 6/18, 9/18  Stress Test - (6/2009) 2009-negative,1/13-neg  EGD - (2016) GERD  esophageal erosions  Influenza Vaccination - (10/18/2018) Patient was Counseled, but refused.   Heart catheterization - 2006-mild CAD  Colonoscopy - 12/16-due 26  Carotid Artery Study - 9/18, 4/20  Medical Problems:  Hypertension - renal artery stenosis on the right-stent  Chronic Obstructive Pulmonary Disease (COPD), Gastroesophageal Reflux Disease (GERD)  esophageal erosions - EGD 2016  Hyperlipidemia, Vitamin B12 and folate deficiency, Attention Deficit Hyperactivity Disorder  CAD - Mild per 2006 heart cath  HSV, Fatty Liver, Left fibula fracture, Prostatitis, Erectile Dysfunction, carotid vascular disease-sees vascular  Surgical Hx:  Left Knee Arthroscopy - Dr Micky Dean  Cardiac Cath with a stent in renal artery - (2006)  carotid endarterectomy-right - without dysplasia      Positive for GERD - poorly controlled    End/Other Review:  Comments: + Frequent falls  + Restless leg syndrome  + DNR suspended for the procedure.       Relevant Problems   No relevant active problems       Physical Exam     Airway   Mallampati: II  TM Distance: >3 FB  Neck ROM: Full  Neck: Non-tender and Able to place in sniff position  TMJ Mobility: Good    Cardiovascular    Cardio Rhythm: Regular  Cardio Rate: Normal    Head Assessment  Head assessment: Normocephalic and Atraumatic    General Assessment  General Assessment: Alert and oriented and No acute distress    Dental Exam  Dental exam normal    Legend: C=Chipped  M=Missing  L=Loose B=Bridge Cr=Hernandez I=Implant    Pulmonary Exam    Patient Demonstrates:  Decreased Breath Sounds and Non-labored Breathing      Anesthesia Plan:    ASA Status: 3  Anesthesia Type: MAC    Induction: Intravenous  Preferred Airway Type: Mask  Maintenance: TIVA    Post-op Pain Management: Per Surgeon      Checklist  Reviewed: EKG, Patient Summary, Care Everywhere, DNR Status, NPO Status, Medications, Allergies, Problem list, Past Med History and Anesthesia Record  Consent/Risks Discussed Statement:  The proposed anesthetic plan, including its risks and benefits, have been discussed with the Patient and Daughter (2 daughters. ) along with the risks and benefits of alternatives. Questions were encouraged and answered and the patient and/or representative understands and agrees to proceed.    I have discussed elements of the patient's history or examination, as noted above and/or as follows, that place the patient at higher risk of complications; heart disease, neurological disease and vascular disease.    I discussed with the patient (and/or patient's legal representative) the risks and benefits of the proposed anesthesia plan, MAC, which may include services performed by other anesthesia providers.    Alternative anesthesia plans, if available, were reviewed  (10/2018)  Reviewed, no changes. FH:  Father:  Alcoholism -  age 79. Mother:  Cerebrovascular Accident (CVA), Non Insulin Dependent Diabetes,  age 76. Reviewed, no changes. SH: Patient is . -[employer] work  Personal Habits: Current cigarette smoker, has smoked 1 pack daily; quit 2015, restarted shortly  after this.  Quit again .  Drinks 2 glasses of wine a day or more, advised to quit  when GERD got worse in 10/2018             Current Outpatient Medications:     Coenzyme Q10 (CO Q-10) 100 MG CAPS, Take 1 capsule by mouth daily, Disp: 90 capsule, Rfl: 1    simvastatin (ZOCOR) 20 MG tablet, Take 1 tablet by mouth nightly, Disp: 90 tablet, Rfl: 1    clonazePAM (KLONOPIN) 1 MG tablet, Take 1 tablet by mouth daily as needed for Anxiety for up to 30 days. , Disp: 30 tablet, Rfl: 1  No Known Allergies    Past Medical History:   Diagnosis Date    ADHD (attention deficit hyperactivity disorder)     Bilateral carotid artery stenosis 2019    CAD (coronary artery disease)     Carotid artery stenosis     COPD (chronic obstructive pulmonary disease) (HCC)     Erectile dysfunction     Esophageal erosions     Essential hypertension 2019    Folate deficiency     Gastroesophageal reflux disease without esophagitis 2019    HSV infection     Hyperlipidemia     Hypertension     Liver disease     fatty liver    Other hyperlipidemia 2019    Prostatitis     BRYCE (renal artery stenosis) (HCC)     right-stent    Simple chronic bronchitis ([address] 2019    Vitamin B 12 deficiency      Past Surgical History:   Procedure Laterality Date    BACK SURGERY      ruptured disc    CARDIAC CATHETERIZATION      CAROTID ENDARTERECTOMY  10/2018    KNEE ARTHROPLASTY      KNEE ARTHROSCOPY Left      Family History   Problem Relation Age of Onset    Diabetes Mother     Stroke Mother     Alcohol Abuse Father      Social History     Socioeconomic History    with the patient (and/or patient's legal representative). Discussion has been held with the patient (and/or patient's legal representative) regarding risks of anesthesia, which include aspiration, conversion to general anesthesia, death, emergence delirium, depressed breathing, dental injury, eye injury, hypotension, infection, nausea, oral injury, post-op intubation, vomiting, sore throat and intra-operative awareness and emergent situations that may require change in anesthesia plan.    The patient (and/or patient's legal representative) has indicated understanding, his/her questions have been answered, and he/she wishes to proceed with the planned anesthetic.    Blood Products: Not Anticipated    Comments  Plan Comments: GETA as a backup.   DNR suspended for the procedure.      Marital status:      Spouse name: Not on file    Number of children: Not on file    Years of education: Not on file    Highest education level: Not on file   Occupational History    Not on file   Tobacco Use    Smoking status: Former Smoker     Packs/day: 1.50     Years: 37.00     Pack years: 55.50     Types: Cigars, Cigarettes     Quit date: 2020     Years since quittin.2    Smokeless tobacco: Never Used   Substance and Sexual Activity    Alcohol use: Yes     Alcohol/week: 3.0 standard drinks     Types: 3 Glasses of wine per week    Drug use: No    Sexual activity: Not on file   Other Topics Concern    Not on file   Social History Narrative    Not on file     Social Determinants of Health     Financial Resource Strain: Low Risk     Difficulty of Paying Living Expenses: Not hard at all   Food Insecurity: No Food Insecurity    Worried About Running Out of Food in the Last Year: Never true    [address] in the Last Year: Never true   Transportation Needs:     Lack of Transportation (Medical):  Lack of Transportation (Non-Medical):    Physical Activity:     Days of Exercise per Week:     Minutes of Exercise per Session:    Stress:     Feeling of Stress :    Social Connections:     Frequency of Communication with Friends and Family:     Frequency of Social Gatherings with Friends and Family:     Attends [employer] witness Services:     Active Member of Clubs or Organizations:     Attends Club or Organization Meetings:     Marital Status:    Intimate Partner Violence:     Fear of Current or Ex-Partner:     Emotionally Abused:     Physically Abused:     Sexually Abused:        Vitals:    21 0958   BP: 136/82   Pulse: 76   Temp: 97.5 °F (36.4 °C)   TempSrc: Temporal   SpO2: 96%   Weight: 207 lb 12.8 oz (94.3 kg)   Height: 6' 1\" (1.854 m)       Physical Exam    Const: Appears well developed and well nourished.  No signs of acute distress present.     Neck: Supple and symmetric. Palpation reveals no adenopathy. No masses appreciated. Thyroid  exhibits no nodule or thyromegaly. No JVD. Carotids: 2+ and equal bilaterally, without bruits/right  carotid endarterectomy scar noted  Resp: No rales, rhonchi or wheezes appreciated over the lungs bilaterally/slightly prolonged expiratory  phase. CV: Rate is regular. Rhythm is regular. S1 is normal. S2 is normal. No gallop or rubs. No heart.   murmur appreciated.    extremities: No clubbing, cyanosis or edema. Abdomen: Bowel sounds are normoactive. Palpation reveals softness, with no distension,  organomegaly or tenderness. No abdominal masses palpable. No palpable hepatosplenomegaly. Musculo: Walks with a normal gait. Upper Extremities: Full ROM bilaterally. Reproducible pain to his right elbow consistent with an epicondylitis    lower Extremities:  Full ROM bilaterally.  Good pulses. Skin: Dry and warm with no rash. Neuro: Alert and oriented x3. Mood is normal. Affect is normal. Speech is articulate and fluent. Upper Extremities: motor strength is intact. Normal muscle tone bilaterally. Lower Extremities: motor  strength is intact. Normal muscle tone bilaterally. Sensation intact to light touch. Reflexes: DTR's are  symmetric, intact and 2+ bilaterally. Cranial Nerves: Cranial nerves II-XII intact. Psych: Patient's attitude is cooperative. Patient's affect is anxious . judgement is realistic. Insight is  appropriate.           Assessment and Plan:  Surekha Lopez was seen today for hyperlipidemia. Diagnoses and all orders for this visit:    Other hyperlipidemia  -     Lipid Panel; Future  Stable on statin medication    Essential hypertension  -     Comprehensive Metabolic Panel; Future  -     CBC Auto Differential; Future  Stable currently on no medication    Anxiety  -     clonazePAM (KLONOPIN) 1 MG tablet; Take 1 tablet by mouth daily as needed for Anxiety for up to 30 days.   Initiating as needed clonazepam    History of regular

## 2023-11-27 ENCOUNTER — OFFICE VISIT (OUTPATIENT)
Dept: PAIN MANAGEMENT | Age: 58
End: 2023-11-27
Payer: COMMERCIAL

## 2023-11-27 VITALS
DIASTOLIC BLOOD PRESSURE: 64 MMHG | RESPIRATION RATE: 16 BRPM | SYSTOLIC BLOOD PRESSURE: 135 MMHG | HEART RATE: 75 BPM | BODY MASS INDEX: 28.49 KG/M2 | HEIGHT: 73 IN | WEIGHT: 215 LBS | TEMPERATURE: 98.2 F | OXYGEN SATURATION: 94 %

## 2023-11-27 DIAGNOSIS — M96.1 POST LAMINECTOMY SYNDROME: ICD-10-CM

## 2023-11-27 DIAGNOSIS — M54.41 CHRONIC MIDLINE LOW BACK PAIN WITH RIGHT-SIDED SCIATICA: ICD-10-CM

## 2023-11-27 DIAGNOSIS — M48.062 SPINAL STENOSIS OF LUMBAR REGION WITH NEUROGENIC CLAUDICATION: Primary | ICD-10-CM

## 2023-11-27 DIAGNOSIS — M54.16 LUMBAR RADICULOPATHY: ICD-10-CM

## 2023-11-27 DIAGNOSIS — G89.29 CHRONIC MIDLINE LOW BACK PAIN WITH RIGHT-SIDED SCIATICA: ICD-10-CM

## 2023-11-27 PROCEDURE — 99214 OFFICE O/P EST MOD 30 MIN: CPT | Performed by: STUDENT IN AN ORGANIZED HEALTH CARE EDUCATION/TRAINING PROGRAM

## 2023-11-27 PROCEDURE — 3078F DIAST BP <80 MM HG: CPT | Performed by: STUDENT IN AN ORGANIZED HEALTH CARE EDUCATION/TRAINING PROGRAM

## 2023-11-27 PROCEDURE — 3075F SYST BP GE 130 - 139MM HG: CPT | Performed by: STUDENT IN AN ORGANIZED HEALTH CARE EDUCATION/TRAINING PROGRAM

## 2023-11-27 RX ORDER — GABAPENTIN 300 MG/1
300 CAPSULE ORAL 2 TIMES DAILY
Qty: 60 CAPSULE | Refills: 1 | Status: SHIPPED | OUTPATIENT
Start: 2023-11-27 | End: 2024-01-26

## 2024-01-12 ENCOUNTER — OFFICE VISIT (OUTPATIENT)
Dept: PAIN MANAGEMENT | Age: 59
End: 2024-01-12
Payer: COMMERCIAL

## 2024-01-12 VITALS
BODY MASS INDEX: 28.49 KG/M2 | HEIGHT: 73 IN | RESPIRATION RATE: 16 BRPM | DIASTOLIC BLOOD PRESSURE: 77 MMHG | WEIGHT: 215 LBS | SYSTOLIC BLOOD PRESSURE: 144 MMHG | OXYGEN SATURATION: 97 % | HEART RATE: 88 BPM | TEMPERATURE: 97.2 F

## 2024-01-12 DIAGNOSIS — M96.1 POST LAMINECTOMY SYNDROME: ICD-10-CM

## 2024-01-12 DIAGNOSIS — M54.41 CHRONIC MIDLINE LOW BACK PAIN WITH RIGHT-SIDED SCIATICA: ICD-10-CM

## 2024-01-12 DIAGNOSIS — M54.16 LUMBAR RADICULOPATHY: ICD-10-CM

## 2024-01-12 DIAGNOSIS — M48.062 SPINAL STENOSIS OF LUMBAR REGION WITH NEUROGENIC CLAUDICATION: Primary | ICD-10-CM

## 2024-01-12 DIAGNOSIS — G89.29 CHRONIC MIDLINE LOW BACK PAIN WITH RIGHT-SIDED SCIATICA: ICD-10-CM

## 2024-01-12 PROCEDURE — 99214 OFFICE O/P EST MOD 30 MIN: CPT | Performed by: STUDENT IN AN ORGANIZED HEALTH CARE EDUCATION/TRAINING PROGRAM

## 2024-01-12 PROCEDURE — 3077F SYST BP >= 140 MM HG: CPT | Performed by: STUDENT IN AN ORGANIZED HEALTH CARE EDUCATION/TRAINING PROGRAM

## 2024-01-12 PROCEDURE — 3078F DIAST BP <80 MM HG: CPT | Performed by: STUDENT IN AN ORGANIZED HEALTH CARE EDUCATION/TRAINING PROGRAM

## 2024-01-12 RX ORDER — METHYLPREDNISOLONE 4 MG/1
TABLET ORAL
Qty: 1 KIT | Refills: 0 | Status: SHIPPED | OUTPATIENT
Start: 2024-01-12 | End: 2024-01-18

## 2024-01-12 NOTE — PROGRESS NOTES
[facility] - Pain Medicine  [address] [place] [address]    Pain Medicine Follow Up Note      Mickey Cohen     Date of Visit:  2024    CC:  Patient presents for follow up   Chief Complaint   Patient presents with    Follow-up     Lower back        HPI:    Pain is unchanged.  Medication side effects:none.   Recent interventional procedures:none.  Blood Thinners/Anticoagulation:  yes - ASA 81  Herbal Supplements: Fish oil, CoQ10  Pertinent Allergies: no  Diabetic: no  Bowel/Bladder Incontinence: no    Previous Plan:  PT - ongoing - having some soreness   Gabapentin 300mg -stop taking to see how he would feel, did not feel a significant change   F/[facility] - Surgery 11/3/23  Revision R L4, L5, S1 Laminectomies  Revision partial L4/5, L5/S1 Facetectomies and foraminotomies  Seen by NP last week, recommended steroid pack but did not prescribe    Interval Changes:  Still having some residual symptoms in the RLE with certain movements including N/T    Procedures:        Previous Treatments:   ibuprofen, tylenol, biofreeze, surgeries, epidural injections  .    Potential Aberrant Drug-Related Behavior:  no      Imaging/Studies: New: no     XRAY:    MRI:  MRI LUMBAR SPINE W WO CONTRAST       CT:  CT LUNG SCREENING     [facility] [address] [phone]    CT Scan Report  Signed    Patient: MICKEY COHEN                                                                MR#: [id number]  [id number]  : 1965                                                                Acct:[id number]    Age/Sex: 58 / M                                                                Admit Date: 23    Loc: [facility]  Attending Dr: Andrew Rico MD    Ordering Physician: Andrew Rico MD  Date of Service: 23  Procedure(s): CT low dose lung screening  Accession Number(s): [id number]    cc:

## 2024-01-12 NOTE — PROGRESS NOTES
Dale Cohen presents to the [facility] on 1/12/2024. Dale is complaining of pain low back . The pain is constant. The pain is described as aching. Pain is rated on his best day at a 6, on his worst day at a 10, and on average at a 8 on the VAS scale. He took his last dose of Tylenol last night .    Any procedures since your last visit: No    He is not on NSAIDS and  is  on anticoagulation medications to include ASA and is managed by Andrew Rico MD  .     Pacemaker or defibrillator: No      Medication Contract and Consent for Opioid Use Documents Filed        No documents found                       BP (!) 144/77   Pulse 88   Temp 97.2 °F (36.2 °C) (Temporal)   Resp 16   Ht 1.854 m (6' 1\")   Wt 97.5 kg (215 lb)   SpO2 97%   BMI 28.37 kg/m²      No LMP for male patient.

## 2024-02-26 ENCOUNTER — OFFICE VISIT (OUTPATIENT)
Dept: FAMILY MEDICINE CLINIC | Age: 59
End: 2024-02-26
Payer: COMMERCIAL

## 2024-02-26 VITALS
HEART RATE: 74 BPM | TEMPERATURE: 97.6 F | HEIGHT: 73 IN | OXYGEN SATURATION: 96 % | DIASTOLIC BLOOD PRESSURE: 74 MMHG | BODY MASS INDEX: 29.29 KG/M2 | SYSTOLIC BLOOD PRESSURE: 132 MMHG | WEIGHT: 221 LBS

## 2024-02-26 DIAGNOSIS — R52 GENERALIZED BODY ACHES: ICD-10-CM

## 2024-02-26 DIAGNOSIS — J01.90 ACUTE BACTERIAL SINUSITIS: Primary | ICD-10-CM

## 2024-02-26 DIAGNOSIS — B96.89 ACUTE BACTERIAL SINUSITIS: Primary | ICD-10-CM

## 2024-02-26 DIAGNOSIS — R07.9 CHEST PAIN, UNSPECIFIED TYPE: ICD-10-CM

## 2024-02-26 PROCEDURE — 3078F DIAST BP <80 MM HG: CPT

## 2024-02-26 PROCEDURE — 99213 OFFICE O/P EST LOW 20 MIN: CPT

## 2024-02-26 PROCEDURE — 93000 ELECTROCARDIOGRAM COMPLETE: CPT

## 2024-02-26 PROCEDURE — 3075F SYST BP GE 130 - 139MM HG: CPT

## 2024-02-26 RX ORDER — AMOXICILLIN AND CLAVULANATE POTASSIUM 875; 125 MG/1; MG/1
1 TABLET, FILM COATED ORAL 2 TIMES DAILY
Qty: 20 TABLET | Refills: 0 | Status: SHIPPED | OUTPATIENT
Start: 2024-02-26 | End: 2024-03-07

## 2024-02-26 NOTE — PROGRESS NOTES
Upper gastrointestinal endoscopy; Colonoscopy (N/A, 12/30/2021); and Upper gastrointestinal endoscopy (N/A, 12/30/2021).  Social History:  reports that he quit smoking about 4 years ago. His smoking use included cigars and cigarettes. He started smoking about 41 years ago. He has a 55.5 pack-year smoking history. He has never used smokeless tobacco. He reports current alcohol use of about 3.0 standard drinks of alcohol per week. He reports that he does not use drugs.  Family History: family history includes Alcohol Abuse in his father; Diabetes in his mother; Stroke in his mother.   Allergies: Patient has no known allergies.    Physical Exam      VS:  /74   Pulse 74   Temp 97.6 °F (36.4 °C)   Ht 1.854 m (6' 1\")   Wt 100.2 kg (221 lb)   SpO2 96%   BMI 29.16 kg/m²    Oxygen Saturation Interpretation: Normal.    Constitutional:  Alert, development consistent with age. NAD.  Head:  NC/NT.   Ears: TMs translucent bilaterally. Canals without exudate or swelling bilaterally.  Nose: Congestion of the nasal mucosa with clear drainage. Mild TTP of the frontal sinuses bilaterally.   Mouth: Posterior pharynx with mild erythema and clear postnasal drip. No tonsillar hypertrophy or exudate. Airway patent.   Neck:  Normal ROM. Supple. No anterior cervical adenopathy noted.  Lungs: CTAB without wheezes, rales, or rhonchi. No TTP of the anterior chest wall.   CV:  Regular rate and rhythm, normal heart sounds, without pathological murmurs, ectopy, gallops, or rubs.  Skin:  Normal turgor.  Warm, dry, without visible rash.  Lymphatic: No lymphangitis or adenopathy noted.  Neurological:  Oriented.  Motor functions intact.    Lab / Imaging Results   (All laboratory and radiology results have been personally reviewed by myself)  Labs:  No results found for this visit on 02/26/24.    EKG #1:  Interpreted by me and Dr. Giovanni MD.  Time:  9:43 am    Rate: 73 bpm  Rhythm: Sinus rhythm   Interpretation: Sinus rhythm within normal

## 2024-02-29 ENCOUNTER — PATIENT MESSAGE (OUTPATIENT)
Dept: FAMILY MEDICINE CLINIC | Age: 59
End: 2024-02-29

## 2024-02-29 NOTE — TELEPHONE ENCOUNTER
From: Dale Cohen  To: Dr. Andrew Rico  Sent: 2/29/2024 9:12 AM EST  Subject: Follow up     When is the soonest I can getin to see Dr Rico   monae all pertinent systems normal

## 2024-03-05 ENCOUNTER — OFFICE VISIT (OUTPATIENT)
Dept: FAMILY MEDICINE CLINIC | Age: 59
End: 2024-03-05
Payer: COMMERCIAL

## 2024-03-05 ENCOUNTER — PATIENT MESSAGE (OUTPATIENT)
Dept: FAMILY MEDICINE CLINIC | Age: 59
End: 2024-03-05

## 2024-03-05 VITALS
OXYGEN SATURATION: 95 % | DIASTOLIC BLOOD PRESSURE: 86 MMHG | WEIGHT: 221 LBS | SYSTOLIC BLOOD PRESSURE: 134 MMHG | HEIGHT: 73 IN | BODY MASS INDEX: 29.29 KG/M2 | HEART RATE: 82 BPM | TEMPERATURE: 98.1 F

## 2024-03-05 DIAGNOSIS — R25.2 MUSCLE CRAMPING: Primary | ICD-10-CM

## 2024-03-05 DIAGNOSIS — M79.10 MYALGIA: ICD-10-CM

## 2024-03-05 DIAGNOSIS — I71.21 ANEURYSM OF ASCENDING AORTA WITHOUT RUPTURE (HCC): ICD-10-CM

## 2024-03-05 DIAGNOSIS — R10.13 EPIGASTRIC PAIN: ICD-10-CM

## 2024-03-05 DIAGNOSIS — F41.9 ANXIETY: ICD-10-CM

## 2024-03-05 PROBLEM — J41.0 SIMPLE CHRONIC BRONCHITIS (HCC): Status: RESOLVED | Noted: 2019-05-29 | Resolved: 2024-03-05

## 2024-03-05 PROCEDURE — 3075F SYST BP GE 130 - 139MM HG: CPT | Performed by: STUDENT IN AN ORGANIZED HEALTH CARE EDUCATION/TRAINING PROGRAM

## 2024-03-05 PROCEDURE — 99214 OFFICE O/P EST MOD 30 MIN: CPT | Performed by: STUDENT IN AN ORGANIZED HEALTH CARE EDUCATION/TRAINING PROGRAM

## 2024-03-05 PROCEDURE — 3079F DIAST BP 80-89 MM HG: CPT | Performed by: STUDENT IN AN ORGANIZED HEALTH CARE EDUCATION/TRAINING PROGRAM

## 2024-03-05 ASSESSMENT — PATIENT HEALTH QUESTIONNAIRE - PHQ9
2. FEELING DOWN, DEPRESSED OR HOPELESS: 0
1. LITTLE INTEREST OR PLEASURE IN DOING THINGS: 0
SUM OF ALL RESPONSES TO PHQ QUESTIONS 1-9: 0
SUM OF ALL RESPONSES TO PHQ9 QUESTIONS 1 & 2: 0
SUM OF ALL RESPONSES TO PHQ QUESTIONS 1-9: 0

## 2024-03-05 NOTE — TELEPHONE ENCOUNTER
From: Dale Cohen  To: Dr. Andrew Rico  Sent: 3/5/2024 2:37 PM EST  Subject: Lab work    Would you please let the doctor know I could not stay for lab work but I will be in first thing tomorrow March 6th     Thanks

## 2024-03-05 NOTE — PROGRESS NOTES
bilat symmetrical expansion, no wheeze, rales, or rhonchi  Abdomen:  Bowel sounds present, soft, epigastric tenderness, no masses, no organomegaly, no peritoneal signs  Extremities:  No clubbing, cyanosis, or edema  Neuro:  Alert and oriented x3, no focal deficits      Assessment and Plan     Start treatment for anxiety and monitor improvement. Discussed return and ER precautions. Discussed benefits, risks and expected course of therapy as well as other options.   Encouraged to take protonix daily  Stop statin for 2 weeks, if legs don't improve OK to restart  Follow up in 1 month-if not getting improvement may need a scope  Check labs  Needs to repeat CT in June of 24 for AAA-stable without symptoms at this time    1. Muscle cramping    - CK; Future  - Comprehensive Metabolic Panel; Future  - Magnesium; Future  - CBC with Auto Differential; Future    2. Aneurysm of ascending aorta without rupture (HCC)    - CBC with Auto Differential; Future    3. Myalgia    - CK; Future  - Magnesium; Future  - CBC with Auto Differential; Future    4. Anxiety    - sertraline (ZOLOFT) 50 MG tablet; Take 1 tablet by mouth daily  Dispense: 30 tablet; Refill: 1    5. Epigastric pain    - Comprehensive Metabolic Panel; Future  - CBC with Auto Differential; Future  - Lipase; Future      Counseled regarding above diagnosis, including possible risks and complications,  especially if left uncontrolled. Counseled regarding the possible side effects, risks, benefits and alternatives to treatment;patient and/or guardian verbalizes understanding, agrees, feels comfortable with and wishes to proceed with above treatment plan.    Call or go to ED immediately if symptoms worsen or persist. Advised patient to call with any new medication issues, and, as applicable, read all Rx info from pharmacy to assure aware of all possible risks and side effects of medicationbefore taking.     Patient and/or guardian given opportunity to ask questions/raise

## 2024-03-06 DIAGNOSIS — I71.21 ANEURYSM OF ASCENDING AORTA WITHOUT RUPTURE (HCC): ICD-10-CM

## 2024-03-06 DIAGNOSIS — R10.13 EPIGASTRIC PAIN: ICD-10-CM

## 2024-03-06 DIAGNOSIS — R25.2 MUSCLE CRAMPING: ICD-10-CM

## 2024-03-06 DIAGNOSIS — M79.10 MYALGIA: ICD-10-CM

## 2024-03-07 LAB
ABSOLUTE IMMATURE GRANULOCYTE: <0.03 K/UL (ref 0–0.58)
ALBUMIN SERPL-MCNC: 4.4 G/DL (ref 3.5–5.2)
ALP BLD-CCNC: 79 U/L (ref 40–129)
ALT SERPL-CCNC: 21 U/L (ref 0–40)
ANION GAP SERPL CALCULATED.3IONS-SCNC: 14 MMOL/L (ref 7–16)
AST SERPL-CCNC: 13 U/L (ref 0–39)
BASOPHILS ABSOLUTE: 0.04 K/UL (ref 0–0.2)
BASOPHILS RELATIVE PERCENT: 1 % (ref 0–2)
BILIRUB SERPL-MCNC: 0.2 MG/DL (ref 0–1.2)
BUN BLDV-MCNC: 13 MG/DL (ref 6–23)
CALCIUM SERPL-MCNC: 9.5 MG/DL (ref 8.6–10.2)
CHLORIDE BLD-SCNC: 106 MMOL/L (ref 98–107)
CO2: 19 MMOL/L (ref 22–29)
CREAT SERPL-MCNC: 0.8 MG/DL (ref 0.7–1.2)
EOSINOPHILS ABSOLUTE: 0.1 K/UL (ref 0.05–0.5)
EOSINOPHILS RELATIVE PERCENT: 1 % (ref 0–6)
GFR SERPL CREATININE-BSD FRML MDRD: >60 ML/MIN/1.73M2
GLUCOSE BLD-MCNC: 115 MG/DL (ref 74–99)
HCT VFR BLD CALC: 42.5 % (ref 37–54)
HEMOGLOBIN: 13.9 G/DL (ref 12.5–16.5)
IMMATURE GRANULOCYTES: 0 % (ref 0–5)
LIPASE: 42 U/L (ref 13–60)
LYMPHOCYTES ABSOLUTE: 2.08 K/UL (ref 1.5–4)
LYMPHOCYTES RELATIVE PERCENT: 27 % (ref 20–42)
MAGNESIUM: 2.1 MG/DL (ref 1.6–2.6)
MCH RBC QN AUTO: 29.6 PG (ref 26–35)
MCHC RBC AUTO-ENTMCNC: 32.7 G/DL (ref 32–34.5)
MCV RBC AUTO: 90.4 FL (ref 80–99.9)
MONOCYTES ABSOLUTE: 0.56 K/UL (ref 0.1–0.95)
MONOCYTES RELATIVE PERCENT: 7 % (ref 2–12)
NEUTROPHILS ABSOLUTE: 5 K/UL (ref 1.8–7.3)
NEUTROPHILS RELATIVE PERCENT: 64 % (ref 43–80)
PDW BLD-RTO: 13.1 % (ref 11.5–15)
PLATELET # BLD: 285 K/UL (ref 130–450)
PMV BLD AUTO: 11.6 FL (ref 7–12)
POTASSIUM SERPL-SCNC: 4.2 MMOL/L (ref 3.5–5)
RBC # BLD: 4.7 M/UL (ref 3.8–5.8)
SODIUM BLD-SCNC: 139 MMOL/L (ref 132–146)
TOTAL CK: 84 U/L (ref 20–200)
TOTAL PROTEIN: 7.4 G/DL (ref 6.4–8.3)
WBC # BLD: 7.8 K/UL (ref 4.5–11.5)

## 2024-03-15 ENCOUNTER — OFFICE VISIT (OUTPATIENT)
Dept: SURGERY | Age: 59
End: 2024-03-15
Payer: COMMERCIAL

## 2024-03-15 VITALS
TEMPERATURE: 98.8 F | OXYGEN SATURATION: 98 % | DIASTOLIC BLOOD PRESSURE: 94 MMHG | BODY MASS INDEX: 29.16 KG/M2 | WEIGHT: 220 LBS | SYSTOLIC BLOOD PRESSURE: 170 MMHG | HEART RATE: 67 BPM | HEIGHT: 73 IN

## 2024-03-15 DIAGNOSIS — K21.9 GASTROESOPHAGEAL REFLUX DISEASE, UNSPECIFIED WHETHER ESOPHAGITIS PRESENT: ICD-10-CM

## 2024-03-15 DIAGNOSIS — K29.70 GASTRITIS, PRESENCE OF BLEEDING UNSPECIFIED, UNSPECIFIED CHRONICITY, UNSPECIFIED GASTRITIS TYPE: Primary | ICD-10-CM

## 2024-03-15 DIAGNOSIS — K80.20 SYMPTOMATIC CHOLELITHIASIS: ICD-10-CM

## 2024-03-15 PROCEDURE — 3079F DIAST BP 80-89 MM HG: CPT | Performed by: SURGERY

## 2024-03-15 PROCEDURE — 99214 OFFICE O/P EST MOD 30 MIN: CPT | Performed by: SURGERY

## 2024-03-15 PROCEDURE — 3077F SYST BP >= 140 MM HG: CPT | Performed by: SURGERY

## 2024-03-15 RX ORDER — SUCRALFATE 1 G/1
1 TABLET ORAL 3 TIMES DAILY
Qty: 120 TABLET | Refills: 3 | Status: SHIPPED | OUTPATIENT
Start: 2024-03-15

## 2024-03-15 RX ORDER — PANTOPRAZOLE SODIUM 40 MG/1
40 TABLET, DELAYED RELEASE ORAL 2 TIMES DAILY
Qty: 90 TABLET | Refills: 0 | Status: SHIPPED | OUTPATIENT
Start: 2024-03-15

## 2024-03-15 NOTE — PROGRESS NOTES
04/15/2019 12:00 AM    GFRAA >60 10/27/2021 07:39 AM    LABGLOM >60 03/06/2024 08:16 AM    GLUCOSE 115 03/06/2024 08:16 AM    PROT 7.4 03/06/2024 08:16 AM    LABALBU 4.4 03/06/2024 08:16 AM    CALCIUM 9.5 03/06/2024 08:16 AM    BILITOT 0.2 03/06/2024 08:16 AM    ALKPHOS 79 03/06/2024 08:16 AM    AST 13 03/06/2024 08:16 AM    ALT 21 03/06/2024 08:16 AM     PT/INR:    Lab Results   Component Value Date/Time    PROTIME 10.9 10/13/2023 12:37 PM    INR 1.0 10/13/2023 12:37 PM     HgBA1c:    Lab Results   Component Value Date/Time    LABA1C 5.9 05/12/2023 07:45 AM     LIPASE:    Lab Results   Component Value Date/Time    LIPASE 42 03/06/2024 08:16 AM          Kosta Johnson MD    I have examined the patient and performed the key aspects of physical exam, and reviewed the record (including laboratory findings, results, and all pertinent radiology images, which are independently reviewed and interpreted unless otherwise explicitly stated).  The referring provider's notes were also reviewed.    Any procedures planned or discussed as above had risks, benefits, and reasonable alternatives thoroughly discussed with the patient or responsible party. Standard OTC medication avoidance for procedures also provided.    NOTE: This report, in part or full, may have been transcribed using voice recognition software. Every effort was made to ensure accuracy; however, inadvertent computerized transcription errors may be present. Please excuse any transcriptional grammatical or spelling errors that may have escaped my editorial review.    CC: Andrew Rico MD

## 2024-04-05 ENCOUNTER — OFFICE VISIT (OUTPATIENT)
Dept: FAMILY MEDICINE CLINIC | Age: 59
End: 2024-04-05
Payer: COMMERCIAL

## 2024-04-05 VITALS
HEIGHT: 73 IN | BODY MASS INDEX: 29.45 KG/M2 | SYSTOLIC BLOOD PRESSURE: 130 MMHG | DIASTOLIC BLOOD PRESSURE: 74 MMHG | TEMPERATURE: 97.2 F | OXYGEN SATURATION: 96 % | WEIGHT: 222.2 LBS | HEART RATE: 80 BPM

## 2024-04-05 DIAGNOSIS — F41.9 ANXIETY: ICD-10-CM

## 2024-04-05 DIAGNOSIS — K21.9 GASTROESOPHAGEAL REFLUX DISEASE, UNSPECIFIED WHETHER ESOPHAGITIS PRESENT: ICD-10-CM

## 2024-04-05 DIAGNOSIS — M25.562 ACUTE PAIN OF LEFT KNEE: Primary | ICD-10-CM

## 2024-04-05 PROCEDURE — 99213 OFFICE O/P EST LOW 20 MIN: CPT | Performed by: STUDENT IN AN ORGANIZED HEALTH CARE EDUCATION/TRAINING PROGRAM

## 2024-04-05 PROCEDURE — G2211 COMPLEX E/M VISIT ADD ON: HCPCS | Performed by: STUDENT IN AN ORGANIZED HEALTH CARE EDUCATION/TRAINING PROGRAM

## 2024-04-05 PROCEDURE — 3075F SYST BP GE 130 - 139MM HG: CPT | Performed by: STUDENT IN AN ORGANIZED HEALTH CARE EDUCATION/TRAINING PROGRAM

## 2024-04-05 PROCEDURE — 3078F DIAST BP <80 MM HG: CPT | Performed by: STUDENT IN AN ORGANIZED HEALTH CARE EDUCATION/TRAINING PROGRAM

## 2024-04-05 RX ORDER — TADALAFIL 5 MG/1
5 TABLET ORAL DAILY
COMMUNITY
Start: 2024-03-29

## 2024-04-05 ASSESSMENT — ENCOUNTER SYMPTOMS
RHINORRHEA: 0
ABDOMINAL PAIN: 1
SHORTNESS OF BREATH: 0
COUGH: 0
VOMITING: 0
NAUSEA: 0

## 2024-04-05 NOTE — PROGRESS NOTES
[facility]  Office Progress Note  Dr. Andrew Rico      Patient:  Dale Cohen 59 y.o. male     Date of Service: 4/5/24      Chief complaint:   Chief Complaint   Patient presents with    Gastroesophageal Reflux     1 mo follow up    Anxiety     Zoloft follow up, stopped taking. Didn't like how it made him feel. Doesn't want another med         History of Present Illness   The patient is a 59 y.o. male  here to follow up of their GERD    GERD-improved but not completely better. Still has some heartburn symptoms + superficial tenderness as well. Taking carafate and protonix BID    Has is starting to have small episodes of dry heaving secondary to anxiety. Worse in the AM, happens for about 10 minutes. . About 1 year ago he was treated with one month of klonopin  which was very successful. He is aware it is not a long term solution. OK with holding off right now until seeing surgery again but he will let me know if it gets worse    L kene pain, started suddenly when waking up from nap about a month ago. Hx of arthroscopic surgery. It was swollen, bruised. Has been improving since but still a little tender. Able to walk OK    Decided to stop zoloft, does not want treated for mood at this time.    Past Medical History:      Diagnosis Date    Abdominal pain     for ECG and c-scope 12/30/21    ADHD (attention deficit hyperactivity disorder)     Bilateral carotid artery stenosis     follows with Dr Carson annually    COPD (chronic obstructive pulmonary disease) ([facility])     Epicondylitis, lateral     Erectile dysfunction     Esophageal erosions     Fatty liver     Folate deficiency     Gastroesophageal reflux disease without esophagitis 5/29/2019    HSV infection     Hyperlipidemia     Hypertension     BRYCE (renal artery stenosis) ([facility])     right-stent    Vitamin B 12 deficiency        Review of Systems:   Review of Systems   Constitutional:  Negative for chills and fever.   HENT:  Negative for

## 2024-04-17 ENCOUNTER — OFFICE VISIT (OUTPATIENT)
Dept: SURGERY | Age: 59
End: 2024-04-17
Payer: COMMERCIAL

## 2024-04-17 VITALS
HEIGHT: 73 IN | WEIGHT: 220 LBS | TEMPERATURE: 97.7 F | BODY MASS INDEX: 29.16 KG/M2 | OXYGEN SATURATION: 97 % | SYSTOLIC BLOOD PRESSURE: 147 MMHG | DIASTOLIC BLOOD PRESSURE: 82 MMHG | HEART RATE: 67 BPM

## 2024-04-17 DIAGNOSIS — K27.9 PEPTIC ULCER DISEASE: Primary | ICD-10-CM

## 2024-04-17 DIAGNOSIS — R10.11 RUQ PAIN: ICD-10-CM

## 2024-04-17 DIAGNOSIS — K80.20 SYMPTOMATIC CHOLELITHIASIS: ICD-10-CM

## 2024-04-17 PROCEDURE — 3077F SYST BP >= 140 MM HG: CPT | Performed by: SURGERY

## 2024-04-17 PROCEDURE — 3079F DIAST BP 80-89 MM HG: CPT | Performed by: SURGERY

## 2024-04-17 PROCEDURE — 99214 OFFICE O/P EST MOD 30 MIN: CPT | Performed by: SURGERY

## 2024-04-17 RX ORDER — FAMOTIDINE 40 MG/1
40 TABLET, FILM COATED ORAL 2 TIMES DAILY
Qty: 30 TABLET | Refills: 3 | Status: SHIPPED | OUTPATIENT
Start: 2024-04-17

## 2024-04-17 NOTE — PROGRESS NOTES
[facility] General Surgery Clinic Note    Assessment/Plan:     Diagnosis Orders   1. Peptic ulcer disease  famotidine (PEPCID) 40 MG tablet    Pepcid, Carafate.  Will plan for EGD evaluation.      2. Symptomatic cholelithiasis  US GALLBLADDER RUQ    We will plan follow-up ultrasound as his gallbladder may be becoming symptomatic.      3. RUQ pain      Etiology not completely clear, prognosis not yet determined.  Workup as above.          Return for EGD.      Chief Complaint   Patient presents with    Follow-up     4 week follow up. Stopped taking pantoprazole, not feeling any better       PCP: Andrew Rico MD    HPI: Dale Cohen is a 59 y.o. male here for follow-up of epigastric/right upper quadrant pain.  Says he stopped his PPI after about 2 to 3 weeks because he said it gave him a \"different pain.\"  He says he did stop it and symptoms went away.  He is taking Carafate as prescribed and says that helps.  He is taking it 3 times a day.  He denies any radiation of his pain.  He says the pain is worse when his stomach is empty.  He has no issues moving his bowels.  He has cut out caffeine as I exacerbates his symptom as well as chocolate sauces and spicy foods.  Denies any melena.  There is no nausea or vomiting.  Recent AST, ALT, alk phos bilirubin and lipase are unremarkable.    Review of Systems   All other systems reviewed and are negative.        Past Medical History:   Diagnosis Date    Abdominal pain     for ECG and c-scope 12/30/21    ADHD (attention deficit hyperactivity disorder)     Bilateral carotid artery stenosis     follows with Dr Carson annually    COPD (chronic obstructive pulmonary disease) ([facility])     Epicondylitis, lateral     Erectile dysfunction     Esophageal erosions     Fatty liver     Folate deficiency     Gastroesophageal reflux disease without esophagitis 5/29/2019    HSV infection     Hyperlipidemia     Hypertension     BRYCE (renal artery stenosis) ([facility])     right-stent    Vitamin

## 2024-04-19 ENCOUNTER — TELEPHONE (OUTPATIENT)
Dept: SURGERY | Age: 59
End: 2024-04-19

## 2024-04-19 ENCOUNTER — PREP FOR PROCEDURE (OUTPATIENT)
Dept: SURGERY | Age: 59
End: 2024-04-19

## 2024-04-19 DIAGNOSIS — K27.9 PUD (PEPTIC ULCER DISEASE): ICD-10-CM

## 2024-04-19 NOTE — TELEPHONE ENCOUNTER
Dale Cohen is scheduled for EGD with Dr Johnson on 04-29-24 at [facility]. Patient needs to be NPO after midnight the night before procedure. All surgery instructions were explained to the patient and a surgery letter was also mailed out. MA informed patient that PAT will also be calling to review pre-op instructions and medications. Patient verbalized understanding.  Electronically signed by Seble Woods MA on 4/19/2024 at 9:53 AM

## 2024-04-25 NOTE — PROGRESS NOTES
[facility] PRE-ADMISSION TESTING INSTRUCTIONS    The Preadmission Testing patient is instructed accordingly using the following criteria (check applicable):    ARRIVAL INSTRUCTIONS:  [x] Parking the day of Surgery is located in the Main Entrance lot.  Upon entering the door, make an immediate right to the surgery reception desk    [x] Bring photo ID and insurance card    [] Bring in a copy of Living will or Durable Power of  papers.    [x] Please be sure to arrange for a responsible adult to provide transportation to and from the hospital    [x] Please arrange for a responsible adult to be with you for the 24 hour period post procedure due to having anesthesia    [x] If you awake am of surgery not feeling well or have temperature >100 please call [phone]    GENERAL INSTRUCTIONS:    [x] No solid foods after midnight, may have up to 8oz of water until 4 hours prior to surgery. No gum, no candy, no mints. NPO time: 0945       [x] You may brush your teeth, do not swallow any toothpaste    [x] Take medications as instructed     [x] Stop herbal supplements and vitamins 5 days prior to procedure    [x] Follow preop dosing of blood thinners per physician instructions    [] Take 1/2 dose of evening insulin, but no insulin after midnight    [] No oral diabetic medications after midnight    [] If diabetic and have low blood sugar or feel symptomatic, take 1-2oz apple juice only    [] Bring inhalers day of surgery    [] Bring urine specimen day of surgery    [x] Shower or bath with soap, lather and rinse well, AM of Surgery, no lotion, powders or creams to surgical site    [] Follow bowel prep as instructed per surgeon    [x] No tobacco products within 24 hours of surgery     [x] No alcohol or illegal drug use, marijuana included, within 24 hours of surgery.    [x] Jewelry, body piercing's, eyeglasses, contact lenses and dentures are not permitted into surgery (bring cases)      [x]

## 2024-04-27 ENCOUNTER — ANESTHESIA EVENT (OUTPATIENT)
Dept: ENDOSCOPY | Age: 59
End: 2024-04-27
Payer: COMMERCIAL

## 2024-04-27 NOTE — ANESTHESIA PRE PROCEDURE
Department of Anesthesiology  Preprocedure Note       Name:  Dale Cohen   Age:  59 y.o.  :  1965                                          MRN:  [id number]         Date:  2024      Surgeon: Surgeon(s):  Kosta Johnson MD    Procedure: Procedure(s):  ESOPHAGOGASTRODUODENOSCOPY    Medications prior to admission:   Prior to Admission medications    Medication Sig Start Date End Date Taking? Authorizing Provider   famotidine (PEPCID) 40 MG tablet Take 1 tablet by mouth 2 times daily 24   Kosta Johnson MD   tadalafil (CIALIS) 5 MG tablet Take 1 tablet by mouth daily 3/29/24   ProviderYaa MD   sucralfate (CARAFATE) 1 GM tablet Take 1 tablet by mouth in the morning, at noon, and at bedtime 3/15/24   Kosta Johnson MD   sertraline (ZOLOFT) 50 MG tablet Take 1 tablet by mouth daily  Patient not taking: Reported on 2024 3/5/24   PredebonAndrew MD   aspirin 81 MG EC tablet Take 1 tablet by mouth daily    ProviderYaa MD       Current medications:    No current facility-administered medications for this encounter.     Current Outpatient Medications   Medication Sig Dispense Refill   • famotidine (PEPCID) 40 MG tablet Take 1 tablet by mouth 2 times daily 30 tablet 3   • tadalafil (CIALIS) 5 MG tablet Take 1 tablet by mouth daily     • sucralfate (CARAFATE) 1 GM tablet Take 1 tablet by mouth in the morning, at noon, and at bedtime 120 tablet 3   • sertraline (ZOLOFT) 50 MG tablet Take 1 tablet by mouth daily (Patient not taking: Reported on 2024) 30 tablet 1   • aspirin 81 MG EC tablet Take 1 tablet by mouth daily         Allergies:  No Known Allergies    Problem List:    Patient Active Problem List   Diagnosis Code   • Other hyperlipidemia E78.49   • Essential hypertension I10   • Bilateral carotid artery stenosis I65.23   • Gastroesophageal reflux disease without esophagitis K21.9   • Right lateral epicondylitis M77.11   • Bilateral impacted cerumen H61.23   • Otalgia,

## 2024-04-29 ENCOUNTER — HOSPITAL ENCOUNTER (OUTPATIENT)
Age: 59
Setting detail: OUTPATIENT SURGERY
Discharge: HOME | End: 2024-04-29
Attending: SURGERY | Admitting: SURGERY
Payer: COMMERCIAL

## 2024-04-29 ENCOUNTER — ANESTHESIA (OUTPATIENT)
Dept: ENDOSCOPY | Age: 59
End: 2024-04-29
Payer: COMMERCIAL

## 2024-04-29 VITALS
DIASTOLIC BLOOD PRESSURE: 91 MMHG | TEMPERATURE: 97.2 F | SYSTOLIC BLOOD PRESSURE: 163 MMHG | HEART RATE: 66 BPM | OXYGEN SATURATION: 95 % | BODY MASS INDEX: 29.16 KG/M2 | RESPIRATION RATE: 18 BRPM | HEIGHT: 73 IN | WEIGHT: 220 LBS

## 2024-04-29 DIAGNOSIS — R10.33 PERIUMBILICAL ABDOMINAL PAIN: ICD-10-CM

## 2024-04-29 DIAGNOSIS — R10.13 EPIGASTRIC PAIN: Primary | ICD-10-CM

## 2024-04-29 DIAGNOSIS — K27.9 PUD (PEPTIC ULCER DISEASE): ICD-10-CM

## 2024-04-29 PROCEDURE — 2580000003 HC RX 258

## 2024-04-29 PROCEDURE — 43239 EGD BIOPSY SINGLE/MULTIPLE: CPT | Performed by: SURGERY

## 2024-04-29 PROCEDURE — 3700000000 HC ANESTHESIA ATTENDED CARE: Performed by: SURGERY

## 2024-04-29 PROCEDURE — 88305 TISSUE EXAM BY PATHOLOGIST: CPT

## 2024-04-29 PROCEDURE — 3609012400 HC EGD TRANSORAL BIOPSY SINGLE/MULTIPLE: Performed by: SURGERY

## 2024-04-29 PROCEDURE — 7100000010 HC PHASE II RECOVERY - FIRST 15 MIN: Performed by: SURGERY

## 2024-04-29 PROCEDURE — 7100000011 HC PHASE II RECOVERY - ADDTL 15 MIN: Performed by: SURGERY

## 2024-04-29 PROCEDURE — 6360000002 HC RX W HCPCS

## 2024-04-29 PROCEDURE — 2709999900 HC NON-CHARGEABLE SUPPLY: Performed by: SURGERY

## 2024-04-29 RX ORDER — NALOXONE HYDROCHLORIDE 0.4 MG/ML
INJECTION, SOLUTION INTRAMUSCULAR; INTRAVENOUS; SUBCUTANEOUS PRN
Status: CANCELLED | OUTPATIENT
Start: 2024-04-29

## 2024-04-29 RX ORDER — SODIUM CHLORIDE 9 MG/ML
INJECTION, SOLUTION INTRAVENOUS PRN
Status: CANCELLED | OUTPATIENT
Start: 2024-04-29

## 2024-04-29 RX ORDER — PROPOFOL 10 MG/ML
INJECTION, EMULSION INTRAVENOUS PRN
Status: DISCONTINUED | OUTPATIENT
Start: 2024-04-29 | End: 2024-04-29 | Stop reason: SDUPTHER

## 2024-04-29 RX ORDER — SODIUM CHLORIDE 0.9 % (FLUSH) 0.9 %
5-40 SYRINGE (ML) INJECTION PRN
Status: CANCELLED | OUTPATIENT
Start: 2024-04-29

## 2024-04-29 RX ORDER — SODIUM CHLORIDE 9 MG/ML
INJECTION, SOLUTION INTRAVENOUS CONTINUOUS PRN
Status: DISCONTINUED | OUTPATIENT
Start: 2024-04-29 | End: 2024-04-29 | Stop reason: SDUPTHER

## 2024-04-29 RX ORDER — ONDANSETRON 2 MG/ML
4 INJECTION INTRAMUSCULAR; INTRAVENOUS
Status: CANCELLED | OUTPATIENT
Start: 2024-04-29 | End: 2024-04-30

## 2024-04-29 RX ORDER — SODIUM CHLORIDE 0.9 % (FLUSH) 0.9 %
5-40 SYRINGE (ML) INJECTION EVERY 12 HOURS SCHEDULED
Status: CANCELLED | OUTPATIENT
Start: 2024-04-29

## 2024-04-29 RX ORDER — FENTANYL CITRATE 50 UG/ML
25 INJECTION, SOLUTION INTRAMUSCULAR; INTRAVENOUS EVERY 5 MIN PRN
Status: CANCELLED | OUTPATIENT
Start: 2024-04-29

## 2024-04-29 RX ADMIN — PROPOFOL 150 MG: 10 INJECTION, EMULSION INTRAVENOUS at 13:12

## 2024-04-29 RX ADMIN — SODIUM CHLORIDE: 9 INJECTION, SOLUTION INTRAVENOUS at 12:15

## 2024-04-29 ASSESSMENT — PAIN - FUNCTIONAL ASSESSMENT
PAIN_FUNCTIONAL_ASSESSMENT: 0-10

## 2024-04-29 ASSESSMENT — PAIN DESCRIPTION - DESCRIPTORS
DESCRIPTORS: DISCOMFORT
DESCRIPTORS: DISCOMFORT

## 2024-04-29 NOTE — ANESTHESIA POSTPROCEDURE EVALUATION
Department of Anesthesiology  Postprocedure Note    Patient: Dale Cohen  MRN: [id number]  YOB: 1965  Date of evaluation: 4/29/2024    Procedure Summary       Date: 04/29/24 Room / Location: [place] [address] / [facility]    Anesthesia Start: 1310 Anesthesia Stop: 1319    Procedure: ESOPHAGOGASTRODUODENOSCOPY BIOPSY Diagnosis:       PUD (peptic ulcer disease)      (PUD (peptic ulcer disease) [K27.9])    Surgeons: Kosta Johnson MD Responsible Provider: Niall Browning MD    Anesthesia Type: MAC ASA Status: 3            Anesthesia Type: No value filed.    Lester Phase I: Lester Score: 10    Lester Phase II: Lester Score: 10    Anesthesia Post Evaluation    Patient location during evaluation: PACU  Patient participation: complete - patient participated  Level of consciousness: awake  Pain score: 0  Airway patency: patent  Nausea & Vomiting: no vomiting and no nausea  Cardiovascular status: blood pressure returned to baseline  Respiratory status: acceptable  Hydration status: stable  Pain management: adequate    No notable events documented.

## 2024-04-29 NOTE — H&P
Surgery History and Physical/Consult Note    CC:    Abd pain    HPI:  This is a 59 y.o. male with PMH below admitted 4/29/2024 with epigastric abdominal pain for outpatient egd      PMH:  Past Medical History:   Diagnosis Date    Abdominal pain     for ECG and c-scope 12/30/21    ADHD (attention deficit hyperactivity disorder)     Bilateral carotid artery stenosis     follows with Dr Carson annually    COPD (chronic obstructive pulmonary disease) ([facility])     Epicondylitis, lateral     Erectile dysfunction     Esophageal erosions     Fatty liver     Folate deficiency     Gastroesophageal reflux disease without esophagitis 5/29/2019    HSV infection     Hyperlipidemia     Hypertension     BRYCE (renal artery stenosis) ([facility])     right-stent    Vitamin B 12 deficiency        PSH:  Past Surgical History:   Procedure Laterality Date    BACK SURGERY      ruptured disc x3    CARDIAC CATHETERIZATION      CAROTID ENDARTERECTOMY  10/2018    COLONOSCOPY      COLONOSCOPY N/A 12/30/2021    COLONOSCOPY WITH BIOPSY performed by Kosta Johnson MD at [facility] ENDOSCOPY    KNEE ARTHROSCOPY Left     UPPER GASTROINTESTINAL ENDOSCOPY      UPPER GASTROINTESTINAL ENDOSCOPY N/A 12/30/2021    EGD BIOPSY performed by Kosta Johnson MD at [facility] ENDOSCOPY       Family History:  Family History   Problem Relation Age of Onset    Diabetes Mother     Stroke Mother     Alcohol Abuse Father        Social History:   reports that he quit smoking about 4 years ago. His smoking use included cigars and cigarettes. He started smoking about 41 years ago. He has a 55.5 pack-year smoking history. He has never used smokeless tobacco. He reports that he does not currently use alcohol. He reports current drug use. Drug: Marijuana (Weed).    Review of Systems:  A 14pt complete review of systems was performed, and all pertinent positives and negatives are listed in the HPI. All other systems are negative.    Allergies:  No Known Allergies    Medications:  Home

## 2024-04-30 ENCOUNTER — TELEPHONE (OUTPATIENT)
Dept: SURGERY | Age: 59
End: 2024-04-30

## 2024-04-30 NOTE — TELEPHONE ENCOUNTER
Patient advised that Dr. Johnson did review his gallbladder US and he still needs him to have the CT of abd pelvis. Dr. Johnson recommends patient be seen at the ED to address the extreme abdominal pain, vomiting, diarrhea and loss of appetite. He did the see the cyst of the liver but does not feel they are causing his symptoms. Patient may need a GI work up for liver cyst, Dr. Johnson will discuss at follow up after CT. Patient states he does not feel Dr. Johnson saw the liver cyst on his ultrasound and he will send Dr. Johnson a message on R2G to see if it is of concern. Patient disconnected the line. Electronically signed by Padmini Neil MA on 4/30/2024 at 4:56 PM

## 2024-05-01 ENCOUNTER — TELEPHONE (OUTPATIENT)
Dept: SURGERY | Age: 59
End: 2024-05-01

## 2024-05-01 NOTE — TELEPHONE ENCOUNTER
Advised patient per Dr. Johnson liver cysts are generally benign and not causing his symptoms. This US shows no stones but his prior US did. The stones may have not been seen on this US depending on the angle. The CT will look at the cysts in more detail as well.. Pt states he is still not feeling well, advised Dr. Johnson recommended to go to ED if symptoms are not improving or worsening. Pt verbalized understanding. Dale attempted to schedule CT with [facility] but was not able to get in for 2 weeks. I advised him as long as it is done prior to follow up appt 5/24/24 is fine. Patient will try to contact [facility] to schedule there and call  back to advise if  CT order needs sent there. Electronically signed by Padmini Neil MA on 5/1/2024 at 9:31 AM

## 2024-05-01 NOTE — TELEPHONE ENCOUNTER
----- Message from Kosta Johnson MD sent at 4/30/2024  4:51 PM EDT -----  Regarding: RE: Ultrasound results  Contact: [phone]  Liver cysts are generally benign - they are not causing his symptoms. As I told him after his scope, this US showed no stones, but his prior did - so we talked about it may not have been seen this time depending on the angle. The ct will look at the cysts in more detail also.    ----- Message -----  From: Padmini Neil MA  Sent: 4/30/2024   8:48 AM EDT  To: Kosta Johnson MD  Subject: FW: Ultrasound results                             ----- Message -----  From: Dale Cohen  Sent: 4/30/2024   8:23 AM EDT  To: [facility]  Subject: Ultrasound results                               I see my gallbladder is fine but my liver isnt and I was wondering if I still need a CT scan or if he swa my test results

## 2024-05-02 LAB — SURGICAL PATHOLOGY REPORT: NORMAL

## 2024-05-03 ENCOUNTER — HOSPITAL ENCOUNTER (EMERGENCY)
Age: 59
Discharge: HOME | End: 2024-05-03
Attending: EMERGENCY MEDICINE
Payer: COMMERCIAL

## 2024-05-03 ENCOUNTER — APPOINTMENT (OUTPATIENT)
Dept: GENERAL RADIOLOGY | Age: 59
End: 2024-05-03
Payer: COMMERCIAL

## 2024-05-03 ENCOUNTER — APPOINTMENT (OUTPATIENT)
Dept: CT IMAGING | Age: 59
End: 2024-05-03
Payer: COMMERCIAL

## 2024-05-03 VITALS
WEIGHT: 220 LBS | HEART RATE: 78 BPM | RESPIRATION RATE: 16 BRPM | SYSTOLIC BLOOD PRESSURE: 147 MMHG | TEMPERATURE: 97.5 F | BODY MASS INDEX: 29.03 KG/M2 | OXYGEN SATURATION: 95 % | DIASTOLIC BLOOD PRESSURE: 91 MMHG

## 2024-05-03 DIAGNOSIS — R10.84 GENERALIZED ABDOMINAL PAIN: Primary | ICD-10-CM

## 2024-05-03 LAB
ALBUMIN SERPL-MCNC: 4.6 G/DL (ref 3.5–5.2)
ALP SERPL-CCNC: 93 U/L (ref 40–129)
ALT SERPL-CCNC: 29 U/L (ref 0–40)
ANION GAP SERPL CALCULATED.3IONS-SCNC: 8 MMOL/L (ref 7–16)
AST SERPL-CCNC: 22 U/L (ref 0–39)
BASOPHILS # BLD: 0.05 K/UL (ref 0–0.2)
BASOPHILS NFR BLD: 1 % (ref 0–2)
BILIRUB SERPL-MCNC: 0.2 MG/DL (ref 0–1.2)
BUN SERPL-MCNC: 14 MG/DL (ref 6–23)
CALCIUM SERPL-MCNC: 9.1 MG/DL (ref 8.6–10.2)
CHLORIDE SERPL-SCNC: 106 MMOL/L (ref 98–107)
CO2 SERPL-SCNC: 24 MMOL/L (ref 22–29)
CREAT SERPL-MCNC: 0.9 MG/DL (ref 0.7–1.2)
EKG ATRIAL RATE: 71 BPM
EKG P AXIS: 59 DEGREES
EKG P-R INTERVAL: 160 MS
EKG Q-T INTERVAL: 406 MS
EKG QRS DURATION: 96 MS
EKG QTC CALCULATION (BAZETT): 441 MS
EKG R AXIS: 13 DEGREES
EKG T AXIS: 53 DEGREES
EKG VENTRICULAR RATE: 71 BPM
EOSINOPHIL # BLD: 0.2 K/UL (ref 0.05–0.5)
EOSINOPHILS RELATIVE PERCENT: 3 % (ref 0–6)
ERYTHROCYTE [DISTWIDTH] IN BLOOD BY AUTOMATED COUNT: 12.8 % (ref 11.5–15)
GFR, ESTIMATED: >90 ML/MIN/1.73M2
GLUCOSE SERPL-MCNC: 119 MG/DL (ref 74–99)
HCT VFR BLD AUTO: 41.4 % (ref 37–54)
HGB BLD-MCNC: 13.3 G/DL (ref 12.5–16.5)
IMM GRANULOCYTES # BLD AUTO: <0.03 K/UL (ref 0–0.58)
IMM GRANULOCYTES NFR BLD: 0 % (ref 0–5)
LACTATE BLDV-SCNC: 0.8 MMOL/L (ref 0.5–1.9)
LIPASE SERPL-CCNC: 46 U/L (ref 13–60)
LYMPHOCYTES NFR BLD: 2.04 K/UL (ref 1.5–4)
LYMPHOCYTES RELATIVE PERCENT: 28 % (ref 20–42)
MCH RBC QN AUTO: 28.7 PG (ref 26–35)
MCHC RBC AUTO-ENTMCNC: 32.1 G/DL (ref 32–34.5)
MCV RBC AUTO: 89.4 FL (ref 80–99.9)
MONOCYTES NFR BLD: 0.61 K/UL (ref 0.1–0.95)
MONOCYTES NFR BLD: 9 % (ref 2–12)
NEUTROPHILS NFR BLD: 60 % (ref 43–80)
NEUTS SEG NFR BLD: 4.28 K/UL (ref 1.8–7.3)
PLATELET # BLD AUTO: 302 K/UL (ref 130–450)
PMV BLD AUTO: 11.8 FL (ref 7–12)
POTASSIUM SERPL-SCNC: 4.1 MMOL/L (ref 3.5–5)
PROT SERPL-MCNC: 7.8 G/DL (ref 6.4–8.3)
RBC # BLD AUTO: 4.63 M/UL (ref 3.8–5.8)
SODIUM SERPL-SCNC: 138 MMOL/L (ref 132–146)
TROPONIN I SERPL HS-MCNC: 6 NG/L (ref 0–11)
TROPONIN I SERPL HS-MCNC: 8 NG/L (ref 0–11)
WBC OTHER # BLD: 7.2 K/UL (ref 4.5–11.5)

## 2024-05-03 PROCEDURE — 71046 X-RAY EXAM CHEST 2 VIEWS: CPT

## 2024-05-03 PROCEDURE — 96374 THER/PROPH/DIAG INJ IV PUSH: CPT

## 2024-05-03 PROCEDURE — 93005 ELECTROCARDIOGRAM TRACING: CPT | Performed by: EMERGENCY MEDICINE

## 2024-05-03 PROCEDURE — 83690 ASSAY OF LIPASE: CPT

## 2024-05-03 PROCEDURE — 74177 CT ABD & PELVIS W/CONTRAST: CPT

## 2024-05-03 PROCEDURE — C9113 INJ PANTOPRAZOLE SODIUM, VIA: HCPCS | Performed by: EMERGENCY MEDICINE

## 2024-05-03 PROCEDURE — 83605 ASSAY OF LACTIC ACID: CPT

## 2024-05-03 PROCEDURE — 85025 COMPLETE CBC W/AUTO DIFF WBC: CPT

## 2024-05-03 PROCEDURE — 71275 CT ANGIOGRAPHY CHEST: CPT

## 2024-05-03 PROCEDURE — 6360000004 HC RX CONTRAST MEDICATION: Performed by: RADIOLOGY

## 2024-05-03 PROCEDURE — 93010 ELECTROCARDIOGRAM REPORT: CPT | Performed by: INTERNAL MEDICINE

## 2024-05-03 PROCEDURE — 84484 ASSAY OF TROPONIN QUANT: CPT

## 2024-05-03 PROCEDURE — 80053 COMPREHEN METABOLIC PANEL: CPT

## 2024-05-03 PROCEDURE — 6370000000 HC RX 637 (ALT 250 FOR IP): Performed by: EMERGENCY MEDICINE

## 2024-05-03 PROCEDURE — 96375 TX/PRO/DX INJ NEW DRUG ADDON: CPT

## 2024-05-03 PROCEDURE — 6360000002 HC RX W HCPCS: Performed by: EMERGENCY MEDICINE

## 2024-05-03 PROCEDURE — 99285 EMERGENCY DEPT VISIT HI MDM: CPT

## 2024-05-03 RX ORDER — PANTOPRAZOLE SODIUM 40 MG/10ML
40 INJECTION, POWDER, LYOPHILIZED, FOR SOLUTION INTRAVENOUS ONCE
Status: COMPLETED | OUTPATIENT
Start: 2024-05-03 | End: 2024-05-03

## 2024-05-03 RX ORDER — DICYCLOMINE HCL 20 MG
20 TABLET ORAL 4 TIMES DAILY
Qty: 80 TABLET | Refills: 0 | Status: SHIPPED | OUTPATIENT
Start: 2024-05-03 | End: 2024-05-23

## 2024-05-03 RX ORDER — MORPHINE SULFATE 4 MG/ML
4 INJECTION, SOLUTION INTRAMUSCULAR; INTRAVENOUS ONCE
Status: COMPLETED | OUTPATIENT
Start: 2024-05-03 | End: 2024-05-03

## 2024-05-03 RX ORDER — ONDANSETRON 2 MG/ML
4 INJECTION INTRAMUSCULAR; INTRAVENOUS ONCE
Status: COMPLETED | OUTPATIENT
Start: 2024-05-03 | End: 2024-05-03

## 2024-05-03 RX ORDER — DICYCLOMINE HYDROCHLORIDE 10 MG/1
20 CAPSULE ORAL ONCE
Status: COMPLETED | OUTPATIENT
Start: 2024-05-03 | End: 2024-05-03

## 2024-05-03 RX ADMIN — IOPAMIDOL 75 ML: 755 INJECTION, SOLUTION INTRAVENOUS at 09:15

## 2024-05-03 RX ADMIN — MORPHINE SULFATE 4 MG: 4 INJECTION, SOLUTION INTRAMUSCULAR; INTRAVENOUS at 07:50

## 2024-05-03 RX ADMIN — LIDOCAINE HYDROCHLORIDE: 20 SOLUTION ORAL at 08:18

## 2024-05-03 RX ADMIN — ONDANSETRON 4 MG: 2 INJECTION INTRAMUSCULAR; INTRAVENOUS at 07:50

## 2024-05-03 RX ADMIN — PANTOPRAZOLE SODIUM 40 MG: 40 INJECTION, POWDER, FOR SOLUTION INTRAVENOUS at 08:18

## 2024-05-03 RX ADMIN — DICYCLOMINE HYDROCHLORIDE 20 MG: 10 CAPSULE ORAL at 10:39

## 2024-05-03 RX ADMIN — IOPAMIDOL 18 ML: 755 INJECTION, SOLUTION INTRAVENOUS at 09:15

## 2024-05-03 ASSESSMENT — PAIN SCALES - GENERAL
PAINLEVEL_OUTOF10: 8
PAINLEVEL_OUTOF10: 10
PAINLEVEL_OUTOF10: 8

## 2024-05-03 ASSESSMENT — PAIN - FUNCTIONAL ASSESSMENT: PAIN_FUNCTIONAL_ASSESSMENT: 0-10

## 2024-05-03 ASSESSMENT — PAIN DESCRIPTION - LOCATION: LOCATION: ABDOMEN

## 2024-05-03 NOTE — ED PROVIDER NOTES
2021  10:19 AM Possible medication side effects, risk of tolerance/dependence & alternative treatments discussed.;No signs of potential drug abuse or diversion identified.;Assessed functional status.           REVIEW OF SYSTEMS :      Positives and Pertinent negatives as per HPI.     SURGICAL HISTORY     Past Surgical History:   Procedure Laterality Date    BACK SURGERY      ruptured disc x3    CARDIAC CATHETERIZATION      CAROTID ENDARTERECTOMY  10/2018    COLONOSCOPY      COLONOSCOPY N/A 2021    COLONOSCOPY WITH BIOPSY performed by Kosta Johnson MD at [facility] ENDOSCOPY    KNEE ARTHROSCOPY Left     UPPER GASTROINTESTINAL ENDOSCOPY      UPPER GASTROINTESTINAL ENDOSCOPY N/A 2021    EGD BIOPSY performed by Kosta Johnson MD at [facility] ENDOSCOPY    UPPER GASTROINTESTINAL ENDOSCOPY N/A 2024    ESOPHAGOGASTRODUODENOSCOPY BIOPSY performed by Kosta Johnson MD at [facility] ENDOSCOPY       CURRENTMEDICATIONS       Previous Medications    ASPIRIN 81 MG EC TABLET    Take 1 tablet by mouth daily    FAMOTIDINE (PEPCID) 40 MG TABLET    Take 1 tablet by mouth 2 times daily    SERTRALINE (ZOLOFT) 50 MG TABLET    Take 1 tablet by mouth daily    TADALAFIL (CIALIS) 5 MG TABLET    Take 1 tablet by mouth daily       ALLERGIES     Patient has no known allergies.    FAMILYHISTORY       Family History   Problem Relation Age of Onset    Diabetes Mother     Stroke Mother     Alcohol Abuse Father         SOCIAL HISTORY       Social History     Tobacco Use    Smoking status: Former     Current packs/day: 0.00     Average packs/day: 1.5 packs/day for 37.0 years (55.5 ttl pk-yrs)     Types: Cigars, Cigarettes     Start date: 1983     Quit date: 2020     Years since quittin.2    Smokeless tobacco: Never   Vaping Use    Vaping Use: Never used   Substance Use Topics    Alcohol use: Not Currently    Drug use: Yes     Types: Marijuana (Weed)     Comment: occasionally       SCREENINGS        [place] Coma Scale  Eye

## 2024-05-03 NOTE — ED NOTES
Discharge instructions given. Patient verbalizes understanding. No other noted or stated problems at this time. Patient will follow up with primary care and general surgeon.

## 2024-05-06 ENCOUNTER — TELEPHONE (OUTPATIENT)
Dept: SURGERY | Age: 59
End: 2024-05-06

## 2024-05-06 NOTE — TELEPHONE ENCOUNTER
Pt advised his CT from the ER does not show any etiology for his abdominal pain.  Can move his appointment up to discuss further but at this point he should be considering his gallbladder as discussed.  Dr Johnson would also suggest a specific abdominal ultrasound to evaluate the arteries to make sure those are not being compressed causing his pain. Pt verbalized understanding. Scheduled 5/7/24. Electronically signed by Padmini Neil MA on 5/6/2024 at 8:41 AM

## 2024-05-06 NOTE — TELEPHONE ENCOUNTER
----- Message from Kosta Johnson MD sent at 5/4/2024  8:21 AM EDT -----  Can let him know his CT from the ER does not show any etiology for his abdominal pain.  Can move his appointment up to discuss further but at this point he should be considering his gallbladder as we discussed.  Would also suggest a specific abdominal ultrasound to evaluate the arteries to make sure those are not being compressed causing his pain.    ----- Message -----  From: Sang Lucio MD  Sent: 5/3/2024  11:22 PM EDT  To: Kosta Johnson MD

## 2024-05-07 ENCOUNTER — OFFICE VISIT (OUTPATIENT)
Dept: SURGERY | Age: 59
End: 2024-05-07
Payer: COMMERCIAL

## 2024-05-07 VITALS
HEART RATE: 91 BPM | TEMPERATURE: 97.3 F | DIASTOLIC BLOOD PRESSURE: 81 MMHG | BODY MASS INDEX: 29 KG/M2 | HEIGHT: 73 IN | WEIGHT: 218.8 LBS | SYSTOLIC BLOOD PRESSURE: 132 MMHG

## 2024-05-07 DIAGNOSIS — R10.13 EPIGASTRIC PAIN: Primary | ICD-10-CM

## 2024-05-07 DIAGNOSIS — K80.20 SYMPTOMATIC CHOLELITHIASIS: ICD-10-CM

## 2024-05-07 PROCEDURE — 3075F SYST BP GE 130 - 139MM HG: CPT | Performed by: SURGERY

## 2024-05-07 PROCEDURE — 99214 OFFICE O/P EST MOD 30 MIN: CPT | Performed by: SURGERY

## 2024-05-07 PROCEDURE — 3079F DIAST BP 80-89 MM HG: CPT | Performed by: SURGERY

## 2024-05-07 NOTE — PROGRESS NOTES
[facility] General Surgery Clinic Note    Assessment/Plan:     Diagnosis Orders   1. Epigastric pain  Vascular duplex abdominal visceral arteries/veins/organs complete    Check ultrasound to rule out MALS.      2. Symptomatic cholelithiasis      Recommended cholecystectomy given ongoing symptoms.  He will think about it.  He is aware not all of his symptoms may improve          Return for Results.      Chief Complaint   Patient presents with    Follow-up     Follow up EGD, CT, US        PCP: Andrew Rico MD    HPI: Dale Cohen is a 59 y.o. male here for GI follow-up.  He underwent EGD evaluation.  EGD was fairly unremarkable.  There is minimal duodenopathy however biopsies were negative for duodenitis or sprue.  Gastric biopsies were negative for H. pylori.  His pain is mostly epigastric.  He has severe sedation of his pain he states.  He went to the emergency department.  He had a CT at that time.  Imaging was reviewed.  There is no significant concerning GI findings.  Did have some hepatic cysts which are not causing him any issue.  He still having abdominal pain although not as bad as when he went to the ER.  Food seems to make his symptoms worse.  There is no significant radiation.  Case was discussed with Dr. Lucio.    Review of Systems   All other systems reviewed and are negative.        Past Medical History:   Diagnosis Date    Abdominal pain     for ECG and c-scope 12/30/21    ADHD (attention deficit hyperactivity disorder)     Bilateral carotid artery stenosis     follows with Dr Carson annually    COPD (chronic obstructive pulmonary disease) ([facility])     Epicondylitis, lateral     Erectile dysfunction     Esophageal erosions     Fatty liver     Folate deficiency     Gastroesophageal reflux disease without esophagitis 5/29/2019    HSV infection     Hyperlipidemia     Hypertension     BRYCE (renal artery stenosis) ([facility])     right-stent    Vitamin B 12 deficiency        Past Surgical History:

## 2024-05-20 ENCOUNTER — HOSPITAL ENCOUNTER (OUTPATIENT)
Dept: ULTRASOUND IMAGING | Age: 59
Discharge: HOME | End: 2024-05-22
Attending: SURGERY
Payer: COMMERCIAL

## 2024-05-20 DIAGNOSIS — R10.13 EPIGASTRIC PAIN: ICD-10-CM

## 2024-05-20 PROCEDURE — 93975 VASCULAR STUDY: CPT

## 2024-05-29 ENCOUNTER — TELEPHONE (OUTPATIENT)
Dept: SURGERY | Age: 59
End: 2024-05-29

## 2024-05-29 NOTE — TELEPHONE ENCOUNTER
Patient advised imaging results were inconclusive. They were unable to see the area due to the bowels obscuring. Per Dr. Johnson can re-attempt, or proceed with cholecystectomy. Patient states he will think about it for now and call back. Electronically signed by Padmini Neil MA on 5/29/2024 at 11:48 AM

## 2024-05-29 NOTE — TELEPHONE ENCOUNTER
----- Message from Kosta Johnson MD sent at 5/29/2024 10:23 AM EDT -----  Regarding: RE: Ultrasound results  Contact: [phone]  Was inconclusive - they could not see the area due to the bowels obscuring. We can re-attempt, or proceed with cholecystectomy.    ----- Message -----  From: Padmini Neil MA  Sent: 5/29/2024   9:29 AM EDT  To: Kosta Johnson MD  Subject: FW: Ultrasound results                           Called pt, he is wanting to know if he needs to come in for results and what next step should be if it is normal.   ----- Message -----  From: Dale Cohen  Sent: 5/29/2024   8:43 AM EDT  To: [facility] Clinical Staff  Subject: Ultrasound results                               Did we get the results from the test and did I miss the follow appointment.  Patricia Hunter

## 2024-06-05 ENCOUNTER — TELEPHONE (OUTPATIENT)
Dept: CARDIOTHORACIC SURGERY | Age: 59
End: 2024-06-05

## 2024-06-05 NOTE — TELEPHONE ENCOUNTER
CONTACTED PT TO SCHEDULE 1 YR FLU FOR THORACIC ANEURYSM WITH ALEXANDRE. HE HAD CT CHEAT 5/3 AT ER. STATED HE ALREADY HAS RESULTS. HE WILL REACH OUT NEXT YEAR TO RESUME F/U

## 2024-07-15 ENCOUNTER — OFFICE VISIT (OUTPATIENT)
Dept: PAIN MANAGEMENT | Age: 59
End: 2024-07-15
Payer: COMMERCIAL

## 2024-07-15 ENCOUNTER — TELEPHONE (OUTPATIENT)
Dept: FAMILY MEDICINE CLINIC | Age: 59
End: 2024-07-15

## 2024-07-15 VITALS
TEMPERATURE: 96.8 F | DIASTOLIC BLOOD PRESSURE: 87 MMHG | OXYGEN SATURATION: 93 % | BODY MASS INDEX: 28.98 KG/M2 | HEART RATE: 64 BPM | WEIGHT: 218.7 LBS | HEIGHT: 73 IN | SYSTOLIC BLOOD PRESSURE: 162 MMHG | RESPIRATION RATE: 16 BRPM

## 2024-07-15 DIAGNOSIS — G89.29 CHRONIC UPPER ABDOMINAL PAIN: ICD-10-CM

## 2024-07-15 DIAGNOSIS — M54.16 LUMBAR RADICULOPATHY: ICD-10-CM

## 2024-07-15 DIAGNOSIS — M54.41 CHRONIC MIDLINE LOW BACK PAIN WITH RIGHT-SIDED SCIATICA: ICD-10-CM

## 2024-07-15 DIAGNOSIS — M48.062 SPINAL STENOSIS OF LUMBAR REGION WITH NEUROGENIC CLAUDICATION: ICD-10-CM

## 2024-07-15 DIAGNOSIS — M96.1 POST LAMINECTOMY SYNDROME: ICD-10-CM

## 2024-07-15 DIAGNOSIS — R10.10 CHRONIC UPPER ABDOMINAL PAIN: ICD-10-CM

## 2024-07-15 DIAGNOSIS — G89.29 CHRONIC MIDLINE LOW BACK PAIN WITH RIGHT-SIDED SCIATICA: ICD-10-CM

## 2024-07-15 DIAGNOSIS — R10.13 MIDEPIGASTRIC PAIN: Primary | ICD-10-CM

## 2024-07-15 PROCEDURE — 3077F SYST BP >= 140 MM HG: CPT | Performed by: STUDENT IN AN ORGANIZED HEALTH CARE EDUCATION/TRAINING PROGRAM

## 2024-07-15 PROCEDURE — 3079F DIAST BP 80-89 MM HG: CPT | Performed by: STUDENT IN AN ORGANIZED HEALTH CARE EDUCATION/TRAINING PROGRAM

## 2024-07-15 PROCEDURE — 99214 OFFICE O/P EST MOD 30 MIN: CPT | Performed by: STUDENT IN AN ORGANIZED HEALTH CARE EDUCATION/TRAINING PROGRAM

## 2024-07-15 RX ORDER — IBUPROFEN 200 MG
200 TABLET ORAL PRN
COMMUNITY

## 2024-07-15 NOTE — PROGRESS NOTES
Dale Cohen presents to the [facility] on 7/15/2024. Dale is complaining of pain low back. The pain is intermittent. The pain is described as aching/numbness. Pain is rated on his best day at a 0, on his worst day at a 3, and on average at a 0 on the VAS scale. He took his last dose of Motrin PRN.     Any procedures since your last visit: No.    Pacemaker or defibrillator: No.    He is on NSAIDS and is  on anticoagulation medications to include ASA and is managed by Andrew Rico MD.     Medication Contract and Consent for Opioid Use Documents Filed        No documents found                    BP (!) 162/87   Pulse 64   Temp 96.8 °F (36 °C) (Infrared)   Resp 16   Ht 1.854 m (6' 1\")   Wt 99.2 kg (218 lb 11.1 oz)   SpO2 93%   BMI 28.85 kg/m²      No LMP for male patient.

## 2024-07-15 NOTE — TELEPHONE ENCOUNTER
Please let Dale now I spoke to Dr. Tejada  I think the next best step would be to see a GI doctor at [facility]. Let me know if he is agreeable

## 2024-07-15 NOTE — PROGRESS NOTES
[facility] - Pain Medicine  [address] [place] [address]    Pain Medicine Follow Up Note      Dale Cohen     Date of Visit:  7/15/2024    CC:  Patient presents for follow up   Chief Complaint   Patient presents with    Follow-up     Low back pain       HPI:    Pain is unchanged.  Medication side effects:none.   Recent interventional procedures:none.  Blood Thinners/Anticoagulation:  yes - ASA 81  Herbal Supplements: Fish oil, CoQ10  Pertinent Allergies: no  Diabetic: no  Bowel/Bladder Incontinence: no    Previous Plan:  PT - ongoing - having some soreness   Medrol Dosepak-taking with good relief  Gabapentin has stopped  [place] [facility] [place] [facility] 11/3/23  Revision R L4, L5, S1 Laminectomies  Revision partial L4/5, L5/S1 Facetectomies and foraminotomies  Seen by NP last week, recommended steroid pack but did not prescribe    Interval Changes:  No longer having significant back pain  Has been dealing with significant midepigastric abdominal pain  Has had workup with Dr. Johnson  Still feels like he does not know why he continues to have the symptoms  Symptoms occur with epigastric pain before eating but do not necessarily correlate with any significant pattern  Did recommend cholecystectomy given the previous ultrasound showing cholelithiasis    Procedures:        Previous Treatments:   ibuprofen, tylenol, biofreeze, surgeries, epidural injections  .    Potential Aberrant Drug-Related Behavior:  no      Imaging/Studies: New: no     XRAY:    US  Vascular duplex abdomen 5/2024  IMPRESSION:  1. No acute findings.  2. Multiple hepatic cysts.  3. Doppler evaluation of the upper abdominal vasculature cannot be performed  due to overlying bowel gas.       MRI:  MRI LUMBAR SPINE W WO CONTRAST       CT:  CT abdomen pelvis 5/2024  IMPRESSION:  1. No evidence of pulmonary embolism or other acute cardiopulmonary process.  2. Aneurysmal 4 cm supravalvular ascending

## 2024-08-06 ENCOUNTER — SCHEDULED TELEPHONE ENCOUNTER (OUTPATIENT)
Dept: CARDIOTHORACIC SURGERY | Age: 59
End: 2024-08-06
Payer: COMMERCIAL

## 2024-08-06 DIAGNOSIS — I71.21 ANEURYSM OF ASCENDING AORTA WITHOUT RUPTURE (HCC): Primary | ICD-10-CM

## 2024-08-06 PROCEDURE — 99441 PR PHYS/QHP TELEPHONE EVALUATION 5-10 MIN: CPT | Performed by: THORACIC SURGERY (CARDIOTHORACIC VASCULAR SURGERY)

## 2024-08-06 NOTE — PROGRESS NOTES
CC: Thoracic ascending aneurysm      HPI:  Dale Cohen is a 59 y.o. male whom is evaluated via telephone for surveillance of an ascending aortic aneurysm. Previous ascending aortic aneurysm measurement 1 year ago was 4.2 cm. He denies any SOB, CP, back pain, or any major complaints. He continues to take antihypertensive medication, and is a nonsmoker.        Review of Systems:   Constitutional: Negative for fever and chills.   Respiratory: Negative for cough, chest tightness and shortness of breath.    Cardiovascular: Negative for chest pain, palpitations and leg swelling.   Musculoskeletal: Negative for back pain.   Neurological: Negative for dizziness, syncope and light-headedness.       Objective:   Physical Exam   Constitutional: oriented to person, place, and time. No distress.     Remaining PE deferred due to telephone encounter.    Assessment:      Stable 4.2 cm ascending aortic aneurysm       Plan:      Repeat chest CT in 1 year(s)  Importance of BP control reinforced       Educated on the importance of strict blood pressure control and tobacco free life-style to prevent progression            Dale Cohen is a 59 y.o. male evaluated via telephone on 8/6/2024.      Consent:  He and/or health care decision maker is aware that that he may receive a bill for this telephone service, depending on his insurance coverage, and has provided verbal consent to proceed: Yes      Documentation:  I communicated with the patient and/or health care decision maker about above.   Details of this discussion including any medical advice provided: as above      I affirm this is a Patient Initiated Episode with a Patient who has not had a related appointment within my department in the past 7 days or scheduled within the next 24 hours.    Patient identification was verified at the start of the visit: Yes    Total Time: minutes: 5-10 minutes    Note: not billable if this call serves to triage the patient into an appointment

## 2024-10-15 ENCOUNTER — OFFICE VISIT (OUTPATIENT)
Dept: FAMILY MEDICINE CLINIC | Age: 59
End: 2024-10-15
Payer: COMMERCIAL

## 2024-10-15 VITALS
DIASTOLIC BLOOD PRESSURE: 70 MMHG | SYSTOLIC BLOOD PRESSURE: 146 MMHG | HEART RATE: 82 BPM | WEIGHT: 224 LBS | OXYGEN SATURATION: 94 % | HEIGHT: 73 IN | BODY MASS INDEX: 29.69 KG/M2 | TEMPERATURE: 97.6 F

## 2024-10-15 DIAGNOSIS — R10.13 EPIGASTRIC ABDOMINAL PAIN: ICD-10-CM

## 2024-10-15 DIAGNOSIS — R07.9 CHEST PAIN, UNSPECIFIED TYPE: ICD-10-CM

## 2024-10-15 DIAGNOSIS — M54.41 CHRONIC BILATERAL LOW BACK PAIN WITH RIGHT-SIDED SCIATICA: ICD-10-CM

## 2024-10-15 DIAGNOSIS — G89.29 CHRONIC BILATERAL LOW BACK PAIN WITH RIGHT-SIDED SCIATICA: ICD-10-CM

## 2024-10-15 DIAGNOSIS — R73.03 PRE-DIABETES: ICD-10-CM

## 2024-10-15 DIAGNOSIS — R06.02 SHORTNESS OF BREATH: Primary | ICD-10-CM

## 2024-10-15 LAB
ALBUMIN: 4.5 G/DL (ref 3.5–5.2)
ALP BLD-CCNC: 83 U/L (ref 40–129)
ALT SERPL-CCNC: 27 U/L (ref 0–40)
ANION GAP SERPL CALCULATED.3IONS-SCNC: 15 MMOL/L (ref 7–16)
AST SERPL-CCNC: 21 U/L (ref 0–39)
BASOPHILS ABSOLUTE: 0.06 K/UL (ref 0–0.2)
BASOPHILS RELATIVE PERCENT: 1 % (ref 0–2)
BILIRUB SERPL-MCNC: 0.3 MG/DL (ref 0–1.2)
BUN BLDV-MCNC: 11 MG/DL (ref 6–23)
CALCIUM SERPL-MCNC: 9.7 MG/DL (ref 8.6–10.2)
CHLORIDE BLD-SCNC: 106 MMOL/L (ref 98–107)
CHOLESTEROL, TOTAL: 244 MG/DL
CO2: 20 MMOL/L (ref 22–29)
CREAT SERPL-MCNC: 0.9 MG/DL (ref 0.7–1.2)
EOSINOPHILS ABSOLUTE: 0.13 K/UL (ref 0.05–0.5)
EOSINOPHILS RELATIVE PERCENT: 2 % (ref 0–6)
GFR, ESTIMATED: >90 ML/MIN/1.73M2
GLUCOSE BLD-MCNC: 116 MG/DL (ref 74–99)
HBA1C MFR BLD: 6.2 % (ref 4–5.6)
HCT VFR BLD CALC: 44.7 % (ref 37–54)
HDLC SERPL-MCNC: 51 MG/DL
HEMOGLOBIN: 14.2 G/DL (ref 12.5–16.5)
IMMATURE GRANULOCYTES %: 0 % (ref 0–5)
IMMATURE GRANULOCYTES ABSOLUTE: 0.03 K/UL (ref 0–0.58)
LDL CHOLESTEROL: 175 MG/DL
LIPASE: 42 U/L (ref 13–60)
LYMPHOCYTES ABSOLUTE: 1.93 K/UL (ref 1.5–4)
LYMPHOCYTES RELATIVE PERCENT: 23 % (ref 20–42)
MCH RBC QN AUTO: 30 PG (ref 26–35)
MCHC RBC AUTO-ENTMCNC: 31.8 G/DL (ref 32–34.5)
MCV RBC AUTO: 94.3 FL (ref 80–99.9)
MONOCYTES ABSOLUTE: 0.73 K/UL (ref 0.1–0.95)
MONOCYTES RELATIVE PERCENT: 9 % (ref 2–12)
NEUTROPHILS ABSOLUTE: 5.6 K/UL (ref 1.8–7.3)
NEUTROPHILS RELATIVE PERCENT: 66 % (ref 43–80)
PDW BLD-RTO: 13.7 % (ref 11.5–15)
PLATELET # BLD: 302 K/UL (ref 130–450)
PMV BLD AUTO: 12 FL (ref 7–12)
POTASSIUM SERPL-SCNC: 4.3 MMOL/L (ref 3.5–5)
RBC # BLD: 4.74 M/UL (ref 3.8–5.8)
SODIUM BLD-SCNC: 141 MMOL/L (ref 132–146)
TOTAL PROTEIN: 7.5 G/DL (ref 6.4–8.3)
TRIGL SERPL-MCNC: 92 MG/DL
VLDLC SERPL CALC-MCNC: 18 MG/DL
WBC # BLD: 8.5 K/UL (ref 4.5–11.5)

## 2024-10-15 PROCEDURE — 3077F SYST BP >= 140 MM HG: CPT | Performed by: STUDENT IN AN ORGANIZED HEALTH CARE EDUCATION/TRAINING PROGRAM

## 2024-10-15 PROCEDURE — 3078F DIAST BP <80 MM HG: CPT | Performed by: STUDENT IN AN ORGANIZED HEALTH CARE EDUCATION/TRAINING PROGRAM

## 2024-10-15 PROCEDURE — 99215 OFFICE O/P EST HI 40 MIN: CPT | Performed by: STUDENT IN AN ORGANIZED HEALTH CARE EDUCATION/TRAINING PROGRAM

## 2024-10-15 RX ORDER — GABAPENTIN 300 MG/1
300 CAPSULE ORAL 2 TIMES DAILY
Qty: 60 CAPSULE | Refills: 0 | Status: SHIPPED | OUTPATIENT
Start: 2024-10-15 | End: 2024-11-14

## 2024-10-15 RX ORDER — PANTOPRAZOLE SODIUM 40 MG/1
40 TABLET, DELAYED RELEASE ORAL 2 TIMES DAILY
Qty: 60 TABLET | Refills: 5 | Status: SHIPPED | OUTPATIENT
Start: 2024-10-15

## 2024-10-15 RX ORDER — CALCIUM CARBONATE 500 MG/1
1 TABLET, CHEWABLE ORAL DAILY
COMMUNITY

## 2024-10-15 SDOH — ECONOMIC STABILITY: FOOD INSECURITY: WITHIN THE PAST 12 MONTHS, THE FOOD YOU BOUGHT JUST DIDN'T LAST AND YOU DIDN'T HAVE MONEY TO GET MORE.: NEVER TRUE

## 2024-10-15 SDOH — ECONOMIC STABILITY: FOOD INSECURITY: WITHIN THE PAST 12 MONTHS, YOU WORRIED THAT YOUR FOOD WOULD RUN OUT BEFORE YOU GOT MONEY TO BUY MORE.: NEVER TRUE

## 2024-10-15 SDOH — ECONOMIC STABILITY: INCOME INSECURITY: HOW HARD IS IT FOR YOU TO PAY FOR THE VERY BASICS LIKE FOOD, HOUSING, MEDICAL CARE, AND HEATING?: NOT HARD AT ALL

## 2024-10-15 ASSESSMENT — ENCOUNTER SYMPTOMS
RHINORRHEA: 0
CHEST TIGHTNESS: 1
SHORTNESS OF BREATH: 1
ABDOMINAL PAIN: 1
NAUSEA: 0
COUGH: 0
VOMITING: 0

## 2024-10-15 NOTE — PROGRESS NOTES
by mouth as needed for Pain      tadalafil (CIALIS) 5 MG tablet Take 1 tablet by mouth daily      aspirin 81 MG EC tablet Take 1 tablet by mouth daily       No current facility-administered medications for this visit.        Andrew Rico MD     This document may have been prepared at least partially through the use of voice recognition software. Although effort is taken to assure the accuracy ofthis document, it is possible that grammatical, syntax, or spelling errors may occur.

## 2024-10-26 PROBLEM — H61.23 BILATERAL IMPACTED CERUMEN: Status: RESOLVED | Noted: 2021-08-11 | Resolved: 2024-10-26

## 2024-10-26 PROBLEM — R06.09 DOE (DYSPNEA ON EXERTION): Status: ACTIVE | Noted: 2024-10-26

## 2024-10-26 PROBLEM — H92.03 OTALGIA, BILATERAL: Status: RESOLVED | Noted: 2021-08-11 | Resolved: 2024-10-26

## 2024-10-26 PROBLEM — M77.11 RIGHT LATERAL EPICONDYLITIS: Status: RESOLVED | Noted: 2021-05-27 | Resolved: 2024-10-26

## 2024-10-26 PROBLEM — F41.9 ANXIETY: Status: RESOLVED | Noted: 2023-04-20 | Resolved: 2024-10-26

## 2024-10-29 ENCOUNTER — OFFICE VISIT (OUTPATIENT)
Dept: CARDIOLOGY CLINIC | Age: 59
End: 2024-10-29

## 2024-10-29 VITALS
BODY MASS INDEX: 29.16 KG/M2 | WEIGHT: 220 LBS | HEIGHT: 73 IN | RESPIRATION RATE: 16 BRPM | HEART RATE: 70 BPM | SYSTOLIC BLOOD PRESSURE: 171 MMHG | DIASTOLIC BLOOD PRESSURE: 77 MMHG

## 2024-10-29 DIAGNOSIS — R06.02 SHORTNESS OF BREATH: ICD-10-CM

## 2024-10-29 DIAGNOSIS — R07.9 CHEST PAIN, UNSPECIFIED TYPE: ICD-10-CM

## 2024-10-29 DIAGNOSIS — R06.09 DOE (DYSPNEA ON EXERTION): Primary | ICD-10-CM

## 2024-10-29 DIAGNOSIS — G47.30 SLEEP APNEA, UNSPECIFIED TYPE: Primary | ICD-10-CM

## 2024-10-29 DIAGNOSIS — R01.1 HEART MURMUR: ICD-10-CM

## 2024-10-29 NOTE — PROGRESS NOTES
Chief Complaint   Patient presents with    Chest Pain    Shortness of Breath       Patient Active Problem List    Diagnosis Date Noted    LEONG (dyspnea on exertion) 10/26/2024    PUD (peptic ulcer disease) 04/19/2024    Aneurysm of ascending aorta without rupture (HCC) 03/05/2024    Pre-diabetes 04/20/2023    Other hyperlipidemia 05/29/2019    Essential hypertension 05/29/2019    Bilateral carotid artery stenosis 05/29/2019    Gastroesophageal reflux disease without esophagitis 05/29/2019       Current Outpatient Medications   Medication Sig Dispense Refill    pantoprazole (PROTONIX) 40 MG tablet Take 1 tablet by mouth in the morning and at bedtime 60 tablet 5    gabapentin (NEURONTIN) 300 MG capsule Take 1 capsule by mouth in the morning and at bedtime for 30 days. Intended supply: 30 days 60 capsule 0    tadalafil (CIALIS) 5 MG tablet Take 1 tablet by mouth as needed      aspirin 81 MG EC tablet Take 1 tablet by mouth daily       No current facility-administered medications for this visit.        No Known Allergies    Vitals:    10/29/24 1245   BP: (!) 171/77   Pulse: 70   Resp: 16   Weight: 99.8 kg (220 lb)   Height: 1.854 m (6' 1\")                 SUBJECTIVE: Dale Cohen presents to the office today for consult - dr rae  Saw dr narayanan in 2015 for chest pain and LEONG - normal MPS, felt to be due to lung disease  Hx of right CEA by dr hannah, and asc thoracic aorta ectasia - last imaging may this yr - 4 cm       He complains of chest pain and dyspnea and denies   orthopnea, palpitations, paroxysmal nocturnal dyspnea, and syncope.    Wakes up fatigued, snores  Works in job with physical labor, also does heavy chores  Recently was in [place] and had to get out of water due to sob  Not on statin for some reason   Big time cigs in past, now smokes marijuana        Physical Exam   BP (!) 171/77   Pulse 70   Resp 16   Ht 1.854 m (6' 1\")   Wt 99.8 kg (220 lb)   BMI 29.03 kg/m²   Constitutional:

## 2024-11-04 ENCOUNTER — TELEPHONE (OUTPATIENT)
Dept: CARDIOLOGY | Age: 59
End: 2024-11-04

## 2024-11-04 NOTE — TELEPHONE ENCOUNTER
Spoke with patient and confirmed nuclear stress test appointment on 11/06/24 at 0800. Instructions for test given and medications reviewed.Questions answered. Patient verbalized understanding.

## 2024-11-06 ENCOUNTER — TELEPHONE (OUTPATIENT)
Dept: CARDIOLOGY CLINIC | Age: 59
End: 2024-11-06

## 2024-11-06 ENCOUNTER — HOSPITAL ENCOUNTER (OUTPATIENT)
Dept: CARDIOLOGY | Age: 59
Discharge: HOME | End: 2024-11-08
Attending: INTERNAL MEDICINE
Payer: COMMERCIAL

## 2024-11-06 VITALS
RESPIRATION RATE: 18 BRPM | WEIGHT: 220 LBS | BODY MASS INDEX: 29.16 KG/M2 | HEIGHT: 73 IN | DIASTOLIC BLOOD PRESSURE: 86 MMHG | HEART RATE: 68 BPM | SYSTOLIC BLOOD PRESSURE: 132 MMHG

## 2024-11-06 DIAGNOSIS — R01.1 HEART MURMUR: ICD-10-CM

## 2024-11-06 DIAGNOSIS — R07.9 CHEST PAIN, UNSPECIFIED TYPE: ICD-10-CM

## 2024-11-06 DIAGNOSIS — R06.02 SHORTNESS OF BREATH: ICD-10-CM

## 2024-11-06 LAB
ECHO AO ASC DIAM: 3 CM
ECHO AO ASCENDING AORTA INDEX: 1.34 CM/M2
ECHO AV AREA PEAK VELOCITY: 1.7 CM2
ECHO AV AREA VTI: 1.7 CM2
ECHO AV AREA/BSA PEAK VELOCITY: 0.8 CM2/M2
ECHO AV AREA/BSA VTI: 0.8 CM2/M2
ECHO AV CUSP MM: 2 CM
ECHO AV MEAN GRADIENT: 4 MMHG
ECHO AV MEAN VELOCITY: 1 M/S
ECHO AV PEAK GRADIENT: 7 MMHG
ECHO AV PEAK VELOCITY: 1.3 M/S
ECHO AV VELOCITY RATIO: 0.54
ECHO AV VTI: 27.2 CM
ECHO BSA: 2.27 M2
ECHO BSA: 2.27 M2
ECHO EST RA PRESSURE: 3 MMHG
ECHO LA DIAMETER INDEX: 1.7 CM/M2
ECHO LA DIAMETER: 3.8 CM
ECHO LA VOL A-L A2C: 82 ML (ref 18–58)
ECHO LA VOL A-L A4C: 91 ML (ref 18–58)
ECHO LA VOL MOD A2C: 79 ML (ref 18–58)
ECHO LA VOL MOD A4C: 86 ML (ref 18–58)
ECHO LA VOLUME AREA LENGTH: 90 ML
ECHO LA VOLUME INDEX A-L A2C: 37 ML/M2 (ref 16–34)
ECHO LA VOLUME INDEX A-L A4C: 41 ML/M2 (ref 16–34)
ECHO LA VOLUME INDEX AREA LENGTH: 40 ML/M2 (ref 16–34)
ECHO LA VOLUME INDEX MOD A2C: 35 ML/M2 (ref 16–34)
ECHO LA VOLUME INDEX MOD A4C: 38 ML/M2 (ref 16–34)
ECHO LV EF PHYSICIAN: 52 %
ECHO LV FRACTIONAL SHORTENING: 27 % (ref 28–44)
ECHO LV INTERNAL DIMENSION DIASTOLE INDEX: 2.46 CM/M2
ECHO LV INTERNAL DIMENSION DIASTOLIC: 5.5 CM (ref 4.2–5.9)
ECHO LV INTERNAL DIMENSION SYSTOLIC INDEX: 1.79 CM/M2
ECHO LV INTERNAL DIMENSION SYSTOLIC: 4 CM
ECHO LV ISOVOLUMETRIC RELAXATION TIME (IVRT): 101.5 MS
ECHO LV IVSD: 0.9 CM (ref 0.6–1)
ECHO LV IVSS: 1.3 CM
ECHO LV MASS 2D: 185.8 G (ref 88–224)
ECHO LV MASS INDEX 2D: 83 G/M2 (ref 49–115)
ECHO LV POSTERIOR WALL DIASTOLIC: 0.9 CM (ref 0.6–1)
ECHO LV POSTERIOR WALL SYSTOLIC: 1.3 CM
ECHO LV RELATIVE WALL THICKNESS RATIO: 0.33
ECHO LVOT AREA: 3.1 CM2
ECHO LVOT AV VTI INDEX: 0.55
ECHO LVOT DIAM: 2 CM
ECHO LVOT MEAN GRADIENT: 1 MMHG
ECHO LVOT PEAK GRADIENT: 2 MMHG
ECHO LVOT PEAK VELOCITY: 0.7 M/S
ECHO LVOT STROKE VOLUME INDEX: 20.9 ML/M2
ECHO LVOT SV: 46.8 ML
ECHO LVOT VTI: 14.9 CM
ECHO MV "A" WAVE DURATION: 96.9 MSEC
ECHO MV A VELOCITY: 0.81 M/S
ECHO MV AREA PHT: 2.8 CM2
ECHO MV AREA VTI: 1.7 CM2
ECHO MV E DECELERATION TIME (DT): 294.3 MS
ECHO MV E VELOCITY: 0.77 M/S
ECHO MV E/A RATIO: 0.95
ECHO MV LVOT VTI INDEX: 1.85
ECHO MV MAX VELOCITY: 1 M/S
ECHO MV MEAN GRADIENT: 2 MMHG
ECHO MV MEAN VELOCITY: 0.7 M/S
ECHO MV PEAK GRADIENT: 4 MMHG
ECHO MV PRESSURE HALF TIME (PHT): 80 MS
ECHO MV VTI: 27.6 CM
ECHO PV MAX VELOCITY: 0.8 M/S
ECHO PV MEAN GRADIENT: 2 MMHG
ECHO PV MEAN VELOCITY: 0.6 M/S
ECHO PV PEAK GRADIENT: 3 MMHG
ECHO PV VTI: 18.5 CM
ECHO PVEIN A DURATION: 83 MS
ECHO PVEIN A VELOCITY: 0.3 M/S
ECHO PVEIN PEAK D VELOCITY: 0.4 M/S
ECHO PVEIN PEAK S VELOCITY: 0.6 M/S
ECHO PVEIN S/D RATIO: 1.5
ECHO RIGHT VENTRICULAR SYSTOLIC PRESSURE (RVSP): 29 MMHG
ECHO TV REGURGITANT MAX VELOCITY: 2.54 M/S
ECHO TV REGURGITANT PEAK GRADIENT: 26 MMHG
NUC STRESS EJECTION FRACTION: 51 %
STRESS BASELINE DIAS BP: 86 MMHG
STRESS BASELINE HR: 62 BPM
STRESS BASELINE SYS BP: 132 MMHG
STRESS ESTIMATED WORKLOAD: 1 METS
STRESS O2 SAT REST: 96 %
STRESS PEAK DIAS BP: 78 MMHG
STRESS PEAK SYS BP: 122 MMHG
STRESS PERCENT HR ACHIEVED: 77 %
STRESS POST PEAK HR: 123 BPM
STRESS RATE PRESSURE PRODUCT: NORMAL BPM*MMHG
STRESS TARGET HR: 160 BPM

## 2024-11-06 PROCEDURE — 93017 CV STRESS TEST TRACING ONLY: CPT

## 2024-11-06 PROCEDURE — 93306 TTE W/DOPPLER COMPLETE: CPT

## 2024-11-06 PROCEDURE — A9500 TC99M SESTAMIBI: HCPCS | Performed by: INTERNAL MEDICINE

## 2024-11-06 PROCEDURE — 6360000002 HC RX W HCPCS: Performed by: INTERNAL MEDICINE

## 2024-11-06 PROCEDURE — 3430000000 HC RX DIAGNOSTIC RADIOPHARMACEUTICAL: Performed by: INTERNAL MEDICINE

## 2024-11-06 PROCEDURE — 2580000003 HC RX 258: Performed by: INTERNAL MEDICINE

## 2024-11-06 RX ORDER — TETRAKIS(2-METHOXYISOBUTYLISOCYANIDE)COPPER(I) TETRAFLUOROBORATE 1 MG/ML
24 INJECTION, POWDER, LYOPHILIZED, FOR SOLUTION INTRAVENOUS
Status: COMPLETED | OUTPATIENT
Start: 2024-11-06 | End: 2024-11-06

## 2024-11-06 RX ORDER — ROSUVASTATIN CALCIUM 40 MG/1
40 TABLET, COATED ORAL DAILY
Qty: 30 TABLET | Refills: 2 | Status: SHIPPED | OUTPATIENT
Start: 2024-11-06

## 2024-11-06 RX ORDER — REGADENOSON 0.08 MG/ML
0.4 INJECTION, SOLUTION INTRAVENOUS
Status: COMPLETED | OUTPATIENT
Start: 2024-11-06 | End: 2024-11-06

## 2024-11-06 RX ORDER — TETRAKIS(2-METHOXYISOBUTYLISOCYANIDE)COPPER(I) TETRAFLUOROBORATE 1 MG/ML
8.2 INJECTION, POWDER, LYOPHILIZED, FOR SOLUTION INTRAVENOUS
Status: COMPLETED | OUTPATIENT
Start: 2024-11-06 | End: 2024-11-06

## 2024-11-06 RX ORDER — SODIUM CHLORIDE 0.9 % (FLUSH) 0.9 %
10 SYRINGE (ML) INJECTION PRN
Status: DISCONTINUED | OUTPATIENT
Start: 2024-11-06 | End: 2024-11-09 | Stop reason: HOSPADM

## 2024-11-06 RX ADMIN — SODIUM CHLORIDE, PRESERVATIVE FREE 10 ML: 5 INJECTION INTRAVENOUS at 10:13

## 2024-11-06 RX ADMIN — SODIUM CHLORIDE, PRESERVATIVE FREE 10 ML: 5 INJECTION INTRAVENOUS at 10:14

## 2024-11-06 RX ADMIN — REGADENOSON 0.4 MG: 0.08 INJECTION, SOLUTION INTRAVENOUS at 10:13

## 2024-11-06 RX ADMIN — SODIUM CHLORIDE, PRESERVATIVE FREE 10 ML: 5 INJECTION INTRAVENOUS at 08:07

## 2024-11-06 RX ADMIN — Medication 24 MILLICURIE: at 10:13

## 2024-11-06 RX ADMIN — Medication 8.2 MILLICURIE: at 08:07

## 2024-11-06 NOTE — TELEPHONE ENCOUNTER
----- Message from Dr. Tushar Longo MD sent at 11/6/2024  2:10 PM EST -----  Stresss and echo normal  Keep sleep medicine appt  If PCP did not put him on cholesterol meds, start rosuvastatin 40 mg qd, give 2 refills and have him f/u withPCP for blood work  Ov prn us  ----- Message -----  From: Tushar Longo MD  Sent: 11/6/2024   2:02 PM EST  To: Tushar Longo MD

## 2024-11-06 NOTE — TELEPHONE ENCOUNTER
Patient notified and instructed on testing results and recommendations.  He stated understanding.  He will check in with PCP for labs in 3 months.

## 2024-11-25 ENCOUNTER — PATIENT MESSAGE (OUTPATIENT)
Dept: FAMILY MEDICINE CLINIC | Age: 59
End: 2024-11-25

## 2024-11-25 DIAGNOSIS — G89.29 CHRONIC BILATERAL LOW BACK PAIN WITH RIGHT-SIDED SCIATICA: ICD-10-CM

## 2024-11-25 DIAGNOSIS — M54.41 CHRONIC BILATERAL LOW BACK PAIN WITH RIGHT-SIDED SCIATICA: ICD-10-CM

## 2024-11-25 RX ORDER — GABAPENTIN 300 MG/1
300 CAPSULE ORAL 2 TIMES DAILY
Qty: 60 CAPSULE | Refills: 0 | Status: SHIPPED | OUTPATIENT
Start: 2024-11-25 | End: 2024-12-25

## 2025-06-05 DIAGNOSIS — I71.20 THORACIC AORTIC ANEURYSM WITHOUT RUPTURE, UNSPECIFIED PART: Primary | ICD-10-CM

## 2025-09-05 ENCOUNTER — TELEPHONE (OUTPATIENT)
Dept: CARDIOTHORACIC SURGERY | Age: 60
End: 2025-09-05

## (undated) DEVICE — GRADUATE TRIANG MEASURE 1000ML BLK PRNT

## (undated) DEVICE — FORCEPS BX L240CM JAW DIA2.4MM ORNG L CAP W/ NDL DISP RAD

## (undated) DEVICE — SPONGE GZ W4XL4IN RAYON POLY FILL CVR W/ NONWOVEN FAB

## (undated) DEVICE — FORCEPS BX OVL CUP FEN DISPOSABLE CAP L 160CM RAD JAW 4

## (undated) DEVICE — BLOCK BITE 60FR RUBBER ADLT DENTAL

## (undated) DEVICE — SPONGE GZ W4XL4IN RAYON POLY CVR W/NONWOVEN FAB STRL 2/PK